# Patient Record
Sex: FEMALE | URBAN - METROPOLITAN AREA
[De-identification: names, ages, dates, MRNs, and addresses within clinical notes are randomized per-mention and may not be internally consistent; named-entity substitution may affect disease eponyms.]

---

## 2018-07-19 ENCOUNTER — INPATIENT (INPATIENT)
Facility: HOSPITAL | Age: 48
LOS: 6 days | Discharge: ANOTHER HOSPITAL | DRG: 690 | End: 2018-07-26
Attending: INTERNAL MEDICINE | Admitting: INTERNAL MEDICINE
Payer: MEDICARE

## 2018-07-19 VITALS
SYSTOLIC BLOOD PRESSURE: 141 MMHG | TEMPERATURE: 98 F | OXYGEN SATURATION: 97 % | RESPIRATION RATE: 14 BRPM | WEIGHT: 220.9 LBS | DIASTOLIC BLOOD PRESSURE: 59 MMHG | HEART RATE: 79 BPM

## 2018-07-19 DIAGNOSIS — Q76.1 KLIPPEL-FEIL SYNDROME: Chronic | ICD-10-CM

## 2018-07-19 LAB
ALBUMIN SERPL ELPH-MCNC: 3.2 G/DL — LOW (ref 3.3–5)
ALP SERPL-CCNC: 108 U/L — SIGNIFICANT CHANGE UP (ref 40–120)
ALT FLD-CCNC: 25 U/L — SIGNIFICANT CHANGE UP (ref 12–78)
ANION GAP SERPL CALC-SCNC: 11 MMOL/L — SIGNIFICANT CHANGE UP (ref 5–17)
ANISOCYTOSIS BLD QL: SLIGHT — SIGNIFICANT CHANGE UP
APPEARANCE UR: ABNORMAL
APTT BLD: 29.9 SEC — SIGNIFICANT CHANGE UP (ref 27.5–37.4)
AST SERPL-CCNC: 19 U/L — SIGNIFICANT CHANGE UP (ref 15–37)
BACTERIA # UR AUTO: ABNORMAL
BASOPHILS # BLD AUTO: 0.06 K/UL — SIGNIFICANT CHANGE UP (ref 0–0.2)
BASOPHILS NFR BLD AUTO: 0.5 % — SIGNIFICANT CHANGE UP (ref 0–2)
BILIRUB SERPL-MCNC: 0.4 MG/DL — SIGNIFICANT CHANGE UP (ref 0.2–1.2)
BILIRUB UR-MCNC: NEGATIVE — SIGNIFICANT CHANGE UP
BUN SERPL-MCNC: 16 MG/DL — SIGNIFICANT CHANGE UP (ref 7–23)
CALCIUM SERPL-MCNC: 8.3 MG/DL — LOW (ref 8.5–10.1)
CHLORIDE SERPL-SCNC: 97 MMOL/L — SIGNIFICANT CHANGE UP (ref 96–108)
CO2 SERPL-SCNC: 23 MMOL/L — SIGNIFICANT CHANGE UP (ref 22–31)
COLOR SPEC: YELLOW — SIGNIFICANT CHANGE UP
CREAT SERPL-MCNC: 1.3 MG/DL — SIGNIFICANT CHANGE UP (ref 0.5–1.3)
DACRYOCYTES BLD QL SMEAR: SLIGHT — SIGNIFICANT CHANGE UP
DIFF PNL FLD: NEGATIVE — SIGNIFICANT CHANGE UP
ELLIPTOCYTES BLD QL SMEAR: SLIGHT — SIGNIFICANT CHANGE UP
EOSINOPHIL # BLD AUTO: 0.55 K/UL — HIGH (ref 0–0.5)
EOSINOPHIL NFR BLD AUTO: 4.8 % — SIGNIFICANT CHANGE UP (ref 0–6)
EPI CELLS # UR: SIGNIFICANT CHANGE UP
GLUCOSE SERPL-MCNC: 404 MG/DL — HIGH (ref 70–99)
GLUCOSE UR QL: 1000 MG/DL
HCG SERPL-ACNC: 1 MIU/ML — SIGNIFICANT CHANGE UP
HCT VFR BLD CALC: 30.1 % — LOW (ref 34.5–45)
HGB BLD-MCNC: 9.7 G/DL — LOW (ref 11.5–15.5)
IMM GRANULOCYTES NFR BLD AUTO: 1 % — SIGNIFICANT CHANGE UP (ref 0–1.5)
INR BLD: 0.97 RATIO — SIGNIFICANT CHANGE UP (ref 0.88–1.16)
KETONES UR-MCNC: NEGATIVE — SIGNIFICANT CHANGE UP
LACTATE SERPL-SCNC: 1.7 MMOL/L — SIGNIFICANT CHANGE UP (ref 0.7–2)
LACTATE SERPL-SCNC: 2.5 MMOL/L — HIGH (ref 0.7–2)
LEUKOCYTE ESTERASE UR-ACNC: NEGATIVE — SIGNIFICANT CHANGE UP
LIDOCAIN IGE QN: 81 U/L — SIGNIFICANT CHANGE UP (ref 73–393)
LYMPHOCYTES # BLD AUTO: 1.88 K/UL — SIGNIFICANT CHANGE UP (ref 1–3.3)
LYMPHOCYTES # BLD AUTO: 16.4 % — SIGNIFICANT CHANGE UP (ref 13–44)
MANUAL SMEAR VERIFICATION: SIGNIFICANT CHANGE UP
MCHC RBC-ENTMCNC: 19.7 PG — LOW (ref 27–34)
MCHC RBC-ENTMCNC: 32.2 GM/DL — SIGNIFICANT CHANGE UP (ref 32–36)
MCV RBC AUTO: 61.2 FL — LOW (ref 80–100)
MICROCYTES BLD QL: SIGNIFICANT CHANGE UP
MONOCYTES # BLD AUTO: 0.64 K/UL — SIGNIFICANT CHANGE UP (ref 0–0.9)
MONOCYTES NFR BLD AUTO: 5.6 % — SIGNIFICANT CHANGE UP (ref 2–14)
NEUTROPHILS # BLD AUTO: 8.22 K/UL — HIGH (ref 1.8–7.4)
NEUTROPHILS NFR BLD AUTO: 71.7 % — SIGNIFICANT CHANGE UP (ref 43–77)
NITRITE UR-MCNC: POSITIVE
NRBC # BLD: 0 /100 WBCS — SIGNIFICANT CHANGE UP (ref 0–0)
OVALOCYTES BLD QL SMEAR: SLIGHT — SIGNIFICANT CHANGE UP
PH UR: 5 — SIGNIFICANT CHANGE UP (ref 5–8)
PLAT MORPH BLD: NORMAL — SIGNIFICANT CHANGE UP
PLATELET # BLD AUTO: 226 K/UL — SIGNIFICANT CHANGE UP (ref 150–400)
POIKILOCYTOSIS BLD QL AUTO: SLIGHT — SIGNIFICANT CHANGE UP
POTASSIUM SERPL-MCNC: 4.2 MMOL/L — SIGNIFICANT CHANGE UP (ref 3.5–5.3)
POTASSIUM SERPL-SCNC: 4.2 MMOL/L — SIGNIFICANT CHANGE UP (ref 3.5–5.3)
PROT SERPL-MCNC: 7.1 G/DL — SIGNIFICANT CHANGE UP (ref 6–8.3)
PROT UR-MCNC: 75 MG/DL
PROTHROM AB SERPL-ACNC: 10.6 SEC — SIGNIFICANT CHANGE UP (ref 9.8–12.7)
RBC # BLD: 4.92 M/UL — SIGNIFICANT CHANGE UP (ref 3.8–5.2)
RBC # FLD: 15.9 % — HIGH (ref 10.3–14.5)
RBC BLD AUTO: ABNORMAL
RBC CASTS # UR COMP ASSIST: SIGNIFICANT CHANGE UP /HPF (ref 0–4)
SODIUM SERPL-SCNC: 131 MMOL/L — LOW (ref 135–145)
SP GR SPEC: 1.01 — SIGNIFICANT CHANGE UP (ref 1.01–1.02)
UROBILINOGEN FLD QL: NEGATIVE — SIGNIFICANT CHANGE UP
WBC # BLD: 11.46 K/UL — HIGH (ref 3.8–10.5)
WBC # FLD AUTO: 11.46 K/UL — HIGH (ref 3.8–10.5)
WBC UR QL: SIGNIFICANT CHANGE UP

## 2018-07-19 PROCEDURE — 74176 CT ABD & PELVIS W/O CONTRAST: CPT | Mod: 26

## 2018-07-19 RX ORDER — SODIUM CHLORIDE 9 MG/ML
1000 INJECTION INTRAMUSCULAR; INTRAVENOUS; SUBCUTANEOUS ONCE
Refills: 0 | Status: COMPLETED | OUTPATIENT
Start: 2018-07-19 | End: 2018-07-19

## 2018-07-19 RX ORDER — ONDANSETRON 8 MG/1
4 TABLET, FILM COATED ORAL ONCE
Refills: 0 | Status: COMPLETED | OUTPATIENT
Start: 2018-07-19 | End: 2018-07-19

## 2018-07-19 RX ORDER — HYDROMORPHONE HYDROCHLORIDE 2 MG/ML
0.5 INJECTION INTRAMUSCULAR; INTRAVENOUS; SUBCUTANEOUS ONCE
Refills: 0 | Status: DISCONTINUED | OUTPATIENT
Start: 2018-07-19 | End: 2018-07-19

## 2018-07-19 RX ORDER — CEFTRIAXONE 500 MG/1
1 INJECTION, POWDER, FOR SOLUTION INTRAMUSCULAR; INTRAVENOUS ONCE
Refills: 0 | Status: COMPLETED | OUTPATIENT
Start: 2018-07-19 | End: 2018-07-19

## 2018-07-19 RX ADMIN — ONDANSETRON 4 MILLIGRAM(S): 8 TABLET, FILM COATED ORAL at 19:52

## 2018-07-19 RX ADMIN — SODIUM CHLORIDE 1000 MILLILITER(S): 9 INJECTION INTRAMUSCULAR; INTRAVENOUS; SUBCUTANEOUS at 19:56

## 2018-07-19 RX ADMIN — SODIUM CHLORIDE 1000 MILLILITER(S): 9 INJECTION INTRAMUSCULAR; INTRAVENOUS; SUBCUTANEOUS at 21:10

## 2018-07-19 RX ADMIN — HYDROMORPHONE HYDROCHLORIDE 0.5 MILLIGRAM(S): 2 INJECTION INTRAMUSCULAR; INTRAVENOUS; SUBCUTANEOUS at 23:44

## 2018-07-19 NOTE — ED ADULT NURSE NOTE - OBJECTIVE STATEMENT
Pt to ED states "I don't feel well". C/o abdominal pain diarrhea x 1 week. Abdomen diffusely tender with guarding.

## 2018-07-20 DIAGNOSIS — N12 TUBULO-INTERSTITIAL NEPHRITIS, NOT SPECIFIED AS ACUTE OR CHRONIC: ICD-10-CM

## 2018-07-20 LAB
CRP SERPL-MCNC: 2.17 MG/DL — HIGH (ref 0–0.4)
CULTURE RESULTS: SIGNIFICANT CHANGE UP
ERYTHROCYTE [SEDIMENTATION RATE] IN BLOOD: 22 MM/HR — HIGH (ref 0–15)
SPECIMEN SOURCE: SIGNIFICANT CHANGE UP

## 2018-07-20 PROCEDURE — 99285 EMERGENCY DEPT VISIT HI MDM: CPT

## 2018-07-20 RX ORDER — LISINOPRIL 2.5 MG/1
2.5 TABLET ORAL DAILY
Refills: 0 | Status: DISCONTINUED | OUTPATIENT
Start: 2018-07-20 | End: 2018-07-26

## 2018-07-20 RX ORDER — DEXTROSE 50 % IN WATER 50 %
25 SYRINGE (ML) INTRAVENOUS ONCE
Refills: 0 | Status: DISCONTINUED | OUTPATIENT
Start: 2018-07-20 | End: 2018-07-26

## 2018-07-20 RX ORDER — INSULIN GLARGINE 100 [IU]/ML
24 INJECTION, SOLUTION SUBCUTANEOUS AT BEDTIME
Refills: 0 | Status: DISCONTINUED | OUTPATIENT
Start: 2018-07-20 | End: 2018-07-22

## 2018-07-20 RX ORDER — SODIUM CHLORIDE 9 MG/ML
1000 INJECTION, SOLUTION INTRAVENOUS
Refills: 0 | Status: DISCONTINUED | OUTPATIENT
Start: 2018-07-20 | End: 2018-07-26

## 2018-07-20 RX ORDER — INSULIN LISPRO 100/ML
10 VIAL (ML) SUBCUTANEOUS
Refills: 0 | Status: DISCONTINUED | OUTPATIENT
Start: 2018-07-20 | End: 2018-07-26

## 2018-07-20 RX ORDER — MIRTAZAPINE 45 MG/1
30 TABLET, ORALLY DISINTEGRATING ORAL ONCE
Refills: 0 | Status: COMPLETED | OUTPATIENT
Start: 2018-07-20 | End: 2018-07-20

## 2018-07-20 RX ORDER — TRAZODONE HCL 50 MG
100 TABLET ORAL AT BEDTIME
Refills: 0 | Status: DISCONTINUED | OUTPATIENT
Start: 2018-07-20 | End: 2018-07-26

## 2018-07-20 RX ORDER — CLONAZEPAM 1 MG
1 TABLET ORAL ONCE
Refills: 0 | Status: DISCONTINUED | OUTPATIENT
Start: 2018-07-20 | End: 2018-07-20

## 2018-07-20 RX ORDER — DEXTROSE 50 % IN WATER 50 %
15 SYRINGE (ML) INTRAVENOUS ONCE
Refills: 0 | Status: DISCONTINUED | OUTPATIENT
Start: 2018-07-20 | End: 2018-07-26

## 2018-07-20 RX ORDER — NICOTINE POLACRILEX 2 MG
1 GUM BUCCAL DAILY
Refills: 0 | Status: DISCONTINUED | OUTPATIENT
Start: 2018-07-20 | End: 2018-07-26

## 2018-07-20 RX ORDER — MIRTAZAPINE 45 MG/1
30 TABLET, ORALLY DISINTEGRATING ORAL DAILY
Refills: 0 | Status: DISCONTINUED | OUTPATIENT
Start: 2018-07-20 | End: 2018-07-26

## 2018-07-20 RX ORDER — ENOXAPARIN SODIUM 100 MG/ML
40 INJECTION SUBCUTANEOUS DAILY
Refills: 0 | Status: DISCONTINUED | OUTPATIENT
Start: 2018-07-20 | End: 2018-07-26

## 2018-07-20 RX ORDER — KETOROLAC TROMETHAMINE 30 MG/ML
15 SYRINGE (ML) INJECTION ONCE
Refills: 0 | Status: DISCONTINUED | OUTPATIENT
Start: 2018-07-20 | End: 2018-07-20

## 2018-07-20 RX ORDER — GABAPENTIN 400 MG/1
800 CAPSULE ORAL ONCE
Refills: 0 | Status: COMPLETED | OUTPATIENT
Start: 2018-07-20 | End: 2018-07-20

## 2018-07-20 RX ORDER — FUROSEMIDE 40 MG
20 TABLET ORAL
Refills: 0 | Status: DISCONTINUED | OUTPATIENT
Start: 2018-07-20 | End: 2018-07-26

## 2018-07-20 RX ORDER — CITALOPRAM 10 MG/1
20 TABLET, FILM COATED ORAL DAILY
Refills: 0 | Status: DISCONTINUED | OUTPATIENT
Start: 2018-07-20 | End: 2018-07-26

## 2018-07-20 RX ORDER — ASPIRIN/CALCIUM CARB/MAGNESIUM 324 MG
81 TABLET ORAL DAILY
Refills: 0 | Status: DISCONTINUED | OUTPATIENT
Start: 2018-07-20 | End: 2018-07-26

## 2018-07-20 RX ORDER — TRAZODONE HCL 50 MG
200 TABLET ORAL ONCE
Refills: 0 | Status: COMPLETED | OUTPATIENT
Start: 2018-07-20 | End: 2018-07-20

## 2018-07-20 RX ORDER — ATORVASTATIN CALCIUM 80 MG/1
10 TABLET, FILM COATED ORAL ONCE
Refills: 0 | Status: COMPLETED | OUTPATIENT
Start: 2018-07-20 | End: 2018-07-20

## 2018-07-20 RX ORDER — OXYCODONE HYDROCHLORIDE 5 MG/1
10 TABLET ORAL
Refills: 0 | Status: DISCONTINUED | OUTPATIENT
Start: 2018-07-20 | End: 2018-07-26

## 2018-07-20 RX ORDER — DEXTROSE 50 % IN WATER 50 %
12.5 SYRINGE (ML) INTRAVENOUS ONCE
Refills: 0 | Status: DISCONTINUED | OUTPATIENT
Start: 2018-07-20 | End: 2018-07-26

## 2018-07-20 RX ORDER — CITALOPRAM 10 MG/1
40 TABLET, FILM COATED ORAL ONCE
Refills: 0 | Status: COMPLETED | OUTPATIENT
Start: 2018-07-20 | End: 2018-07-20

## 2018-07-20 RX ORDER — GLUCAGON INJECTION, SOLUTION 0.5 MG/.1ML
1 INJECTION, SOLUTION SUBCUTANEOUS ONCE
Refills: 0 | Status: DISCONTINUED | OUTPATIENT
Start: 2018-07-20 | End: 2018-07-26

## 2018-07-20 RX ORDER — LEVOTHYROXINE SODIUM 125 MCG
50 TABLET ORAL DAILY
Refills: 0 | Status: DISCONTINUED | OUTPATIENT
Start: 2018-07-20 | End: 2018-07-26

## 2018-07-20 RX ORDER — INSULIN LISPRO 100/ML
VIAL (ML) SUBCUTANEOUS
Refills: 0 | Status: DISCONTINUED | OUTPATIENT
Start: 2018-07-20 | End: 2018-07-26

## 2018-07-20 RX ORDER — FAMOTIDINE 10 MG/ML
20 INJECTION INTRAVENOUS
Refills: 0 | Status: DISCONTINUED | OUTPATIENT
Start: 2018-07-20 | End: 2018-07-26

## 2018-07-20 RX ORDER — HYDROMORPHONE HYDROCHLORIDE 2 MG/ML
0.5 INJECTION INTRAMUSCULAR; INTRAVENOUS; SUBCUTANEOUS ONCE
Refills: 0 | Status: DISCONTINUED | OUTPATIENT
Start: 2018-07-20 | End: 2018-07-20

## 2018-07-20 RX ORDER — LACTOBACILLUS ACIDOPHILUS 100MM CELL
1 CAPSULE ORAL
Refills: 0 | Status: DISCONTINUED | OUTPATIENT
Start: 2018-07-20 | End: 2018-07-26

## 2018-07-20 RX ORDER — CEFTRIAXONE 500 MG/1
1 INJECTION, POWDER, FOR SOLUTION INTRAMUSCULAR; INTRAVENOUS EVERY 24 HOURS
Refills: 0 | Status: DISCONTINUED | OUTPATIENT
Start: 2018-07-20 | End: 2018-07-25

## 2018-07-20 RX ORDER — ATORVASTATIN CALCIUM 80 MG/1
20 TABLET, FILM COATED ORAL AT BEDTIME
Refills: 0 | Status: DISCONTINUED | OUTPATIENT
Start: 2018-07-20 | End: 2018-07-26

## 2018-07-20 RX ORDER — CLONAZEPAM 1 MG
0.5 TABLET ORAL THREE TIMES A DAY
Refills: 0 | Status: DISCONTINUED | OUTPATIENT
Start: 2018-07-20 | End: 2018-07-26

## 2018-07-20 RX ORDER — INSULIN LISPRO 100/ML
VIAL (ML) SUBCUTANEOUS
Refills: 0 | Status: DISCONTINUED | OUTPATIENT
Start: 2018-07-20 | End: 2018-07-20

## 2018-07-20 RX ORDER — MONTELUKAST 4 MG/1
10 TABLET, CHEWABLE ORAL ONCE
Refills: 0 | Status: COMPLETED | OUTPATIENT
Start: 2018-07-20 | End: 2018-07-20

## 2018-07-20 RX ORDER — GABAPENTIN 400 MG/1
600 CAPSULE ORAL
Refills: 0 | Status: DISCONTINUED | OUTPATIENT
Start: 2018-07-20 | End: 2018-07-26

## 2018-07-20 RX ADMIN — ATORVASTATIN CALCIUM 10 MILLIGRAM(S): 80 TABLET, FILM COATED ORAL at 01:49

## 2018-07-20 RX ADMIN — CEFTRIAXONE 100 GRAM(S): 500 INJECTION, POWDER, FOR SOLUTION INTRAMUSCULAR; INTRAVENOUS at 01:13

## 2018-07-20 RX ADMIN — Medication 81 MILLIGRAM(S): at 12:15

## 2018-07-20 RX ADMIN — Medication 4: at 17:21

## 2018-07-20 RX ADMIN — HYDROMORPHONE HYDROCHLORIDE 0.5 MILLIGRAM(S): 2 INJECTION INTRAMUSCULAR; INTRAVENOUS; SUBCUTANEOUS at 01:49

## 2018-07-20 RX ADMIN — MIRTAZAPINE 30 MILLIGRAM(S): 45 TABLET, ORALLY DISINTEGRATING ORAL at 01:49

## 2018-07-20 RX ADMIN — CITALOPRAM 40 MILLIGRAM(S): 10 TABLET, FILM COATED ORAL at 01:49

## 2018-07-20 RX ADMIN — Medication 15 MILLIGRAM(S): at 01:50

## 2018-07-20 RX ADMIN — Medication 0.5 MILLIGRAM(S): at 13:41

## 2018-07-20 RX ADMIN — Medication 12: at 12:11

## 2018-07-20 RX ADMIN — Medication 10 UNIT(S): at 12:11

## 2018-07-20 RX ADMIN — Medication 20 MILLIGRAM(S): at 17:22

## 2018-07-20 RX ADMIN — OXYCODONE HYDROCHLORIDE 10 MILLIGRAM(S): 5 TABLET ORAL at 12:13

## 2018-07-20 RX ADMIN — LISINOPRIL 2.5 MILLIGRAM(S): 2.5 TABLET ORAL at 12:14

## 2018-07-20 RX ADMIN — ENOXAPARIN SODIUM 40 MILLIGRAM(S): 100 INJECTION SUBCUTANEOUS at 12:15

## 2018-07-20 RX ADMIN — GABAPENTIN 800 MILLIGRAM(S): 400 CAPSULE ORAL at 01:49

## 2018-07-20 RX ADMIN — OXYCODONE HYDROCHLORIDE 10 MILLIGRAM(S): 5 TABLET ORAL at 11:13

## 2018-07-20 RX ADMIN — Medication 50 MICROGRAM(S): at 12:14

## 2018-07-20 RX ADMIN — Medication 10 UNIT(S): at 17:21

## 2018-07-20 RX ADMIN — Medication 10 UNIT(S): at 08:41

## 2018-07-20 RX ADMIN — GABAPENTIN 600 MILLIGRAM(S): 400 CAPSULE ORAL at 17:22

## 2018-07-20 RX ADMIN — Medication 200 MILLIGRAM(S): at 01:49

## 2018-07-20 RX ADMIN — Medication 1 MILLIGRAM(S): at 01:49

## 2018-07-20 RX ADMIN — Medication 1 TABLET(S): at 17:22

## 2018-07-20 RX ADMIN — CEFTRIAXONE 100 GRAM(S): 500 INJECTION, POWDER, FOR SOLUTION INTRAMUSCULAR; INTRAVENOUS at 13:37

## 2018-07-20 RX ADMIN — HYDROMORPHONE HYDROCHLORIDE 0.5 MILLIGRAM(S): 2 INJECTION INTRAMUSCULAR; INTRAVENOUS; SUBCUTANEOUS at 01:13

## 2018-07-20 RX ADMIN — Medication 15 MILLIGRAM(S): at 01:14

## 2018-07-20 RX ADMIN — MONTELUKAST 10 MILLIGRAM(S): 4 TABLET, CHEWABLE ORAL at 01:49

## 2018-07-20 RX ADMIN — CITALOPRAM 20 MILLIGRAM(S): 10 TABLET, FILM COATED ORAL at 12:15

## 2018-07-20 NOTE — PATIENT PROFILE ADULT. - NS TRANSFER PATIENT BELONGINGS
Ipad, brace for back not currently used by pt, gold necklace and 1 pair of earings/Cell Phone/PDA (specify)/Electronic Device (specify)/Jewelry/Money (specify)/Clothing

## 2018-07-20 NOTE — ED PROVIDER NOTE - OBJECTIVE STATEMENT
47 female sent to ER by PMD DR. Deleon for sepsis evaluation. Patient states she has been having lower abdominal pains radiating to her back for the past week, associated with low grade fever, diarrhea, generalized weakness.

## 2018-07-20 NOTE — PROVIDER CONTACT NOTE (OTHER) - ACTION/TREATMENT ORDERED:
Dr. Deleon notified, insulin administered per sliding scale and standing order.  Diabetic nurse practitioner made aware. Diabetic education rendered to patient, will continue to monitor.

## 2018-07-20 NOTE — DIETITIAN INITIAL EVALUATION ADULT. - NS AS NUTRI INTERV ED CONTENT
patient appear not interested in education at this time only interested in receiving food choices, will follow and educate as appropriate

## 2018-07-20 NOTE — PATIENT PROFILE ADULT. - VISION (WITH CORRECTIVE LENSES IF THE PATIENT USUALLY WEARS THEM):
uses eye glasses but not with her/Partially impaired: cannot see medication labels or newsprint, but can see obstacles in path, and the surrounding layout; can count fingers at arm's length

## 2018-07-20 NOTE — ED PROVIDER NOTE - PROGRESS NOTE DETAILS
unable to get a hold of PMD Dr. Deleon, spoke with covering physcian Dr. Da Silva, case discussed, states to admit to Dr. Deleon service and will let him know to see the patient in the morning for admission unable to get a hold of PMD Dr. Deleon, spoke with covering physcian Dr. Da Silva, case discussed, states to admit to Dr. Deleon service and will let him know to see the patient in the morning for admission orders, patient to stay in the ER

## 2018-07-20 NOTE — DIETITIAN INITIAL EVALUATION ADULT. - NS AS NUTRI INTERV MEALS SNACK
suggest add consistent cho to current diet/Carbohydrate - modified diet/Fat - modified diet/Mineral - modified diet

## 2018-07-20 NOTE — H&P ADULT - HISTORY OF PRESENT ILLNESS
this is a 45 yo female , single no kids , with hx of chronic pain syndrome on multiple pain meds by pain management , , and neuropathy and diabetes and poor control and poor compliance diabetes and severe s recurrent major depression and anxiety disorder who follows with psych  and hx of multiple c spine , ls spine and abdominal surgeries and carpal tunnel syndrome and gyn surgeries , and recent mrsa post abdominal gyn surgery requiring home iv abx for 6-8 weeks ,   patient presented to office yesterday and complain of abdominal pain , nausea , weakness , lethargy and poor po intake , this is a 45 yo female , single no kids , with hx of chronic pain syndrome on multiple pain meds by pain management , , and neuropathy and diabetes and poor control and poor compliance diabetes and severe s recurrent major depression and anxiety disorder who follows with psych  and hx of multiple c spine , ls spine and abdominal surgeries and carpal tunnel syndrome and gyn surgeries for hysterectomy  , and recent mrsa post abdominal gyn surgery requiring home iv abx for 6-8 weeks , hx of laminectomy and hardware in ls spine and c spine , obesity , poor compliance with diabetes , hx of psych admission , p  patient presented to office yesterday and complain of abdominal pain , nausea , weakness , lethargy and poor po intake ,   in er anemia , lactae high , ua positive , sugar high and ct abdomen with ? pyelonephritis ,   admit control blood sugar and dm , educate , IV abx for ? kidney infection , probiotics , pain control ,

## 2018-07-20 NOTE — DIETITIAN INITIAL EVALUATION ADULT. - OTHER INFO
patient reports many food preferences allergies to fresh fruits noted. requesting cookies and cakes. states has been eating more protein foods but states eats many bowls of oatmeal at breakfast at home. wants HS snack. patient visited again to obtain ht per RN and RD patient hardly able to stay up  will remain available for education as appropriate prior to discharge.

## 2018-07-20 NOTE — H&P ADULT - ASSESSMENT
this is a 45 yo female , single no kids , with hx of chronic pain syndrome on multiple pain meds by pain management , , and neuropathy and diabetes and poor control and poor compliance diabetes and severe s recurrent major depression and anxiety disorder who follows with psych  and hx of multiple c spine , ls spine and abdominal surgeries and carpal tunnel syndrome and gyn surgeries , and recent mrsa post abdominal gyn surgery requiring home iv abx for 6-8 weeks ,   patient presented to office yesterday and complain of abdominal pain , nausea , weakness , lethargy and poor po intake , this is a 47 yo female , single no kids , with hx of chronic pain syndrome on multiple pain meds by pain management , , and neuropathy and diabetes and poor control and poor compliance diabetes and severe s recurrent major depression and anxiety disorder who follows with psych  and hx of multiple c spine , ls spine and abdominal surgeries and carpal tunnel syndrome and gyn surgeries for hysterectomy  , and recent mrsa post abdominal gyn surgery requiring home iv abx for 6-8 weeks , hx of laminectomy and hardware in ls spine and c spine , obesity , poor compliance with diabetes , hx of psych admission , p  patient presented to office yesterday and complain of abdominal pain , nausea , weakness , lethargy and poor po intake ,   in er anemia , lactae high , ua positive , sugar high and ct abdomen with ? pyelonephritis ,   admit control blood sugar and dm , educate , IV abx for ? kidney infection , probiotics , pain control ,

## 2018-07-20 NOTE — H&P ADULT - NEUROLOGICAL DETAILS
alert and oriented x 3/responds to pain/responds to verbal commands/sensation intact/cranial nerves intact/strength decreased

## 2018-07-20 NOTE — H&P ADULT - MS GEN HX ROS MEA POS PC
arthralgia/arthritis/joint swelling/joint pain/myalgia/muscle cramps/muscle weakness/stiffness/neck pain/back pain

## 2018-07-20 NOTE — DIETITIAN INITIAL EVALUATION ADULT. - DIET TYPE
awaiting MD sign off on consistent cho dash tlc diet/DASH/TLC (sodium and cholesterol restricted diet)

## 2018-07-20 NOTE — H&P ADULT - ENMT
The patient is a 66y year old Female complaining of foot pain/injury. No oral lesions; no gross abnormalities negative

## 2018-07-21 ENCOUNTER — TRANSCRIPTION ENCOUNTER (OUTPATIENT)
Age: 48
End: 2018-07-21

## 2018-07-21 RX ORDER — FAMOTIDINE 10 MG/ML
1 INJECTION INTRAVENOUS
Qty: 0 | Refills: 0 | DISCHARGE
Start: 2018-07-21

## 2018-07-21 RX ORDER — INSULIN GLARGINE 100 [IU]/ML
0 INJECTION, SOLUTION SUBCUTANEOUS
Qty: 0 | Refills: 0 | DISCHARGE
Start: 2018-07-21

## 2018-07-21 RX ORDER — INSULIN LISPRO 100/ML
0 VIAL (ML) SUBCUTANEOUS
Qty: 0 | Refills: 0 | DISCHARGE
Start: 2018-07-21

## 2018-07-21 RX ORDER — ASPIRIN/CALCIUM CARB/MAGNESIUM 324 MG
1 TABLET ORAL
Qty: 0 | Refills: 0 | DISCHARGE
Start: 2018-07-21

## 2018-07-21 RX ORDER — NICOTINE POLACRILEX 2 MG
0 GUM BUCCAL
Qty: 0 | Refills: 0 | DISCHARGE
Start: 2018-07-21

## 2018-07-21 RX ORDER — INSULIN GLARGINE 100 [IU]/ML
30 INJECTION, SOLUTION SUBCUTANEOUS
Qty: 0 | Refills: 0 | DISCHARGE
Start: 2018-07-21

## 2018-07-21 RX ORDER — LACTOBACILLUS ACIDOPHILUS 100MM CELL
0 CAPSULE ORAL
Qty: 0 | Refills: 0 | DISCHARGE
Start: 2018-07-21

## 2018-07-21 RX ORDER — LOPERAMIDE HCL 2 MG
2 TABLET ORAL ONCE
Refills: 0 | Status: COMPLETED | OUTPATIENT
Start: 2018-07-21 | End: 2018-07-22

## 2018-07-21 RX ADMIN — Medication 0.5 MILLIGRAM(S): at 22:28

## 2018-07-21 RX ADMIN — OXYCODONE HYDROCHLORIDE 10 MILLIGRAM(S): 5 TABLET ORAL at 20:10

## 2018-07-21 RX ADMIN — Medication 4: at 17:36

## 2018-07-21 RX ADMIN — Medication 50 MICROGRAM(S): at 06:42

## 2018-07-21 RX ADMIN — CEFTRIAXONE 100 GRAM(S): 500 INJECTION, POWDER, FOR SOLUTION INTRAMUSCULAR; INTRAVENOUS at 14:06

## 2018-07-21 RX ADMIN — Medication 100 MILLIGRAM(S): at 22:28

## 2018-07-21 RX ADMIN — Medication 1 TABLET(S): at 17:40

## 2018-07-21 RX ADMIN — OXYCODONE HYDROCHLORIDE 10 MILLIGRAM(S): 5 TABLET ORAL at 20:45

## 2018-07-21 RX ADMIN — ATORVASTATIN CALCIUM 20 MILLIGRAM(S): 80 TABLET, FILM COATED ORAL at 00:20

## 2018-07-21 RX ADMIN — Medication 81 MILLIGRAM(S): at 12:06

## 2018-07-21 RX ADMIN — MIRTAZAPINE 30 MILLIGRAM(S): 45 TABLET, ORALLY DISINTEGRATING ORAL at 00:20

## 2018-07-21 RX ADMIN — Medication 10: at 12:04

## 2018-07-21 RX ADMIN — LISINOPRIL 2.5 MILLIGRAM(S): 2.5 TABLET ORAL at 06:41

## 2018-07-21 RX ADMIN — INSULIN GLARGINE 24 UNIT(S): 100 INJECTION, SOLUTION SUBCUTANEOUS at 00:21

## 2018-07-21 RX ADMIN — Medication 10: at 08:14

## 2018-07-21 RX ADMIN — OXYCODONE HYDROCHLORIDE 10 MILLIGRAM(S): 5 TABLET ORAL at 10:09

## 2018-07-21 RX ADMIN — Medication 1 TABLET(S): at 08:15

## 2018-07-21 RX ADMIN — Medication 10 UNIT(S): at 12:04

## 2018-07-21 RX ADMIN — Medication 1 PATCH: at 12:06

## 2018-07-21 RX ADMIN — Medication 0.5 MILLIGRAM(S): at 14:06

## 2018-07-21 RX ADMIN — GABAPENTIN 600 MILLIGRAM(S): 400 CAPSULE ORAL at 06:41

## 2018-07-21 RX ADMIN — ATORVASTATIN CALCIUM 20 MILLIGRAM(S): 80 TABLET, FILM COATED ORAL at 22:28

## 2018-07-21 RX ADMIN — Medication 20 MILLIGRAM(S): at 17:39

## 2018-07-21 RX ADMIN — Medication 20 MILLIGRAM(S): at 06:41

## 2018-07-21 RX ADMIN — FAMOTIDINE 20 MILLIGRAM(S): 10 INJECTION INTRAVENOUS at 17:39

## 2018-07-21 RX ADMIN — MIRTAZAPINE 30 MILLIGRAM(S): 45 TABLET, ORALLY DISINTEGRATING ORAL at 22:28

## 2018-07-21 RX ADMIN — INSULIN GLARGINE 24 UNIT(S): 100 INJECTION, SOLUTION SUBCUTANEOUS at 22:29

## 2018-07-21 RX ADMIN — Medication 100 MILLIGRAM(S): at 00:20

## 2018-07-21 RX ADMIN — FAMOTIDINE 20 MILLIGRAM(S): 10 INJECTION INTRAVENOUS at 06:41

## 2018-07-21 RX ADMIN — GABAPENTIN 600 MILLIGRAM(S): 400 CAPSULE ORAL at 17:39

## 2018-07-21 RX ADMIN — CITALOPRAM 20 MILLIGRAM(S): 10 TABLET, FILM COATED ORAL at 22:28

## 2018-07-21 RX ADMIN — Medication 10 UNIT(S): at 08:14

## 2018-07-21 RX ADMIN — Medication 10 UNIT(S): at 17:36

## 2018-07-21 RX ADMIN — ENOXAPARIN SODIUM 40 MILLIGRAM(S): 100 INJECTION SUBCUTANEOUS at 12:06

## 2018-07-21 RX ADMIN — OXYCODONE HYDROCHLORIDE 10 MILLIGRAM(S): 5 TABLET ORAL at 11:09

## 2018-07-21 NOTE — PHYSICAL THERAPY INITIAL EVALUATION ADULT - ADDITIONAL COMMENTS
Pt has 4 steps to enter building. Pt then has a flight of stairs to get to 2nd floor apartment. Pt owns a wheeled walker and a cane

## 2018-07-21 NOTE — DISCHARGE NOTE ADULT - MEDICATION SUMMARY - MEDICATIONS TO TAKE
I will START or STAY ON the medications listed below when I get home from the hospital:    ibuprofen 800 mg oral tablet  -- 1 tab(s) by mouth every 8 hours, As Needed  -- Do not take this drug if you are pregnant.  It is very important that you take or use this exactly as directed.  Do not skip doses or discontinue unless directed by your doctor.  May cause drowsiness or dizziness.  Obtain medical advice before taking any non-prescription drugs as some may affect the action of this medication.  Take with food or milk.      -- Indication: For Pain    oxyCODONE 15 mg oral tablet, extended release  -- 1 tab(s) by mouth 3 times a day (with meals)  -- Indication: For Pain    aspirin 81 mg oral delayed release tablet  -- 1 tab(s) by mouth once a day  -- Indication: For cad    lisinopril 2.5 mg oral tablet  -- 1 tab(s) by mouth once a day  -- Indication: For Htn    KlonoPIN  -- Indication: For anxiety    gabapentin 600 mg oral tablet  -- 1 tab(s) by mouth 2 times a day  -- Indication: For neuropathy    mirtazapine 45 mg oral tablet, disintegrating  -- Indication: For depression    citalopram  -- Indication: For depression    traZODone 100 mg oral tablet  -- 200 milligram(s) by mouth once a day (at bedtime)  -- Indication: For depression    insulin  -- Indication: For dm    insulin glargine  -- Indication: For dm    insulin lispro (concentrated) 200 units/mL subcutaneous solution  --  subcutaneous   -- Indication: For dm    Lipitor  -- Indication: For Hld    Ventolin  -- Indication: For asthma    furosemide 20 mg oral tablet  -- 1 tab(s) by mouth 2 times a day  -- Indication: For edema    famotidine 20 mg oral tablet  -- 1 tab(s) by mouth 2 times a day  -- Indication: For gerd    Singulair 10 mg oral tablet  -- Indication: For allergies    lactobacillus acidophilus oral capsule  --  by mouth   -- Indication: For Probiotics    nicotine 14 mg/24 hr transdermal film, extended release  --  by transdermal patch   -- Indication: For SMOKING    Synthroid 50 mcg (0.05 mg) oral tablet  -- 1 tab(s) by mouth once a day  -- Indication: For HYPOTHYROID I will START or STAY ON the medications listed below when I get home from the hospital:    ibuprofen 800 mg oral tablet  -- 1 tab(s) by mouth every 8 hours, As Needed  -- Do not take this drug if you are pregnant.  It is very important that you take or use this exactly as directed.  Do not skip doses or discontinue unless directed by your doctor.  May cause drowsiness or dizziness.  Obtain medical advice before taking any non-prescription drugs as some may affect the action of this medication.  Take with food or milk.      -- Indication: For Pain    oxyCODONE 15 mg oral tablet, extended release  -- 1 tab(s) by mouth 3 times a day (with meals)  -- Indication: For Pain    aspirin 81 mg oral delayed release tablet  -- 1 tab(s) by mouth once a day  -- Indication: For cad    lisinopril 2.5 mg oral tablet  -- 1 tab(s) by mouth once a day  -- Indication: For Htn    KlonoPIN  -- Indication: For anxiety    gabapentin 600 mg oral tablet  -- 1 tab(s) by mouth 2 times a day  -- Indication: For neuropathy    mirtazapine 45 mg oral tablet, disintegrating  -- Indication: For depression    citalopram  -- Indication: For depression    traZODone 100 mg oral tablet  -- 200 milligram(s) by mouth once a day (at bedtime)  -- Indication: For depression    insulin  -- Indication: For dm    insulin lispro (concentrated) 200 units/mL subcutaneous solution  --  subcutaneous   -- Indication: For dm    insulin glargine  -- 30 unit(s) subcutaneous 2 times a day  -- Indication: For Type 2 diabetes mellitus with hyperglycemia, without long-term current use of insulin    Lipitor  -- Indication: For High cholesterol    Ventolin  -- Indication: For asthma    furosemide 20 mg oral tablet  -- 1 tab(s) by mouth 2 times a day  -- Indication: For edema    famotidine 20 mg oral tablet  -- 1 tab(s) by mouth 2 times a day  -- Indication: For gerd    Singulair 10 mg oral tablet  -- Indication: For allergies    lactobacillus acidophilus oral capsule  --  by mouth   -- Indication: For Probiotics    nicotine 14 mg/24 hr transdermal film, extended release  --  by transdermal patch   -- Indication: For SMOKING    Synthroid 50 mcg (0.05 mg) oral tablet  -- 1 tab(s) by mouth once a day  -- Indication: For HYPOTHYROID

## 2018-07-21 NOTE — DISCHARGE NOTE ADULT - CARE PROVIDER_API CALL
George Deleon), Internal Medicine  [address] [place] [address]  Phone: [phone]  Fax: [phone]

## 2018-07-21 NOTE — DISCHARGE NOTE ADULT - OTHER SIGNIFICANT FINDINGS
please note , patient wears a Bone Growth Stimulator on her back as a belt about 1 -2 hrs a day , ,  this is for chronic back pain ,   she walks with a walker ,   wears a belt support for back pain

## 2018-07-21 NOTE — DISCHARGE NOTE ADULT - SECONDARY DIAGNOSIS.
Neuropathic arthritis due to secondary diabetes Cervical fusion syndrome High cholesterol Moderate episode of recurrent major depressive disorder Post traumatic stress disorder Type 2 diabetes mellitus with hyperglycemia, without long-term current use of insulin

## 2018-07-21 NOTE — DISCHARGE NOTE ADULT - HOSPITAL COURSE
Assessment and Plan:   · Assessment		  this is a 47 yo female , single no kids , with hx of chronic pain syndrome on multiple pain meds by pain management , , and neuropathy and diabetes and poor control and poor compliance diabetes and severe s recurrent major depression and anxiety disorder who follows with psych  and hx of multiple c spine , ls spine and abdominal surgeries and carpal tunnel syndrome and gyn surgeries for hysterectomy  , and recent mrsa post abdominal gyn surgery requiring home iv abx for 6-8 weeks , hx of laminectomy and hardware in ls spine and c spine , obesity , poor compliance with diabetes , hx of psych admission , p  patient presented to office yesterday and complain of abdominal pain , nausea , weakness , lethargy and poor po intake ,   in er anemia , lactae high , ua positive , sugar high and ct abdomen with ? pyelonephritis ,   admit control blood sugar and dm , educate , IV abx for ? kidney infection , probiotics , pain control , Assessment and Plan:   · Assessment		  this is a 45 yo female , single no kids , with hx of chronic pain syndrome on multiple pain meds by pain management , , and neuropathy and diabetes and poor control and poor compliance diabetes and severe s recurrent major depression and anxiety disorder who follows with psych  and hx of multiple c spine , ls spine and abdominal surgeries and carpal tunnel syndrome and gyn surgeries for hysterectomy  , and recent mrsa post abdominal gyn surgery requiring home iv abx for 6-8 weeks , hx of laminectomy and hardware in ls spine and c spine , obesity , poor compliance with diabetes , hx of psych admission , p  patient presented to office yesterday and complain of abdominal pain , nausea , weakness , lethargy and poor po intake ,   in er anemia , lactae high , ua positive , sugar high and ct abdomen with ? pyelonephritis ,   admit control blood sugar and dm , educate , IV abx for ? kidney infection , probiotics , pain control ,   patient had a repeat ct abdoman , and nono signs of pyelonephritis , and will dc rocephine , as per psych will dc to psych Assessment and Plan:   · Assessment		  this is a 45 yo female , single no kids , with hx of chronic pain syndrome on multiple pain meds by pain management , , and neuropathy and diabetes and poor control and poor compliance diabetes and severe s recurrent major depression and anxiety disorder who follows with psych  and hx of multiple c spine , ls spine and abdominal surgeries and carpal tunnel syndrome and gyn surgeries for hysterectomy  , and recent mrsa post abdominal gyn surgery requiring home iv abx for 6-8 weeks , hx of laminectomy and hardware in ls spine and c spine , obesity , poor compliance with diabetes , hx of psych admission , p  patient presented to office yesterday and complain of abdominal pain , nausea , weakness , lethargy and poor po intake ,   in er anemia , lactae high , ua positive , sugar high and ct abdomen with ? pyelonephritis ,   admit control blood sugar and dm , educate , IV abx for ? kidney infection , probiotics , pain control ,   patient had a repeat ct abdomen , and no signs of pyelonephritis , and will dc Rocephin , as per psych will dc to psych ,  case reviewed with staff and psychiatry , plan psych placement

## 2018-07-21 NOTE — DISCHARGE NOTE ADULT - ADDITIONAL INSTRUCTIONS
1 WEEK WITH PCP AND PSYCH 1 WEEK WITH PCP AND PSYCH  a1c 11% patient states will make follow up appt with Dr. Deleon as he manages her diabetes.

## 2018-07-21 NOTE — PHYSICAL THERAPY INITIAL EVALUATION ADULT - PERTINENT HX OF CURRENT PROBLEM, REHAB EVAL
Pt admitted with nausea, weakness and abdominal pain on 7/20. PMH includes poorly controlled DM,neuropathy, depression, chronic pain syndrome , back pain,, h/o of multiple back, neck and abdominal surgeries

## 2018-07-21 NOTE — DISCHARGE NOTE ADULT - PATIENT PORTAL LINK FT
You can access the [facility] Patient Portal, offered by [facility], by registering with the following website: [email or web address]

## 2018-07-21 NOTE — DISCHARGE NOTE ADULT - CARE PLAN
Principal Discharge DX:	Pyelonephritis  Goal:	? IF REAL , WILL DO 3 DAYS OF IV ABX  Assessment and plan of treatment:	HYDRATE  Secondary Diagnosis:	Moderate episode of recurrent major depressive disorder  Goal:	ON 3 MEDS , FOLLOW UP PSYCH  Secondary Diagnosis:	High cholesterol  Goal:	LIPITOR  Secondary Diagnosis:	Post traumatic stress disorder  Goal:	TRAZADONE  Secondary Diagnosis:	Type 2 diabetes mellitus with hyperglycemia, without long-term current use of insulin  Goal:	INSULIN  Secondary Diagnosis:	Cervical fusion syndrome  Goal:	PAIN MEDS, FOLLOW UP ORTHO  Secondary Diagnosis:	Neuropathic arthritis due to secondary diabetes  Goal:	BETTER COMPLIANCE WITH MEDS AND GABAPENTIN

## 2018-07-21 NOTE — DISCHARGE NOTE ADULT - PLAN OF CARE
INSULIN LIPITOR ON 3 MEDS , FOLLOW UP PSYCH ? IF REAL , WILL DO 3 DAYS OF IV ABX PAIN MEDS, FOLLOW UP ORTHO TRAZADONE BETTER COMPLIANCE WITH MEDS AND GABAPENTIN HYDRATE

## 2018-07-22 LAB
HBA1C BLD-MCNC: 11 % — HIGH (ref 4–5.6)
RHEUMATOID FACT SERPL-ACNC: <10 IU/ML — SIGNIFICANT CHANGE UP (ref 0–13)

## 2018-07-22 RX ORDER — INSULIN GLARGINE 100 [IU]/ML
30 INJECTION, SOLUTION SUBCUTANEOUS AT BEDTIME
Refills: 0 | Status: DISCONTINUED | OUTPATIENT
Start: 2018-07-22 | End: 2018-07-25

## 2018-07-22 RX ORDER — ONDANSETRON 8 MG/1
4 TABLET, FILM COATED ORAL EVERY 6 HOURS
Refills: 0 | Status: DISCONTINUED | OUTPATIENT
Start: 2018-07-22 | End: 2018-07-26

## 2018-07-22 RX ORDER — BUDESONIDE, MICRONIZED 100 %
0.5 POWDER (GRAM) MISCELLANEOUS
Refills: 0 | Status: DISCONTINUED | OUTPATIENT
Start: 2018-07-22 | End: 2018-07-26

## 2018-07-22 RX ORDER — ACETAMINOPHEN 500 MG
650 TABLET ORAL EVERY 6 HOURS
Refills: 0 | Status: DISCONTINUED | OUTPATIENT
Start: 2018-07-22 | End: 2018-07-26

## 2018-07-22 RX ORDER — INSULIN GLARGINE 100 [IU]/ML
15 INJECTION, SOLUTION SUBCUTANEOUS EVERY MORNING
Refills: 0 | Status: DISCONTINUED | OUTPATIENT
Start: 2018-07-22 | End: 2018-07-25

## 2018-07-22 RX ORDER — IPRATROPIUM/ALBUTEROL SULFATE 18-103MCG
3 AEROSOL WITH ADAPTER (GRAM) INHALATION THREE TIMES A DAY
Refills: 0 | Status: DISCONTINUED | OUTPATIENT
Start: 2018-07-22 | End: 2018-07-26

## 2018-07-22 RX ADMIN — CEFTRIAXONE 100 GRAM(S): 500 INJECTION, POWDER, FOR SOLUTION INTRAMUSCULAR; INTRAVENOUS at 14:07

## 2018-07-22 RX ADMIN — INSULIN GLARGINE 30 UNIT(S): 100 INJECTION, SOLUTION SUBCUTANEOUS at 22:26

## 2018-07-22 RX ADMIN — Medication 3 MILLILITER(S): at 13:35

## 2018-07-22 RX ADMIN — Medication 3 MILLILITER(S): at 20:10

## 2018-07-22 RX ADMIN — FAMOTIDINE 20 MILLIGRAM(S): 10 INJECTION INTRAVENOUS at 17:59

## 2018-07-22 RX ADMIN — Medication 1 TABLET(S): at 08:16

## 2018-07-22 RX ADMIN — CITALOPRAM 20 MILLIGRAM(S): 10 TABLET, FILM COATED ORAL at 22:22

## 2018-07-22 RX ADMIN — Medication 10 UNIT(S): at 12:38

## 2018-07-22 RX ADMIN — Medication 10: at 08:30

## 2018-07-22 RX ADMIN — Medication 8: at 12:37

## 2018-07-22 RX ADMIN — OXYCODONE HYDROCHLORIDE 10 MILLIGRAM(S): 5 TABLET ORAL at 08:40

## 2018-07-22 RX ADMIN — Medication 0.5 MILLIGRAM(S): at 14:19

## 2018-07-22 RX ADMIN — Medication 0.5 MILLIGRAM(S): at 20:09

## 2018-07-22 RX ADMIN — Medication 100 MILLIGRAM(S): at 22:22

## 2018-07-22 RX ADMIN — MIRTAZAPINE 30 MILLIGRAM(S): 45 TABLET, ORALLY DISINTEGRATING ORAL at 22:22

## 2018-07-22 RX ADMIN — OXYCODONE HYDROCHLORIDE 10 MILLIGRAM(S): 5 TABLET ORAL at 23:24

## 2018-07-22 RX ADMIN — Medication 1 PATCH: at 11:53

## 2018-07-22 RX ADMIN — Medication 10 UNIT(S): at 17:23

## 2018-07-22 RX ADMIN — ATORVASTATIN CALCIUM 20 MILLIGRAM(S): 80 TABLET, FILM COATED ORAL at 22:22

## 2018-07-22 RX ADMIN — Medication 10 UNIT(S): at 08:30

## 2018-07-22 RX ADMIN — Medication 0.5 MILLIGRAM(S): at 22:22

## 2018-07-22 RX ADMIN — GABAPENTIN 600 MILLIGRAM(S): 400 CAPSULE ORAL at 17:20

## 2018-07-22 RX ADMIN — Medication 2 MILLIGRAM(S): at 06:22

## 2018-07-22 RX ADMIN — GABAPENTIN 600 MILLIGRAM(S): 400 CAPSULE ORAL at 06:23

## 2018-07-22 RX ADMIN — FAMOTIDINE 20 MILLIGRAM(S): 10 INJECTION INTRAVENOUS at 06:23

## 2018-07-22 RX ADMIN — Medication 50 MICROGRAM(S): at 06:23

## 2018-07-22 RX ADMIN — Medication 0.5 MILLIGRAM(S): at 06:22

## 2018-07-22 RX ADMIN — LISINOPRIL 2.5 MILLIGRAM(S): 2.5 TABLET ORAL at 06:23

## 2018-07-22 RX ADMIN — Medication 81 MILLIGRAM(S): at 11:55

## 2018-07-22 RX ADMIN — Medication 20 MILLIGRAM(S): at 17:20

## 2018-07-22 RX ADMIN — Medication 20 MILLIGRAM(S): at 06:22

## 2018-07-22 RX ADMIN — Medication 6: at 17:59

## 2018-07-22 RX ADMIN — OXYCODONE HYDROCHLORIDE 10 MILLIGRAM(S): 5 TABLET ORAL at 08:44

## 2018-07-22 RX ADMIN — OXYCODONE HYDROCHLORIDE 10 MILLIGRAM(S): 5 TABLET ORAL at 22:22

## 2018-07-22 RX ADMIN — Medication 1 TABLET(S): at 17:20

## 2018-07-23 DIAGNOSIS — E03.9 HYPOTHYROIDISM, UNSPECIFIED: ICD-10-CM

## 2018-07-23 DIAGNOSIS — E11.65 TYPE 2 DIABETES MELLITUS WITH HYPERGLYCEMIA: ICD-10-CM

## 2018-07-23 DIAGNOSIS — Q76.1 KLIPPEL-FEIL SYNDROME: ICD-10-CM

## 2018-07-23 DIAGNOSIS — M48.061 SPINAL STENOSIS, LUMBAR REGION WITHOUT NEUROGENIC CLAUDICATION: ICD-10-CM

## 2018-07-23 DIAGNOSIS — F43.23 ADJUSTMENT DISORDER WITH MIXED ANXIETY AND DEPRESSED MOOD: ICD-10-CM

## 2018-07-23 DIAGNOSIS — F33.1 MAJOR DEPRESSIVE DISORDER, RECURRENT, MODERATE: ICD-10-CM

## 2018-07-23 DIAGNOSIS — F32.1 MAJOR DEPRESSIVE DISORDER, SINGLE EPISODE, MODERATE: ICD-10-CM

## 2018-07-23 DIAGNOSIS — E78.00 PURE HYPERCHOLESTEROLEMIA, UNSPECIFIED: ICD-10-CM

## 2018-07-23 DIAGNOSIS — N12 TUBULO-INTERSTITIAL NEPHRITIS, NOT SPECIFIED AS ACUTE OR CHRONIC: ICD-10-CM

## 2018-07-23 DIAGNOSIS — F43.10 POST-TRAUMATIC STRESS DISORDER, UNSPECIFIED: ICD-10-CM

## 2018-07-23 DIAGNOSIS — Z91.14 PATIENT'S OTHER NONCOMPLIANCE WITH MEDICATION REGIMEN: ICD-10-CM

## 2018-07-23 LAB
ALBUMIN SERPL ELPH-MCNC: 3 G/DL — LOW (ref 3.3–5)
ALP SERPL-CCNC: 100 U/L — SIGNIFICANT CHANGE UP (ref 40–120)
ALT FLD-CCNC: 21 U/L — SIGNIFICANT CHANGE UP (ref 12–78)
ANION GAP SERPL CALC-SCNC: 8 MMOL/L — SIGNIFICANT CHANGE UP (ref 5–17)
AST SERPL-CCNC: 10 U/L — LOW (ref 15–37)
BASOPHILS # BLD AUTO: 0.07 K/UL — SIGNIFICANT CHANGE UP (ref 0–0.2)
BASOPHILS NFR BLD AUTO: 0.5 % — SIGNIFICANT CHANGE UP (ref 0–2)
BILIRUB SERPL-MCNC: 0.3 MG/DL — SIGNIFICANT CHANGE UP (ref 0.2–1.2)
BUN SERPL-MCNC: 28 MG/DL — HIGH (ref 7–23)
CALCIUM SERPL-MCNC: 8.6 MG/DL — SIGNIFICANT CHANGE UP (ref 8.5–10.1)
CHLORIDE SERPL-SCNC: 105 MMOL/L — SIGNIFICANT CHANGE UP (ref 96–108)
CO2 SERPL-SCNC: 25 MMOL/L — SIGNIFICANT CHANGE UP (ref 22–31)
CREAT SERPL-MCNC: 0.99 MG/DL — SIGNIFICANT CHANGE UP (ref 0.5–1.3)
EOSINOPHIL # BLD AUTO: 0.07 K/UL — SIGNIFICANT CHANGE UP (ref 0–0.5)
EOSINOPHIL NFR BLD AUTO: 0.5 % — SIGNIFICANT CHANGE UP (ref 0–6)
GLUCOSE SERPL-MCNC: 373 MG/DL — HIGH (ref 70–99)
HCT VFR BLD CALC: 29.6 % — LOW (ref 34.5–45)
HGB BLD-MCNC: 9.6 G/DL — LOW (ref 11.5–15.5)
IMM GRANULOCYTES NFR BLD AUTO: 1.1 % — SIGNIFICANT CHANGE UP (ref 0–1.5)
LYMPHOCYTES # BLD AUTO: 2.68 K/UL — SIGNIFICANT CHANGE UP (ref 1–3.3)
LYMPHOCYTES # BLD AUTO: 20.6 % — SIGNIFICANT CHANGE UP (ref 13–44)
MCHC RBC-ENTMCNC: 19.9 PG — LOW (ref 27–34)
MCHC RBC-ENTMCNC: 32.4 GM/DL — SIGNIFICANT CHANGE UP (ref 32–36)
MCV RBC AUTO: 61.4 FL — LOW (ref 80–100)
MONOCYTES # BLD AUTO: 0.84 K/UL — SIGNIFICANT CHANGE UP (ref 0–0.9)
MONOCYTES NFR BLD AUTO: 6.5 % — SIGNIFICANT CHANGE UP (ref 2–14)
NEUTROPHILS # BLD AUTO: 9.22 K/UL — HIGH (ref 1.8–7.4)
NEUTROPHILS NFR BLD AUTO: 70.8 % — SIGNIFICANT CHANGE UP (ref 43–77)
NRBC # BLD: 0 /100 WBCS — SIGNIFICANT CHANGE UP (ref 0–0)
PLATELET # BLD AUTO: 240 K/UL — SIGNIFICANT CHANGE UP (ref 150–400)
POTASSIUM SERPL-MCNC: 4.2 MMOL/L — SIGNIFICANT CHANGE UP (ref 3.5–5.3)
POTASSIUM SERPL-SCNC: 4.2 MMOL/L — SIGNIFICANT CHANGE UP (ref 3.5–5.3)
PROT SERPL-MCNC: 6.9 G/DL — SIGNIFICANT CHANGE UP (ref 6–8.3)
RBC # BLD: 4.82 M/UL — SIGNIFICANT CHANGE UP (ref 3.8–5.2)
RBC # FLD: 16.2 % — HIGH (ref 10.3–14.5)
SODIUM SERPL-SCNC: 138 MMOL/L — SIGNIFICANT CHANGE UP (ref 135–145)
WBC # BLD: 13.02 K/UL — HIGH (ref 3.8–10.5)
WBC # FLD AUTO: 13.02 K/UL — HIGH (ref 3.8–10.5)

## 2018-07-23 PROCEDURE — 74177 CT ABD & PELVIS W/CONTRAST: CPT | Mod: 26

## 2018-07-23 PROCEDURE — 99232 SBSQ HOSP IP/OBS MODERATE 35: CPT

## 2018-07-23 RX ORDER — IOHEXOL 300 MG/ML
500 INJECTION, SOLUTION INTRAVENOUS
Refills: 0 | Status: COMPLETED | OUTPATIENT
Start: 2018-07-23 | End: 2018-07-23

## 2018-07-23 RX ADMIN — MIRTAZAPINE 30 MILLIGRAM(S): 45 TABLET, ORALLY DISINTEGRATING ORAL at 22:40

## 2018-07-23 RX ADMIN — OXYCODONE HYDROCHLORIDE 10 MILLIGRAM(S): 5 TABLET ORAL at 16:41

## 2018-07-23 RX ADMIN — Medication 1 TABLET(S): at 18:01

## 2018-07-23 RX ADMIN — Medication 0.5 MILLIGRAM(S): at 05:40

## 2018-07-23 RX ADMIN — Medication 10 UNIT(S): at 18:01

## 2018-07-23 RX ADMIN — Medication 650 MILLIGRAM(S): at 01:03

## 2018-07-23 RX ADMIN — Medication 50 MICROGRAM(S): at 05:40

## 2018-07-23 RX ADMIN — INSULIN GLARGINE 15 UNIT(S): 100 INJECTION, SOLUTION SUBCUTANEOUS at 08:32

## 2018-07-23 RX ADMIN — FAMOTIDINE 20 MILLIGRAM(S): 10 INJECTION INTRAVENOUS at 18:01

## 2018-07-23 RX ADMIN — IOHEXOL 500 MILLILITER(S): 300 INJECTION, SOLUTION INTRAVENOUS at 14:25

## 2018-07-23 RX ADMIN — Medication 20 MILLIGRAM(S): at 05:41

## 2018-07-23 RX ADMIN — Medication 0.5 MILLIGRAM(S): at 20:27

## 2018-07-23 RX ADMIN — ATORVASTATIN CALCIUM 20 MILLIGRAM(S): 80 TABLET, FILM COATED ORAL at 22:40

## 2018-07-23 RX ADMIN — ONDANSETRON 4 MILLIGRAM(S): 8 TABLET, FILM COATED ORAL at 13:34

## 2018-07-23 RX ADMIN — Medication 3 MILLILITER(S): at 20:26

## 2018-07-23 RX ADMIN — Medication 1 TABLET(S): at 08:34

## 2018-07-23 RX ADMIN — Medication 6: at 12:46

## 2018-07-23 RX ADMIN — Medication 0.5 MILLIGRAM(S): at 22:40

## 2018-07-23 RX ADMIN — Medication 10 UNIT(S): at 08:31

## 2018-07-23 RX ADMIN — Medication 100 MILLIGRAM(S): at 22:40

## 2018-07-23 RX ADMIN — OXYCODONE HYDROCHLORIDE 10 MILLIGRAM(S): 5 TABLET ORAL at 08:47

## 2018-07-23 RX ADMIN — Medication 3 MILLILITER(S): at 14:17

## 2018-07-23 RX ADMIN — Medication 3 MILLILITER(S): at 07:49

## 2018-07-23 RX ADMIN — IOHEXOL 500 MILLILITER(S): 300 INJECTION, SOLUTION INTRAVENOUS at 13:31

## 2018-07-23 RX ADMIN — LISINOPRIL 2.5 MILLIGRAM(S): 2.5 TABLET ORAL at 05:40

## 2018-07-23 RX ADMIN — Medication 20 MILLIGRAM(S): at 18:01

## 2018-07-23 RX ADMIN — OXYCODONE HYDROCHLORIDE 10 MILLIGRAM(S): 5 TABLET ORAL at 09:47

## 2018-07-23 RX ADMIN — OXYCODONE HYDROCHLORIDE 10 MILLIGRAM(S): 5 TABLET ORAL at 17:40

## 2018-07-23 RX ADMIN — Medication 0.5 MILLIGRAM(S): at 07:49

## 2018-07-23 RX ADMIN — Medication 4: at 18:01

## 2018-07-23 RX ADMIN — Medication 10: at 08:31

## 2018-07-23 RX ADMIN — Medication 81 MILLIGRAM(S): at 11:37

## 2018-07-23 RX ADMIN — INSULIN GLARGINE 30 UNIT(S): 100 INJECTION, SOLUTION SUBCUTANEOUS at 22:41

## 2018-07-23 RX ADMIN — Medication 10 UNIT(S): at 12:45

## 2018-07-23 RX ADMIN — Medication 650 MILLIGRAM(S): at 00:03

## 2018-07-23 RX ADMIN — GABAPENTIN 600 MILLIGRAM(S): 400 CAPSULE ORAL at 18:01

## 2018-07-23 RX ADMIN — Medication 0.5 MILLIGRAM(S): at 13:31

## 2018-07-23 RX ADMIN — FAMOTIDINE 20 MILLIGRAM(S): 10 INJECTION INTRAVENOUS at 05:41

## 2018-07-23 RX ADMIN — GABAPENTIN 600 MILLIGRAM(S): 400 CAPSULE ORAL at 05:41

## 2018-07-23 RX ADMIN — CEFTRIAXONE 100 GRAM(S): 500 INJECTION, POWDER, FOR SOLUTION INTRAMUSCULAR; INTRAVENOUS at 13:31

## 2018-07-23 RX ADMIN — CITALOPRAM 20 MILLIGRAM(S): 10 TABLET, FILM COATED ORAL at 22:40

## 2018-07-23 RX ADMIN — Medication 1 PATCH: at 08:33

## 2018-07-23 NOTE — CONSULT NOTE ADULT - SUBJECTIVE AND OBJECTIVE BOX
Patient is a 47y old  Female who presents with a chief complaint of nausea , abdominal pain , not feeling well , weak x few days weeks (21 Jul 2018 13:29)    HPI:  this is a 47 yo female , single no kids , with hx of chronic pain syndrome on multiple pain meds by pain management , , and neuropathy and diabetes and poor control and poor compliance diabetes and severe s recurrent major depression and anxiety disorder who follows with psych  and hx of multiple c spine , ls spine and abdominal surgeries and carpal tunnel syndrome and gyn surgeries for hysterectomy  , and recent mrsa post abdominal gyn surgery requiring home iv abx for 6-8 weeks , hx of laminectomy and hardware in ls spine and c spine , obesity , poor compliance with diabetes , hx of psych admission , p  patient presented to office yesterday and complain of abdominal pain , nausea , weakness , lethargy and poor po intake ,   in er anemia , lactae high , ua positive , sugar high and ct abdomen with ? pyelonephritis ,   admit control blood sugar and dm , educate , IV abx for ? kidney infection , probiotics , pain control , (20 Jul 2018 07:46)    Renal consult called for ? Pyelonephritis, mild hyponatremia. History obtained from chart and pt.   On IV abx. Pt concerned about ? interstitial nephritis information that was give to her from the ER.       PAST MEDICAL HISTORY:  High cholesterol  Post traumatic stress disorder  Depression  Diabetes      PAST SURGICAL HISTORY:  Cervical fusion syndrome      FAMILY HISTORY:      SOCIAL HISTORY: No smoking or alcohol use     Allergies    apple (Unknown)  Drug Allergies Not Recorded  Peaches (Unknown)  raw fruits and vetables (Unknown)    Intolerances      Home Medications:  aspirin 81 mg oral delayed release tablet: 1 tab(s) orally once a day (21 Jul 2018 13:32)  citalopram:  (20 Jul 2018 01:11)  famotidine 20 mg oral tablet: 1 tab(s) orally 2 times a day (21 Jul 2018 13:32)  furosemide 20 mg oral tablet: 1 tab(s) orally 2 times a day (20 Jul 2018 01:11)  gabapentin 600 mg oral tablet: 1 tab(s) orally 2 times a day (20 Jul 2018 01:11)  insulin:  (20 Jul 2018 01:11)  insulin glargine:  (21 Jul 2018 13:32)  insulin lispro (concentrated) 200 units/mL subcutaneous solution:  subcutaneous  (21 Jul 2018 13:32)  KlonoPIN:  (20 Jul 2018 01:11)  lactobacillus acidophilus oral capsule:  orally  (21 Jul 2018 13:32)  Lipitor:  (20 Jul 2018 01:11)  lisinopril 2.5 mg oral tablet: 1 tab(s) orally once a day (20 Jul 2018 01:11)  mirtazapine 45 mg oral tablet, disintegrating:  (20 Jul 2018 01:11)  nicotine 14 mg/24 hr transdermal film, extended release:  transdermal  (21 Jul 2018 13:32)  oxyCODONE 15 mg oral tablet, extended release: 1 tab(s) orally 3 times a day (with meals) (20 Jul 2018 01:11)  Singulair 10 mg oral tablet:  (20 Jul 2018 01:11)  Synthroid 50 mcg (0.05 mg) oral tablet: 1 tab(s) orally once a day (20 Jul 2018 01:11)  traZODone 100 mg oral tablet: 200 milligram(s) orally once a day (at bedtime) (20 Jul 2018 01:11)  Ventolin:  (20 Jul 2018 01:11)    MEDICATIONS  (STANDING):  ALBUTerol/ipratropium for Nebulization 3 milliLiter(s) Nebulizer three times a day  aspirin enteric coated 81 milliGRAM(s) Oral daily  atorvastatin 20 milliGRAM(s) Oral at bedtime  buDESOnide   0.5 milliGRAM(s) Respule 0.5 milliGRAM(s) Inhalation two times a day  cefTRIAXone   IVPB 1 Gram(s) IV Intermittent every 24 hours  citalopram 20 milliGRAM(s) Oral daily  clonazePAM Tablet 0.5 milliGRAM(s) Oral three times a day  dextrose 5%. 1000 milliLiter(s) (50 mL/Hr) IV Continuous <Continuous>  dextrose 5%. 1000 milliLiter(s) (50 mL/Hr) IV Continuous <Continuous>  dextrose 50% Injectable 12.5 Gram(s) IV Push once  dextrose 50% Injectable 25 Gram(s) IV Push once  dextrose 50% Injectable 25 Gram(s) IV Push once  dextrose 50% Injectable 12.5 Gram(s) IV Push once  dextrose 50% Injectable 25 Gram(s) IV Push once  dextrose 50% Injectable 25 Gram(s) IV Push once  enoxaparin Injectable 40 milliGRAM(s) SubCutaneous daily  famotidine    Tablet 20 milliGRAM(s) Oral two times a day  furosemide    Tablet 20 milliGRAM(s) Oral two times a day  gabapentin 600 milliGRAM(s) Oral two times a day  insulin glargine Injectable (LANTUS) 30 Unit(s) SubCutaneous at bedtime  insulin glargine Injectable (LANTUS) 15 Unit(s) SubCutaneous every morning  insulin lispro (HumaLOG) corrective regimen sliding scale   SubCutaneous three times a day before meals  insulin lispro Injectable (HumaLOG) 10 Unit(s) SubCutaneous before breakfast  insulin lispro Injectable (HumaLOG) 10 Unit(s) SubCutaneous before lunch  insulin lispro Injectable (HumaLOG) 10 Unit(s) SubCutaneous before dinner  iohexol 300 mG (iodine)/mL Oral Solution 500 milliLiter(s) Oral every 1 hour  lactobacillus acidophilus 1 Tablet(s) Oral two times a day with meals  levothyroxine 50 MICROGram(s) Oral daily  lisinopril 2.5 milliGRAM(s) Oral daily  mirtazapine Soltab 30 milliGRAM(s) Oral daily  nicotine -  14 mG/24Hr(s) Patch 1 patch Transdermal daily  traZODone 100 milliGRAM(s) Oral at bedtime    MEDICATIONS  (PRN):  acetaminophen   Tablet. 650 milliGRAM(s) Oral every 6 hours PRN Mild Pain (1 - 3)  dextrose 40% Gel 15 Gram(s) Oral once PRN Blood Glucose LESS THAN 70 milliGRAM(s)/deciliter  dextrose 40% Gel 15 Gram(s) Oral once PRN Blood Glucose LESS THAN 70 milliGRAM(s)/deciliter  glucagon  Injectable 1 milliGRAM(s) IntraMuscular once PRN Glucose LESS THAN 70 milligrams/deciliter  glucagon  Injectable 1 milliGRAM(s) IntraMuscular once PRN Glucose LESS THAN 70 milligrams/deciliter  ondansetron Injectable 4 milliGRAM(s) IV Push every 6 hours PRN Nausea and/or Vomiting  oxyCODONE    IR 10 milliGRAM(s) Oral four times a day PRN Moderate Pain (4 - 6)      REVIEW OF SYSTEMS:  General: NAD  Respiratory: No cough, SOB  Cardiovascular: No CP or Palpitations	  Gastrointestinal: No nausea, Vomiting. No diarrhea  Genitourinary: No urinary complaints	  Musculoskeletal: ? back pain      T(F): 98 (07-23-18 @ 05:24), Max: 98.4 (07-22-18 @ 20:09)  HR: 60 (07-23-18 @ 07:30) (53 - 61)  BP: 145/76 (07-23-18 @ 05:24) (145/76 - 146/-)  RR: 17 (07-23-18 @ 05:24) (17 - 17)  SpO2: 95% (07-23-18 @ 07:30) (95% - 98%)  Wt(kg): --    PHYSICAL EXAM:  General: NAD  Respiratory: b/l air entry  Cardiovascular: S1 S2  Gastrointestinal: soft  Extremities: no edema        07-23    138  |  105  |  28<H>  ----------------------------<  373<H>  4.2   |  25  |  0.99    Ca    8.6      23 Jul 2018 07:06    TPro  6.9  /  Alb  3.0<L>  /  TBili  0.3  /  DBili  x   /  AST  10<L>  /  ALT  21  /  AlkPhos  100  07-23                          9.6    13.02 )-----------( 240      ( 23 Jul 2018 07:06 )             29.6       Hemoglobin: 9.6 g/dL (07-23 @ 07:06)  Hematocrit: 29.6 % (07-23 @ 07:06)  Potassium, Serum: 4.2 mmol/L (07-23 @ 07:06)  Blood Urea Nitrogen, Serum: 28 mg/dL (07-23 @ 07:06)      Creatinine, Serum: 0.99 (07-23 @ 07:06)        LIVER FUNCTIONS - ( 23 Jul 2018 07:06 )  Alb: 3.0 g/dL / Pro: 6.9 g/dL / ALK PHOS: 100 U/L / ALT: 21 U/L / AST: 10 U/L / GGT: x             Culture - Urine (collected 19 Jul 2018 23:20)  Source: .Urine Clean Catch (Midstream)  Final Report (20 Jul 2018 23:25):    <10,000 CFU/ml    Normal Urogenital elizabeth present    Culture - Blood (collected 19 Jul 2018 21:16)  Source: .Blood Blood-Venous  Preliminary Report (20 Jul 2018 22:02):    No growth to date.    Culture - Blood (collected 19 Jul 2018 21:16)  Source: .Blood Blood-Peripheral  Preliminary Report (20 Jul 2018 22:02):    No growth to date.      < from: CT Abdomen and Pelvis w/ Oral Cont (07.19.18 @ 22:20) >  EXAM:  CT ABDOMEN AND PELVIS OC                            PROCEDURE DATE:  07/19/2018          INTERPRETATION:  CLINICAL INFORMATION: Left lower quadrant abdominal pain   and fever. Back surgery in February.    TECHNIQUE: Noncontrast CT of the abdomen and pelvis was performed with   coronal and sagittal reformats. Oral contrast was administered.    COMPARISON: CT abdomen and pelvis 1/16/2012.    FINDINGS:  Assessment of solid organs and viscera is limited without intravenous   contrast enhancement.    LOWER CHEST: Within normal limits.    HEPATOBILIARY: Hypodense hepatic lesion too small to characterize.   Cholecystectomy. Hepatomegaly. No biliary ductal dilation.  PANCREAS: Atrophic.  SPLEEN: Within normal limits.  ADRENALS: Within normallimits.    KIDNEYS/URETERS/BLADDER: Distended urinary bladder. Trace bilateral   perinephric fat stranding. No hydronephrosis or urinary calculus.    REPRODUCTIVE ORGANS: 2.7 cm left adnexal cyst. Correlate with surgical   history for supracervical hysterectomy.    BOWEL/PERITONEUM:  No bowel obstruction, inflammation or   pneumoperitoneum.  Appendectomy. Portions of the gastrointestinal tract   are collapsed, limiting evaluation.     VESSELS:  Within normal limits.  LYMPHATICS/RETROPERITONEUM: No lymphadenopathy. No retroperitoneal   hematoma.      SOFT TISSUES: Laparotomy scar.  BONES: L3-L5 laminectomy and L4-S1 posterior spinal fusion. Bilateral L4,   right L5 and bilateral S1 transpedicular screws with interconnecting rods   and bone graft. Hardware artifact limits evaluation of the surrounding   structures.    IMPRESSION: No acute findings. Urinary bladder distention and nonspecific   trace bilateral perinephric fat stranding. 2.7 cm left adnexal cyst.    < end of copied text >

## 2018-07-23 NOTE — PROGRESS NOTE ADULT - PROBLEM SELECTOR PLAN 1
on iv Rocephin , will repeat ct renal with iv contrast , and premed with steroids due to potential hx of ? asthma exacerbation form iv contrast , she did ok with oral contrast , history is vague on iv Rocephin , will repeat ct renal with iv contrast ,   patient questioned , and claims that she had ct abdomen and pelvis with iv and po contrast at [facility] radiology 1 yr ago , with out any untoward sequale , and no allergic reaction , [facility] called and ct obtained ,

## 2018-07-23 NOTE — ADVANCED PRACTICE NURSE CONSULT - ASSESSMENT
Vital Signs Last 24 Hrs  T(C): 36.8 (23 Jul 2018 13:38), Max: 36.9 (22 Jul 2018 20:09)  T(F): 98.3 (23 Jul 2018 13:38), Max: 98.4 (22 Jul 2018 20:09)  HR: 73 (23 Jul 2018 17:56) (53 - 73)  BP: 164/83 (23 Jul 2018 17:56) (139/78 - 164/83)  BP(mean): --  RR: 18 (23 Jul 2018 13:38) (17 - 18)  SpO2: 95% (23 Jul 2018 14:18) (95% - 98%)    Hemoglobin A1C, Whole Blood: 11.0 % (07-22-18 @ 13:45)   eGFR if Non African American: 68 mL/min/1.73M2 (07-23-18 @ 07:06)  eGFR if African American: 79 mL/min/1.73M2 (07-23-18 @ 07:06)      CAPILLARY BLOOD GLUCOSE      POCT Blood Glucose.: 201 mg/dL (23 Jul 2018 17:43)  POCT Blood Glucose.: 266 mg/dL (23 Jul 2018 12:06)  POCT Blood Glucose.: 380 mg/dL (23 Jul 2018 08:17)  POCT Blood Glucose.: 226 mg/dL (22 Jul 2018 22:17)      DIET:

## 2018-07-23 NOTE — ADVANCED PRACTICE NURSE CONSULT - RECOMMEDATIONS
T2D uncontrolled a1c 11%  No recommendations at this time- insulin therapy   continue with psych support-as requested by patient  see flowsheets for diabetes education provided  will return to see patient on Wednesday  cell #/email given to patient as a resource  goal 100-180 mg/dL

## 2018-07-23 NOTE — BEHAVIORAL HEALTH ASSESSMENT NOTE - HPI (INCLUDE ILLNESS QUALITY, SEVERITY, DURATION, TIMING, CONTEXT, MODIFYING FACTORS, ASSOCIATED SIGNS AND SYMPTOMS)
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  Patient presented to office complaining of abdominal pain , nausea , weakness , lethargy and poor po intake , in er anemia , lactae high , ua positive , sugar high and ct abdomen with ? Pyelonephritis.  Patient reports worsening mood over the last several weeks, feeling alone, as the family support is limited. She reports worsened sleep, worsened interest, poor energy and concentration, worsened appetite. Patient presents with somewhat limited insight into a possible drug dependency issue, and at this time is considering inpatient admission due to persistent depression.

## 2018-07-23 NOTE — CONSULT NOTE ADULT - ASSESSMENT
·	Mild hyponatremia  ·	? Pyelonephritis  ·	? Urinary retention  ·	Depression  ·	Proteinuria    Sodium levels improved. Gentle IV hydration if PO intake not adequate.   On IV abx. ? Diagnosis of Interstitial nephritis given to pt by another doctor. Will check spot urine for protein/crea ratio.   Pt with poorly controlled diabetes. Possibility of diabetic nephropathy.   Psych evaluation. Will follow electrolytes and renal function trend.   Further recommendations pending clinical course. Thank you for the courtesy of this referral.

## 2018-07-23 NOTE — BEHAVIORAL HEALTH ASSESSMENT NOTE - DESCRIPTION (FIRST USE, LAST USE, QUANTITY, FREQUENCY, DURATION)
Smokes 1/2 PPD Patient is on high doses of benzodiazepines for several years Patient is on chronic opioid regiment for several years

## 2018-07-23 NOTE — PROGRESS NOTE ADULT - ATTENDING COMMENTS
patient denies allergic reaction to iv contrast in past , confirmed with [facility] where she had her ct abdoman 1 yr ago ,   she never had a reaction with ct iv contrast , she has read that one can ,

## 2018-07-23 NOTE — ADVANCED PRACTICE NURSE CONSULT - REASON FOR CONSULT
47y    Female    Patient is a 47y old  Female who presents with a chief complaint of nausea , abdominal pain , not feeling well , weak x few days weeks (21 Jul 2018 13:29)    SW patient at length 45 mins. Patient stating difficulty managing diabetes due to "not feeling well". Discuss diabetes consistent carb diet and ways to adhere to good nutrition. Administers Tresiba 22 units at night states misses doses at times...feels could benefit from taking in am.   HPI:  this is a 47 yo female , single no kids , with hx of chronic pain syndrome on multiple pain meds by pain management , , and neuropathy and diabetes and poor control and poor compliance diabetes and severe s recurrent major depression and anxiety disorder who follows with psych  and hx of multiple c spine , ls spine and abdominal surgeries and carpal tunnel syndrome and gyn surgeries for hysterectomy  , and recent mrsa post abdominal gyn surgery requiring home iv abx for 6-8 weeks , hx of laminectomy and hardware in ls spine and c spine , obesity , poor compliance with diabetes , hx of psych admission , p  patient presented to office yesterday and complain of abdominal pain , nausea , weakness , lethargy and poor po intake ,   in er anemia , lactae high , ua positive , sugar high and ct abdomen with ? pyelonephritis ,   admit control blood sugar and dm , educate , IV abx for ? kidney infection , probiotics , pain control , (20 Jul 2018 07:46)      PAST MEDICAL & SURGICAL HISTORY:  High cholesterol  Post traumatic stress disorder  Depression  Diabetes  Cervical fusion syndrome      MEDICATIONS  (STANDING):  ALBUTerol/ipratropium for Nebulization 3 milliLiter(s) Nebulizer three times a day  aspirin enteric coated 81 milliGRAM(s) Oral daily  atorvastatin 20 milliGRAM(s) Oral at bedtime  buDESOnide   0.5 milliGRAM(s) Respule 0.5 milliGRAM(s) Inhalation two times a day  cefTRIAXone   IVPB 1 Gram(s) IV Intermittent every 24 hours  citalopram 20 milliGRAM(s) Oral daily  clonazePAM Tablet 0.5 milliGRAM(s) Oral three times a day  dextrose 5%. 1000 milliLiter(s) (50 mL/Hr) IV Continuous <Continuous>  dextrose 5%. 1000 milliLiter(s) (50 mL/Hr) IV Continuous <Continuous>  dextrose 50% Injectable 12.5 Gram(s) IV Push once  dextrose 50% Injectable 25 Gram(s) IV Push once  dextrose 50% Injectable 25 Gram(s) IV Push once  dextrose 50% Injectable 12.5 Gram(s) IV Push once  dextrose 50% Injectable 25 Gram(s) IV Push once  dextrose 50% Injectable 25 Gram(s) IV Push once  enoxaparin Injectable 40 milliGRAM(s) SubCutaneous daily  famotidine    Tablet 20 milliGRAM(s) Oral two times a day  furosemide    Tablet 20 milliGRAM(s) Oral two times a day  gabapentin 600 milliGRAM(s) Oral two times a day  insulin glargine Injectable (LANTUS) 30 Unit(s) SubCutaneous at bedtime  insulin glargine Injectable (LANTUS) 15 Unit(s) SubCutaneous every morning  insulin lispro (HumaLOG) corrective regimen sliding scale   SubCutaneous three times a day before meals  insulin lispro Injectable (HumaLOG) 10 Unit(s) SubCutaneous before breakfast  insulin lispro Injectable (HumaLOG) 10 Unit(s) SubCutaneous before lunch  insulin lispro Injectable (HumaLOG) 10 Unit(s) SubCutaneous before dinner  lactobacillus acidophilus 1 Tablet(s) Oral two times a day with meals  levothyroxine 50 MICROGram(s) Oral daily  lisinopril 2.5 milliGRAM(s) Oral daily  mirtazapine Soltab 30 milliGRAM(s) Oral daily  nicotine -  14 mG/24Hr(s) Patch 1 patch Transdermal daily  traZODone 100 milliGRAM(s) Oral at bedtime

## 2018-07-24 LAB
ALBUMIN SERPL ELPH-MCNC: 3.1 G/DL — LOW (ref 3.3–5)
ALP SERPL-CCNC: 103 U/L — SIGNIFICANT CHANGE UP (ref 40–120)
ALT FLD-CCNC: 24 U/L — SIGNIFICANT CHANGE UP (ref 12–78)
ANION GAP SERPL CALC-SCNC: 9 MMOL/L — SIGNIFICANT CHANGE UP (ref 5–17)
AST SERPL-CCNC: 14 U/L — LOW (ref 15–37)
BILIRUB SERPL-MCNC: 0.4 MG/DL — SIGNIFICANT CHANGE UP (ref 0.2–1.2)
BUN SERPL-MCNC: 25 MG/DL — HIGH (ref 7–23)
CALCIUM SERPL-MCNC: 8.9 MG/DL — SIGNIFICANT CHANGE UP (ref 8.5–10.1)
CHLORIDE SERPL-SCNC: 106 MMOL/L — SIGNIFICANT CHANGE UP (ref 96–108)
CO2 SERPL-SCNC: 24 MMOL/L — SIGNIFICANT CHANGE UP (ref 22–31)
CREAT SERPL-MCNC: 0.86 MG/DL — SIGNIFICANT CHANGE UP (ref 0.5–1.3)
CULTURE RESULTS: SIGNIFICANT CHANGE UP
CULTURE RESULTS: SIGNIFICANT CHANGE UP
DSDNA AB SER-ACNC: <12 IU/ML — SIGNIFICANT CHANGE UP
GLUCOSE SERPL-MCNC: 240 MG/DL — HIGH (ref 70–99)
HCT VFR BLD CALC: 29.9 % — LOW (ref 34.5–45)
HGB BLD-MCNC: 9.6 G/DL — LOW (ref 11.5–15.5)
MCHC RBC-ENTMCNC: 19.9 PG — LOW (ref 27–34)
MCHC RBC-ENTMCNC: 32.1 GM/DL — SIGNIFICANT CHANGE UP (ref 32–36)
MCV RBC AUTO: 62 FL — LOW (ref 80–100)
NRBC # BLD: 0 /100 WBCS — SIGNIFICANT CHANGE UP (ref 0–0)
PLATELET # BLD AUTO: 232 K/UL — SIGNIFICANT CHANGE UP (ref 150–400)
POTASSIUM SERPL-MCNC: 4.3 MMOL/L — SIGNIFICANT CHANGE UP (ref 3.5–5.3)
POTASSIUM SERPL-SCNC: 4.3 MMOL/L — SIGNIFICANT CHANGE UP (ref 3.5–5.3)
PROT SERPL-MCNC: 7.4 G/DL — SIGNIFICANT CHANGE UP (ref 6–8.3)
RBC # BLD: 4.82 M/UL — SIGNIFICANT CHANGE UP (ref 3.8–5.2)
RBC # FLD: 16.4 % — HIGH (ref 10.3–14.5)
SODIUM SERPL-SCNC: 139 MMOL/L — SIGNIFICANT CHANGE UP (ref 135–145)
SPECIMEN SOURCE: SIGNIFICANT CHANGE UP
SPECIMEN SOURCE: SIGNIFICANT CHANGE UP
WBC # BLD: 12.27 K/UL — HIGH (ref 3.8–10.5)
WBC # FLD AUTO: 12.27 K/UL — HIGH (ref 3.8–10.5)

## 2018-07-24 RX ADMIN — OXYCODONE HYDROCHLORIDE 10 MILLIGRAM(S): 5 TABLET ORAL at 09:28

## 2018-07-24 RX ADMIN — Medication 8: at 12:52

## 2018-07-24 RX ADMIN — OXYCODONE HYDROCHLORIDE 10 MILLIGRAM(S): 5 TABLET ORAL at 18:50

## 2018-07-24 RX ADMIN — Medication 3 MILLILITER(S): at 08:05

## 2018-07-24 RX ADMIN — Medication 20 MILLIGRAM(S): at 17:28

## 2018-07-24 RX ADMIN — FAMOTIDINE 20 MILLIGRAM(S): 10 INJECTION INTRAVENOUS at 06:17

## 2018-07-24 RX ADMIN — Medication 3 MILLILITER(S): at 19:55

## 2018-07-24 RX ADMIN — FAMOTIDINE 20 MILLIGRAM(S): 10 INJECTION INTRAVENOUS at 17:28

## 2018-07-24 RX ADMIN — CITALOPRAM 20 MILLIGRAM(S): 10 TABLET, FILM COATED ORAL at 21:38

## 2018-07-24 RX ADMIN — Medication 2: at 17:27

## 2018-07-24 RX ADMIN — Medication 50 MICROGRAM(S): at 06:17

## 2018-07-24 RX ADMIN — Medication 1 TABLET(S): at 08:15

## 2018-07-24 RX ADMIN — Medication 10 UNIT(S): at 08:16

## 2018-07-24 RX ADMIN — MIRTAZAPINE 30 MILLIGRAM(S): 45 TABLET, ORALLY DISINTEGRATING ORAL at 21:38

## 2018-07-24 RX ADMIN — Medication 10 UNIT(S): at 17:27

## 2018-07-24 RX ADMIN — CEFTRIAXONE 100 GRAM(S): 500 INJECTION, POWDER, FOR SOLUTION INTRAMUSCULAR; INTRAVENOUS at 13:57

## 2018-07-24 RX ADMIN — Medication 20 MILLIGRAM(S): at 06:17

## 2018-07-24 RX ADMIN — ONDANSETRON 4 MILLIGRAM(S): 8 TABLET, FILM COATED ORAL at 13:57

## 2018-07-24 RX ADMIN — Medication 0.5 MILLIGRAM(S): at 06:16

## 2018-07-24 RX ADMIN — OXYCODONE HYDROCHLORIDE 10 MILLIGRAM(S): 5 TABLET ORAL at 08:29

## 2018-07-24 RX ADMIN — Medication 1 TABLET(S): at 17:27

## 2018-07-24 RX ADMIN — GABAPENTIN 600 MILLIGRAM(S): 400 CAPSULE ORAL at 06:17

## 2018-07-24 RX ADMIN — Medication 81 MILLIGRAM(S): at 11:42

## 2018-07-24 RX ADMIN — Medication 0.5 MILLIGRAM(S): at 21:38

## 2018-07-24 RX ADMIN — Medication 100 MILLIGRAM(S): at 21:38

## 2018-07-24 RX ADMIN — Medication 0.5 MILLIGRAM(S): at 08:05

## 2018-07-24 RX ADMIN — Medication 10 UNIT(S): at 12:52

## 2018-07-24 RX ADMIN — INSULIN GLARGINE 15 UNIT(S): 100 INJECTION, SOLUTION SUBCUTANEOUS at 09:12

## 2018-07-24 RX ADMIN — Medication 1 PATCH: at 08:18

## 2018-07-24 RX ADMIN — LISINOPRIL 2.5 MILLIGRAM(S): 2.5 TABLET ORAL at 06:17

## 2018-07-24 RX ADMIN — OXYCODONE HYDROCHLORIDE 10 MILLIGRAM(S): 5 TABLET ORAL at 19:50

## 2018-07-24 RX ADMIN — Medication 0.5 MILLIGRAM(S): at 13:57

## 2018-07-24 RX ADMIN — Medication 1 PATCH: at 08:15

## 2018-07-24 RX ADMIN — Medication 0.5 MILLIGRAM(S): at 19:55

## 2018-07-24 RX ADMIN — ATORVASTATIN CALCIUM 20 MILLIGRAM(S): 80 TABLET, FILM COATED ORAL at 21:38

## 2018-07-24 RX ADMIN — INSULIN GLARGINE 30 UNIT(S): 100 INJECTION, SOLUTION SUBCUTANEOUS at 21:29

## 2018-07-24 RX ADMIN — GABAPENTIN 600 MILLIGRAM(S): 400 CAPSULE ORAL at 17:27

## 2018-07-24 RX ADMIN — Medication 6: at 08:15

## 2018-07-24 NOTE — PROGRESS NOTE ADULT - PROBLEM SELECTOR PLAN 1
on iv Rocephin , will repeat ct renal with iv contrast ,   patient questioned , and claims that she had ct abdomen and pelvis with iv and po contrast at [facility] 1 yr ago , with out any untoward sequale , and no allergic reaction , [facility] called and ct obtained ,

## 2018-07-25 LAB
ANA TITR SER: NEGATIVE — SIGNIFICANT CHANGE UP
CREAT ?TM UR-MCNC: 61 MG/DL — SIGNIFICANT CHANGE UP
PROT ?TM UR-MCNC: 114 MG/DL — HIGH (ref 0–12)
PROT/CREAT UR-RTO: 1.9 RATIO — HIGH (ref 0–0.2)

## 2018-07-25 PROCEDURE — 99232 SBSQ HOSP IP/OBS MODERATE 35: CPT

## 2018-07-25 RX ORDER — INSULIN GLARGINE 100 [IU]/ML
30 INJECTION, SOLUTION SUBCUTANEOUS
Refills: 0 | Status: DISCONTINUED | OUTPATIENT
Start: 2018-07-25 | End: 2018-07-26

## 2018-07-25 RX ADMIN — Medication 3 MILLILITER(S): at 07:55

## 2018-07-25 RX ADMIN — Medication 650 MILLIGRAM(S): at 21:21

## 2018-07-25 RX ADMIN — INSULIN GLARGINE 30 UNIT(S): 100 INJECTION, SOLUTION SUBCUTANEOUS at 22:54

## 2018-07-25 RX ADMIN — Medication 0.5 MILLIGRAM(S): at 07:56

## 2018-07-25 RX ADMIN — OXYCODONE HYDROCHLORIDE 10 MILLIGRAM(S): 5 TABLET ORAL at 21:18

## 2018-07-25 RX ADMIN — Medication 10 UNIT(S): at 12:19

## 2018-07-25 RX ADMIN — OXYCODONE HYDROCHLORIDE 10 MILLIGRAM(S): 5 TABLET ORAL at 13:05

## 2018-07-25 RX ADMIN — Medication 100 MILLIGRAM(S): at 22:53

## 2018-07-25 RX ADMIN — Medication 20 MILLIGRAM(S): at 06:39

## 2018-07-25 RX ADMIN — Medication 0.5 MILLIGRAM(S): at 13:20

## 2018-07-25 RX ADMIN — Medication 1 TABLET(S): at 17:23

## 2018-07-25 RX ADMIN — Medication 50 MICROGRAM(S): at 06:39

## 2018-07-25 RX ADMIN — OXYCODONE HYDROCHLORIDE 10 MILLIGRAM(S): 5 TABLET ORAL at 13:35

## 2018-07-25 RX ADMIN — OXYCODONE HYDROCHLORIDE 10 MILLIGRAM(S): 5 TABLET ORAL at 23:13

## 2018-07-25 RX ADMIN — Medication 1 PATCH: at 11:24

## 2018-07-25 RX ADMIN — ENOXAPARIN SODIUM 40 MILLIGRAM(S): 100 INJECTION SUBCUTANEOUS at 11:38

## 2018-07-25 RX ADMIN — Medication 0.5 MILLIGRAM(S): at 06:38

## 2018-07-25 RX ADMIN — Medication 81 MILLIGRAM(S): at 11:41

## 2018-07-25 RX ADMIN — Medication 10 UNIT(S): at 08:07

## 2018-07-25 RX ADMIN — LISINOPRIL 2.5 MILLIGRAM(S): 2.5 TABLET ORAL at 06:39

## 2018-07-25 RX ADMIN — Medication 6: at 12:19

## 2018-07-25 RX ADMIN — INSULIN GLARGINE 15 UNIT(S): 100 INJECTION, SOLUTION SUBCUTANEOUS at 08:06

## 2018-07-25 RX ADMIN — GABAPENTIN 600 MILLIGRAM(S): 400 CAPSULE ORAL at 17:23

## 2018-07-25 RX ADMIN — GABAPENTIN 600 MILLIGRAM(S): 400 CAPSULE ORAL at 06:39

## 2018-07-25 RX ADMIN — FAMOTIDINE 20 MILLIGRAM(S): 10 INJECTION INTRAVENOUS at 17:28

## 2018-07-25 RX ADMIN — MIRTAZAPINE 30 MILLIGRAM(S): 45 TABLET, ORALLY DISINTEGRATING ORAL at 22:53

## 2018-07-25 RX ADMIN — CITALOPRAM 20 MILLIGRAM(S): 10 TABLET, FILM COATED ORAL at 22:53

## 2018-07-25 RX ADMIN — ATORVASTATIN CALCIUM 20 MILLIGRAM(S): 80 TABLET, FILM COATED ORAL at 22:53

## 2018-07-25 RX ADMIN — Medication 1 TABLET(S): at 08:09

## 2018-07-25 RX ADMIN — Medication 10 UNIT(S): at 17:24

## 2018-07-25 RX ADMIN — Medication 20 MILLIGRAM(S): at 17:23

## 2018-07-25 RX ADMIN — FAMOTIDINE 20 MILLIGRAM(S): 10 INJECTION INTRAVENOUS at 06:39

## 2018-07-25 RX ADMIN — Medication 650 MILLIGRAM(S): at 23:13

## 2018-07-25 RX ADMIN — Medication 0.5 MILLIGRAM(S): at 22:54

## 2018-07-25 RX ADMIN — Medication 0.5 MILLIGRAM(S): at 19:59

## 2018-07-25 RX ADMIN — Medication 10: at 08:07

## 2018-07-25 RX ADMIN — Medication 1 PATCH: at 11:37

## 2018-07-25 RX ADMIN — Medication 3 MILLILITER(S): at 20:00

## 2018-07-25 NOTE — PROGRESS NOTE ADULT - PROBLEM SELECTOR PLAN 1
on iv Rocephin , will repeat ct renal with iv contrast ,   patient questioned , and claims that she had ct abdomen and pelvis with iv and po contrast at [facility] radiology 1 yr ago , with out any untoward sequale , and no allergic reaction , [facility] called and ct obtained ,

## 2018-07-26 ENCOUNTER — INPATIENT (INPATIENT)
Facility: HOSPITAL | Age: 48
LOS: 18 days | Discharge: HOME | DRG: 883 | End: 2018-08-14
Attending: PSYCHIATRY & NEUROLOGY | Admitting: PSYCHIATRY & NEUROLOGY
Payer: MEDICARE

## 2018-07-26 VITALS
SYSTOLIC BLOOD PRESSURE: 130 MMHG | OXYGEN SATURATION: 98 % | DIASTOLIC BLOOD PRESSURE: 79 MMHG | TEMPERATURE: 99 F | HEART RATE: 62 BPM | RESPIRATION RATE: 16 BRPM

## 2018-07-26 DIAGNOSIS — F60.9 PERSONALITY DISORDER, UNSPECIFIED: ICD-10-CM

## 2018-07-26 DIAGNOSIS — F06.30 MOOD DISORDER DUE TO KNOWN PHYSIOLOGICAL CONDITION, UNSPECIFIED: ICD-10-CM

## 2018-07-26 DIAGNOSIS — Q76.1 KLIPPEL-FEIL SYNDROME: Chronic | ICD-10-CM

## 2018-07-26 DIAGNOSIS — F43.20 ADJUSTMENT DISORDER, UNSPECIFIED: ICD-10-CM

## 2018-07-26 DIAGNOSIS — F33.2 MAJOR DEPRESSIVE DISORDER, RECURRENT SEVERE WITHOUT PSYCHOTIC FEATURES: ICD-10-CM

## 2018-07-26 LAB
GLUCOSE BLDC GLUCOMTR-MCNC: 163 MG/DL — HIGH (ref 70–99)
GLUCOSE BLDC GLUCOMTR-MCNC: 210 MG/DL — HIGH (ref 70–99)
GLUCOSE BLDC GLUCOMTR-MCNC: 276 MG/DL — HIGH (ref 70–99)

## 2018-07-26 PROCEDURE — 90792 PSYCH DIAG EVAL W/MED SRVCS: CPT

## 2018-07-26 RX ORDER — DEXTROSE 50 % IN WATER 50 %
12.5 SYRINGE (ML) INTRAVENOUS ONCE
Refills: 0 | Status: DISCONTINUED | OUTPATIENT
Start: 2018-07-26 | End: 2018-08-14

## 2018-07-26 RX ORDER — ASPIRIN/CALCIUM CARB/MAGNESIUM 324 MG
81 TABLET ORAL DAILY
Refills: 0 | Status: DISCONTINUED | OUTPATIENT
Start: 2018-07-26 | End: 2018-08-14

## 2018-07-26 RX ORDER — DEXTROSE 50 % IN WATER 50 %
25 SYRINGE (ML) INTRAVENOUS ONCE
Refills: 0 | Status: DISCONTINUED | OUTPATIENT
Start: 2018-07-26 | End: 2018-08-14

## 2018-07-26 RX ORDER — OXYCODONE HYDROCHLORIDE 5 MG/1
15 TABLET ORAL EVERY 12 HOURS
Refills: 0 | Status: DISCONTINUED | OUTPATIENT
Start: 2018-07-26 | End: 2018-07-26

## 2018-07-26 RX ORDER — FAMOTIDINE 10 MG/ML
20 INJECTION INTRAVENOUS
Refills: 0 | Status: DISCONTINUED | OUTPATIENT
Start: 2018-07-26 | End: 2018-08-14

## 2018-07-26 RX ORDER — LISINOPRIL 2.5 MG/1
2.5 TABLET ORAL DAILY
Refills: 0 | Status: DISCONTINUED | OUTPATIENT
Start: 2018-07-26 | End: 2018-08-14

## 2018-07-26 RX ORDER — IPRATROPIUM/ALBUTEROL SULFATE 18-103MCG
3 AEROSOL WITH ADAPTER (GRAM) INHALATION ONCE
Refills: 0 | Status: DISCONTINUED | OUTPATIENT
Start: 2018-07-26 | End: 2018-08-14

## 2018-07-26 RX ORDER — BUDESONIDE, MICRONIZED 100 %
0.5 POWDER (GRAM) MISCELLANEOUS
Refills: 0 | Status: DISCONTINUED | OUTPATIENT
Start: 2018-07-26 | End: 2018-07-26

## 2018-07-26 RX ORDER — TRAZODONE HCL 50 MG
100 TABLET ORAL AT BEDTIME
Refills: 0 | Status: DISCONTINUED | OUTPATIENT
Start: 2018-07-26 | End: 2018-08-14

## 2018-07-26 RX ORDER — INSULIN LISPRO 100/ML
VIAL (ML) SUBCUTANEOUS
Refills: 0 | Status: DISCONTINUED | OUTPATIENT
Start: 2018-07-26 | End: 2018-08-14

## 2018-07-26 RX ORDER — GLUCAGON INJECTION, SOLUTION 0.5 MG/.1ML
1 INJECTION, SOLUTION SUBCUTANEOUS ONCE
Refills: 0 | Status: DISCONTINUED | OUTPATIENT
Start: 2018-07-26 | End: 2018-08-14

## 2018-07-26 RX ORDER — ATORVASTATIN CALCIUM 80 MG/1
20 TABLET, FILM COATED ORAL AT BEDTIME
Refills: 0 | Status: DISCONTINUED | OUTPATIENT
Start: 2018-07-26 | End: 2018-08-14

## 2018-07-26 RX ORDER — BUDESONIDE AND FORMOTEROL FUMARATE DIHYDRATE 160; 4.5 UG/1; UG/1
2 AEROSOL RESPIRATORY (INHALATION)
Refills: 0 | Status: DISCONTINUED | OUTPATIENT
Start: 2018-07-26 | End: 2018-08-14

## 2018-07-26 RX ORDER — CITALOPRAM 10 MG/1
20 TABLET, FILM COATED ORAL DAILY
Refills: 0 | Status: DISCONTINUED | OUTPATIENT
Start: 2018-07-26 | End: 2018-07-27

## 2018-07-26 RX ORDER — OXYCODONE HYDROCHLORIDE 5 MG/1
15 TABLET ORAL THREE TIMES A DAY
Refills: 0 | Status: DISCONTINUED | OUTPATIENT
Start: 2018-07-26 | End: 2018-08-02

## 2018-07-26 RX ORDER — MONTELUKAST 4 MG/1
10 TABLET, CHEWABLE ORAL DAILY
Refills: 0 | Status: DISCONTINUED | OUTPATIENT
Start: 2018-07-26 | End: 2018-08-14

## 2018-07-26 RX ORDER — DEXTROSE 50 % IN WATER 50 %
15 SYRINGE (ML) INTRAVENOUS ONCE
Refills: 0 | Status: DISCONTINUED | OUTPATIENT
Start: 2018-07-26 | End: 2018-08-14

## 2018-07-26 RX ORDER — INSULIN LISPRO 100/ML
10 VIAL (ML) SUBCUTANEOUS
Refills: 0 | Status: DISCONTINUED | OUTPATIENT
Start: 2018-07-26 | End: 2018-08-09

## 2018-07-26 RX ORDER — LEVOTHYROXINE SODIUM 125 MCG
50 TABLET ORAL DAILY
Refills: 0 | Status: DISCONTINUED | OUTPATIENT
Start: 2018-07-26 | End: 2018-08-14

## 2018-07-26 RX ORDER — CLONAZEPAM 1 MG
0.5 TABLET ORAL THREE TIMES A DAY
Refills: 0 | Status: DISCONTINUED | OUTPATIENT
Start: 2018-07-26 | End: 2018-08-01

## 2018-07-26 RX ORDER — GABAPENTIN 400 MG/1
600 CAPSULE ORAL
Refills: 0 | Status: DISCONTINUED | OUTPATIENT
Start: 2018-07-26 | End: 2018-08-02

## 2018-07-26 RX ORDER — SODIUM CHLORIDE 9 MG/ML
1000 INJECTION, SOLUTION INTRAVENOUS
Refills: 0 | Status: DISCONTINUED | OUTPATIENT
Start: 2018-07-26 | End: 2018-08-14

## 2018-07-26 RX ORDER — FUROSEMIDE 40 MG
20 TABLET ORAL
Refills: 0 | Status: DISCONTINUED | OUTPATIENT
Start: 2018-07-26 | End: 2018-08-14

## 2018-07-26 RX ORDER — NICOTINE POLACRILEX 2 MG
1 GUM BUCCAL DAILY
Refills: 0 | Status: DISCONTINUED | OUTPATIENT
Start: 2018-07-26 | End: 2018-08-14

## 2018-07-26 RX ORDER — INSULIN GLARGINE 100 [IU]/ML
30 INJECTION, SOLUTION SUBCUTANEOUS
Refills: 0 | Status: DISCONTINUED | OUTPATIENT
Start: 2018-07-26 | End: 2018-08-10

## 2018-07-26 RX ORDER — MIRTAZAPINE 45 MG/1
30 TABLET, ORALLY DISINTEGRATING ORAL AT BEDTIME
Refills: 0 | Status: DISCONTINUED | OUTPATIENT
Start: 2018-07-26 | End: 2018-08-14

## 2018-07-26 RX ADMIN — Medication 4: at 17:08

## 2018-07-26 RX ADMIN — Medication 50 MICROGRAM(S): at 06:03

## 2018-07-26 RX ADMIN — Medication 0.5 MILLIGRAM(S): at 12:48

## 2018-07-26 RX ADMIN — BUDESONIDE AND FORMOTEROL FUMARATE DIHYDRATE 2 PUFF(S): 160; 4.5 AEROSOL RESPIRATORY (INHALATION) at 18:13

## 2018-07-26 RX ADMIN — GABAPENTIN 600 MILLIGRAM(S): 400 CAPSULE ORAL at 06:04

## 2018-07-26 RX ADMIN — Medication 8: at 08:20

## 2018-07-26 RX ADMIN — OXYCODONE HYDROCHLORIDE 15 MILLIGRAM(S): 5 TABLET ORAL at 13:39

## 2018-07-26 RX ADMIN — Medication 100 MILLIGRAM(S): at 20:48

## 2018-07-26 RX ADMIN — Medication 6: at 12:50

## 2018-07-26 RX ADMIN — FAMOTIDINE 20 MILLIGRAM(S): 10 INJECTION INTRAVENOUS at 08:24

## 2018-07-26 RX ADMIN — Medication 10 UNIT(S): at 12:50

## 2018-07-26 RX ADMIN — ATORVASTATIN CALCIUM 20 MILLIGRAM(S): 80 TABLET, FILM COATED ORAL at 21:09

## 2018-07-26 RX ADMIN — Medication 20 MILLIGRAM(S): at 21:09

## 2018-07-26 RX ADMIN — OXYCODONE HYDROCHLORIDE 15 MILLIGRAM(S): 5 TABLET ORAL at 20:47

## 2018-07-26 RX ADMIN — Medication 20 MILLIGRAM(S): at 06:03

## 2018-07-26 RX ADMIN — FAMOTIDINE 20 MILLIGRAM(S): 10 INJECTION INTRAVENOUS at 21:09

## 2018-07-26 RX ADMIN — Medication 0.5 MILLIGRAM(S): at 07:52

## 2018-07-26 RX ADMIN — OXYCODONE HYDROCHLORIDE 15 MILLIGRAM(S): 5 TABLET ORAL at 21:41

## 2018-07-26 RX ADMIN — Medication 10 UNIT(S): at 17:09

## 2018-07-26 RX ADMIN — MIRTAZAPINE 30 MILLIGRAM(S): 45 TABLET, ORALLY DISINTEGRATING ORAL at 20:47

## 2018-07-26 RX ADMIN — LISINOPRIL 2.5 MILLIGRAM(S): 2.5 TABLET ORAL at 06:03

## 2018-07-26 RX ADMIN — Medication 10 UNIT(S): at 08:19

## 2018-07-26 RX ADMIN — Medication 0.5 MILLIGRAM(S): at 21:09

## 2018-07-26 RX ADMIN — GABAPENTIN 600 MILLIGRAM(S): 400 CAPSULE ORAL at 20:45

## 2018-07-26 RX ADMIN — Medication 3 MILLILITER(S): at 07:54

## 2018-07-26 RX ADMIN — INSULIN GLARGINE 30 UNIT(S): 100 INJECTION, SOLUTION SUBCUTANEOUS at 08:19

## 2018-07-26 RX ADMIN — INSULIN GLARGINE 30 UNIT(S): 100 INJECTION, SOLUTION SUBCUTANEOUS at 21:02

## 2018-07-26 RX ADMIN — OXYCODONE HYDROCHLORIDE 15 MILLIGRAM(S): 5 TABLET ORAL at 15:30

## 2018-07-26 RX ADMIN — Medication 0.5 MILLIGRAM(S): at 06:03

## 2018-07-26 RX ADMIN — OXYCODONE HYDROCHLORIDE 10 MILLIGRAM(S): 5 TABLET ORAL at 06:09

## 2018-07-26 RX ADMIN — OXYCODONE HYDROCHLORIDE 10 MILLIGRAM(S): 5 TABLET ORAL at 08:14

## 2018-07-26 NOTE — PROGRESS NOTE ADULT - PROBLEM SELECTOR PROBLEM 4
Spinal stenosis at L4-L5 level

## 2018-07-26 NOTE — PROGRESS NOTE ADULT - PROBLEM SELECTOR PROBLEM 6
Poor compliance with medication

## 2018-07-26 NOTE — PROGRESS NOTE ADULT - PROVIDER SPECIALTY LIST ADULT
Internal Medicine
Nephrology
Psychiatry
Psychiatry
Internal Medicine
Nephrology
Internal Medicine

## 2018-07-26 NOTE — PROGRESS NOTE BEHAVIORAL HEALTH - NSBHFUPADDHPIFT_PSY_A_CORE
patient see by Dr Gomez on 7/23/18 at [facility] when she was admitted to medicine for abdominal pain , nausea , weakness , lethargy and poor po intake , anemia , elevated lactate high , + UA and, elevated glucose, rule out pyelonephritis.  Patient reported worsening mood over the last several weeks, feeling alone, as the family support is limited, including worsened sleep, worsened interest, poor energy and concentration, worsened appetite. Patient presented with somewhat limited insight into a possible drug dependency issue, and at this time is considering inpatient admission due to persistent depression. Seen again on 7/25/18 during which time she agreed to a voluntary psychiatric admission
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
## 2018-07-26 NOTE — PROGRESS NOTE ADULT - PROBLEM SELECTOR PLAN 1
on iv Rocephin , will repeat ct renal with iv contrast ,   patient questioned , and claims that she had ct abdomen and pelvis with iv and po contrast at [facility] radiology 1 yr ago , with out any untoward sequale , and no allergic reaction , [facility] called and ct obtained ,

## 2018-07-26 NOTE — PROGRESS NOTE ADULT - PROBLEM SELECTOR PLAN 5
multiple psych meds , psych consult

## 2018-07-26 NOTE — PROGRESS NOTE ADULT - PROBLEM SELECTOR PLAN 8
multiple meds and psych eval
multiple meds and psych eval, plan psych placement
multiple meds and psych eval
multiple meds and psych eval

## 2018-07-26 NOTE — PROGRESS NOTE ADULT - PROBLEM SELECTOR PROBLEM 10
Current moderate episode of major depressive disorder, unspecified whether recurrent

## 2018-07-26 NOTE — PROGRESS NOTE ADULT - PROBLEM SELECTOR PROBLEM 5
Post traumatic stress disorder

## 2018-07-26 NOTE — PROGRESS NOTE ADULT - PROBLEM SELECTOR PROBLEM 8
Moderate episode of recurrent major depressive disorder

## 2018-07-26 NOTE — PROGRESS NOTE ADULT - PROBLEM SELECTOR PLAN 10
citalopram, psych eval , and admit to psych
citalopram, psych eval , and admit to psych
citalopram
citalopram, psych eval , and admit to psych

## 2018-07-26 NOTE — PROGRESS NOTE BEHAVIORAL HEALTH - RISK ASSESSMENT
currently at elevated risk given inablity to contract for safety, reports feeling unsafe at home, chronic medical issues, single with limited social supports currently at elevated risk given inablity to contract for safety, reports feeling unsafe at home, chronic medical issues, single with limited social supports, hx of suicide attempts, and substance abuse

## 2018-07-26 NOTE — PROGRESS NOTE ADULT - SUBJECTIVE AND OBJECTIVE BOX
47yFemale admitted to med/surg with following history:  HPI:  this is a 45 yo female , single no kids , with hx of chronic pain syndrome on multiple pain meds by pain management , , and neuropathy and diabetes and poor control and poor compliance diabetes and severe s recurrent major depression and anxiety disorder who follows with psych  and hx of multiple c spine , ls spine and abdominal surgeries and carpal tunnel syndrome and gyn surgeries for hysterectomy  , and recent mrsa post abdominal gyn surgery requiring home iv abx for 6-8 weeks , hx of laminectomy and hardware in ls spine and c spine , obesity , poor compliance with diabetes , hx of psych admission , p  patient presented to office yesterday and complain of abdominal pain , nausea , weakness , lethargy and poor po intake ,   in er anemia , lactae high , ua positive , sugar high and ct abdomen with ? pyelonephritis ,   admit control blood sugar and dm , educate , IV abx for ? kidney infection , probiotics , pain control , (20 Jul 2018 07:46)      Psych HPI: Patient seen, evaluated and chart reviewed. Patient continues to complain of poor mood and is ambivalent about her future. She continues to insist that she is unsafe at home and continues to request inpatient psychiatric admission.     PAST MEDICAL & SURGICAL HISTORY:  High cholesterol  Post traumatic stress disorder  Depression  Diabetes  Cervical fusion syndrome      Allergies    apple (Unknown)  No Known Drug Allergies  Peaches (Unknown)  raw fruits and vetables (Unknown)    Intolerances      MEDICATIONS  (STANDING):  ALBUTerol/ipratropium for Nebulization 3 milliLiter(s) Nebulizer three times a day  aspirin enteric coated 81 milliGRAM(s) Oral daily  atorvastatin 20 milliGRAM(s) Oral at bedtime  buDESOnide   0.5 milliGRAM(s) Respule 0.5 milliGRAM(s) Inhalation two times a day  citalopram 20 milliGRAM(s) Oral daily  clonazePAM Tablet 0.5 milliGRAM(s) Oral three times a day  dextrose 5%. 1000 milliLiter(s) (50 mL/Hr) IV Continuous <Continuous>  dextrose 5%. 1000 milliLiter(s) (50 mL/Hr) IV Continuous <Continuous>  dextrose 50% Injectable 12.5 Gram(s) IV Push once  dextrose 50% Injectable 25 Gram(s) IV Push once  dextrose 50% Injectable 25 Gram(s) IV Push once  dextrose 50% Injectable 12.5 Gram(s) IV Push once  dextrose 50% Injectable 25 Gram(s) IV Push once  dextrose 50% Injectable 25 Gram(s) IV Push once  enoxaparin Injectable 40 milliGRAM(s) SubCutaneous daily  famotidine    Tablet 20 milliGRAM(s) Oral two times a day  furosemide    Tablet 20 milliGRAM(s) Oral two times a day  gabapentin 600 milliGRAM(s) Oral two times a day  insulin glargine Injectable (LANTUS) 30 Unit(s) SubCutaneous two times a day  insulin lispro (HumaLOG) corrective regimen sliding scale   SubCutaneous three times a day before meals  insulin lispro Injectable (HumaLOG) 10 Unit(s) SubCutaneous before breakfast  insulin lispro Injectable (HumaLOG) 10 Unit(s) SubCutaneous before lunch  insulin lispro Injectable (HumaLOG) 10 Unit(s) SubCutaneous before dinner  lactobacillus acidophilus 1 Tablet(s) Oral two times a day with meals  levothyroxine 50 MICROGram(s) Oral daily  lisinopril 2.5 milliGRAM(s) Oral daily  mirtazapine Soltab 30 milliGRAM(s) Oral daily  nicotine -  14 mG/24Hr(s) Patch 1 patch Transdermal daily  traZODone 100 milliGRAM(s) Oral at bedtime    MEDICATIONS  (PRN):  acetaminophen   Tablet. 650 milliGRAM(s) Oral every 6 hours PRN Mild Pain (1 - 3)  dextrose 40% Gel 15 Gram(s) Oral once PRN Blood Glucose LESS THAN 70 milliGRAM(s)/deciliter  dextrose 40% Gel 15 Gram(s) Oral once PRN Blood Glucose LESS THAN 70 milliGRAM(s)/deciliter  glucagon  Injectable 1 milliGRAM(s) IntraMuscular once PRN Glucose LESS THAN 70 milligrams/deciliter  glucagon  Injectable 1 milliGRAM(s) IntraMuscular once PRN Glucose LESS THAN 70 milligrams/deciliter  ondansetron Injectable 4 milliGRAM(s) IV Push every 6 hours PRN Nausea and/or Vomiting  oxyCODONE    IR 10 milliGRAM(s) Oral four times a day PRN Moderate Pain (4 - 6)        ROS: Psych: See HPI.  All other systems negative.                          9.6    12.27 )-----------( 232      ( 24 Jul 2018 10:07 )             29.9   24 Jul 2018 07:36    139    |  106    |  25     ----------------------------<  240    4.3     |  24     |  0.86     Ca    8.9        24 Jul 2018 07:36    TPro  7.4    /  Alb  3.1    /  TBili  0.4    /  DBili  x      /  AST  14     /  ALT  24     /  AlkPhos  103    24 Jul 2018 07:36    TSH:     Utox:  Imaging:  Other Tests:    Old Records reviewed:    EXAM:  Vital Signs Last 24 Hrs  T(C): 36.9 (07-25-18 @ 04:24), Max: 36.9 (07-24-18 @ 22:05)  T(F): 98.5 (07-25-18 @ 04:24), Max: 98.5 (07-24-18 @ 22:05)  HR: 66 (07-25-18 @ 07:58) (66 - 75)  BP: 148/84 (07-25-18 @ 04:24) (146/84 - 148/84)  BP(mean): --  RR: 18 (07-25-18 @ 04:24) (18 - 18)  SpO2: 99% (07-25-18 @ 07:58) (98% - 99%)  Gen Appearance: Fair grooming and hygiene   Gait/Station/Muscle Tone: WNL  Abnl Movements: Absent  Speech: Normoproductive, relevant  TP; WNL  Associations: WNL  TC: Patient is expressing passive suicidal ideation  Mood: Depressed, tearful  Affect: labile  Consciousness/orientation: WNL  Memory:   Recent: WNL   Remote: WNL  Attention/Concentration: WNL  Language: WNL  Fund of Knowledge: Average  Insight: Fair  Judgment: Fair    Suicide Risk Assessment: At this time patient is unable to contract for safety, and requests inpatient psychiatric admission      DX: Adjustment disorder with anxiety and depression vs MDD, opioid dependence    REC: Continue current treatment, will admit to an inpatient psychiatric unit, once medically stable  Observation Status: Regular  Follow-up: As needed
47yFemale admitted to med/surg with following history:  HPI:  this is a 47 yo female , single no kids , with hx of chronic pain syndrome on multiple pain meds by pain management , , and neuropathy and diabetes and poor control and poor compliance diabetes and severe s recurrent major depression and anxiety disorder who follows with psych  and hx of multiple c spine , ls spine and abdominal surgeries and carpal tunnel syndrome and gyn surgeries for hysterectomy  , and recent mrsa post abdominal gyn surgery requiring home iv abx for 6-8 weeks , hx of laminectomy and hardware in ls spine and c spine , obesity , poor compliance with diabetes , hx of psych admission , p  patient presented to office yesterday and complain of abdominal pain , nausea , weakness , lethargy and poor po intake ,   in er anemia , lactae high , ua positive , sugar high and ct abdomen with ? pyelonephritis ,   admit control blood sugar and dm , educate , IV abx for ? kidney infection , probiotics , pain control , (20 Jul 2018 07:46)      Psych HPI: Patient seen, evaluated and chart reviewed. Patient continues to present with tearful mood and inability to contract for safety. "I don't feel good, I don't know what is going to happen if I go home."    PAST MEDICAL & SURGICAL HISTORY:  High cholesterol  Post traumatic stress disorder  Depression  Diabetes  Cervical fusion syndrome      Allergies    apple (Unknown)  Drug Allergies Not Recorded  Peaches (Unknown)  raw fruits and vetables (Unknown)    Intolerances      MEDICATIONS  (STANDING):  ALBUTerol/ipratropium for Nebulization 3 milliLiter(s) Nebulizer three times a day  aspirin enteric coated 81 milliGRAM(s) Oral daily  atorvastatin 20 milliGRAM(s) Oral at bedtime  buDESOnide   0.5 milliGRAM(s) Respule 0.5 milliGRAM(s) Inhalation two times a day  cefTRIAXone   IVPB 1 Gram(s) IV Intermittent every 24 hours  citalopram 20 milliGRAM(s) Oral daily  clonazePAM Tablet 0.5 milliGRAM(s) Oral three times a day  dextrose 5%. 1000 milliLiter(s) (50 mL/Hr) IV Continuous <Continuous>  dextrose 5%. 1000 milliLiter(s) (50 mL/Hr) IV Continuous <Continuous>  dextrose 50% Injectable 12.5 Gram(s) IV Push once  dextrose 50% Injectable 25 Gram(s) IV Push once  dextrose 50% Injectable 25 Gram(s) IV Push once  dextrose 50% Injectable 12.5 Gram(s) IV Push once  dextrose 50% Injectable 25 Gram(s) IV Push once  dextrose 50% Injectable 25 Gram(s) IV Push once  enoxaparin Injectable 40 milliGRAM(s) SubCutaneous daily  famotidine    Tablet 20 milliGRAM(s) Oral two times a day  furosemide    Tablet 20 milliGRAM(s) Oral two times a day  gabapentin 600 milliGRAM(s) Oral two times a day  insulin glargine Injectable (LANTUS) 30 Unit(s) SubCutaneous at bedtime  insulin glargine Injectable (LANTUS) 15 Unit(s) SubCutaneous every morning  insulin lispro (HumaLOG) corrective regimen sliding scale   SubCutaneous three times a day before meals  insulin lispro Injectable (HumaLOG) 10 Unit(s) SubCutaneous before breakfast  insulin lispro Injectable (HumaLOG) 10 Unit(s) SubCutaneous before lunch  insulin lispro Injectable (HumaLOG) 10 Unit(s) SubCutaneous before dinner  lactobacillus acidophilus 1 Tablet(s) Oral two times a day with meals  levothyroxine 50 MICROGram(s) Oral daily  lisinopril 2.5 milliGRAM(s) Oral daily  mirtazapine Soltab 30 milliGRAM(s) Oral daily  nicotine -  14 mG/24Hr(s) Patch 1 patch Transdermal daily  traZODone 100 milliGRAM(s) Oral at bedtime    MEDICATIONS  (PRN):  acetaminophen   Tablet. 650 milliGRAM(s) Oral every 6 hours PRN Mild Pain (1 - 3)  dextrose 40% Gel 15 Gram(s) Oral once PRN Blood Glucose LESS THAN 70 milliGRAM(s)/deciliter  dextrose 40% Gel 15 Gram(s) Oral once PRN Blood Glucose LESS THAN 70 milliGRAM(s)/deciliter  glucagon  Injectable 1 milliGRAM(s) IntraMuscular once PRN Glucose LESS THAN 70 milligrams/deciliter  glucagon  Injectable 1 milliGRAM(s) IntraMuscular once PRN Glucose LESS THAN 70 milligrams/deciliter  ondansetron Injectable 4 milliGRAM(s) IV Push every 6 hours PRN Nausea and/or Vomiting  oxyCODONE    IR 10 milliGRAM(s) Oral four times a day PRN Moderate Pain (4 - 6)        ROS: Psych: See HPI.  All other systems negative.                          9.6    12.27 )-----------( 232      ( 24 Jul 2018 10:07 )             29.9   24 Jul 2018 07:36    139    |  106    |  25     ----------------------------<  240    4.3     |  24     |  0.86     Ca    8.9        24 Jul 2018 07:36    TPro  7.4    /  Alb  3.1    /  TBili  0.4    /  DBili  x      /  AST  14     /  ALT  24     /  AlkPhos  103    24 Jul 2018 07:36    TSH:     Utox:  Imaging:  Other Tests:    Old Records reviewed:    EXAM:  Vital Signs Last 24 Hrs  T(C): 36.9 (07-24-18 @ 05:14), Max: 37.1 (07-23-18 @ 20:35)  T(F): 98.5 (07-24-18 @ 05:14), Max: 98.7 (07-23-18 @ 20:35)  HR: 63 (07-24-18 @ 08:07) (63 - 74)  BP: 145/81 (07-24-18 @ 05:14) (129/64 - 164/83)  BP(mean): --  RR: 17 (07-24-18 @ 05:14) (17 - 17)  SpO2: 93% (07-24-18 @ 08:07) (93% - 100%)  Gen Appearance: Fair grooming and hygiene   Gait/Station/Muscle Tone: WNL  Abnl Movements: Absent  Speech: Normoproductive, relevant  TP; WNL  Associations: WNL  TC: Patient is expressing passive suicidal ideation  Mood: Depressed, tearful  Affect: labile  Consciousness/orientation: WNL  Memory:   Recent: WNL   Remote: WNL  Attention/Concentration: WNL  Language: WNL  Fund of Knowledge: Average  Insight: Fair  Judgment: Fair    Suicide Risk Assessment: At this time patient is unable to contract for safety, and requests inpatient psychiatric admission      DX: Adjustment disorder with anxiety and depression vs MDD, opioid dependence    REC: Continue current treatment, will admit to an inpatient psychiatric unit, once medically stable  Observation Status: Regular  Follow-up: As needed
PCP  Subjective:   complain of slight wheezing , worried about renal issues and had rt sided flank pain     Objective:   Vital Signs Last 24 Hrs  T(C): 36.7 (07-22-18 @ 04:52), Max: 36.7 (07-21-18 @ 14:05)  T(F): 98.1 (07-22-18 @ 04:52), Max: 98.1 (07-21-18 @ 14:05)  HR: 61 (07-22-18 @ 04:52) (61 - 77)  BP: 164/89 (07-22-18 @ 04:52) (161/84 - 169/78)  BP(mean): --  RR: 17 (07-22-18 @ 04:52) (17 - 17)  SpO2: 96% (07-22-18 @ 04:52) (96% - 97%)  Daily     Daily      Physical Exam:  · Constitutional	detailed exam	  · Constitutional Details	well-developed; well-groomed; well-nourished; obese	  · Constitutional Comments	weak looking	  · Eyes	EOMI; PERRL; no drainage or redness	  · ENMT	No oral lesions; no gross abnormalities	  · Neck	No bruits; no thyromegaly or nodules	  · Back	detailed exam	  · Back Comments	wears a back brace	  · Respiratory	Breath Sounds equal & clear to percussion & auscultation, no accessory muscle use	  · Cardiovascular	Regular rate & rhythm, normal S1, S2; no murmurs, gallops or rubs; no S3, S4	  · Gastrointestinal	detailed exam	  · GI Normal	no rebound tenderness	  · GI Abnormal	tender  distended	  · Tenderness	LUQ; RLQ	  · Extremities	detailed exam	  · Extremities Details	pedal edema	  · Pedal Edema Severity	1+	  · Pedal Edema Type	pitting	  · Vascular	Equal and normal pulses (carotid, femoral, dorsalis pedis)	  · Neurological	detailed exam awake , alert responsive , depressed , non focal , 	    Allergies: Allergies    apple (Unknown)  iodine (Unknown)  Peaches (Unknown)  raw fruits and vetables (Unknown)    Intolerances        Home Medications:  aspirin 81 mg oral delayed release tablet: 1 tab(s) orally once a day (21 Jul 2018 13:32)  citalopram:  (20 Jul 2018 01:11)  famotidine 20 mg oral tablet: 1 tab(s) orally 2 times a day (21 Jul 2018 13:32)  furosemide 20 mg oral tablet: 1 tab(s) orally 2 times a day (20 Jul 2018 01:11)  gabapentin 600 mg oral tablet: 1 tab(s) orally 2 times a day (20 Jul 2018 01:11)  insulin:  (20 Jul 2018 01:11)  insulin glargine:  (21 Jul 2018 13:32)  insulin lispro (concentrated) 200 units/mL subcutaneous solution:  subcutaneous  (21 Jul 2018 13:32)  KlonoPIN:  (20 Jul 2018 01:11)  lactobacillus acidophilus oral capsule:  orally  (21 Jul 2018 13:32)  Lipitor:  (20 Jul 2018 01:11)  lisinopril 2.5 mg oral tablet: 1 tab(s) orally once a day (20 Jul 2018 01:11)  mirtazapine 45 mg oral tablet, disintegrating:  (20 Jul 2018 01:11)  nicotine 14 mg/24 hr transdermal film, extended release:  transdermal  (21 Jul 2018 13:32)  oxyCODONE 15 mg oral tablet, extended release: 1 tab(s) orally 3 times a day (with meals) (20 Jul 2018 01:11)  Singulair 10 mg oral tablet:  (20 Jul 2018 01:11)  Synthroid 50 mcg (0.05 mg) oral tablet: 1 tab(s) orally once a day (20 Jul 2018 01:11)  traZODone 100 mg oral tablet: 200 milligram(s) orally once a day (at bedtime) (20 Jul 2018 01:11)  Ventolin:  (20 Jul 2018 01:11)    Medications:   ALBUTerol/ipratropium for Nebulization 3 milliLiter(s) Nebulizer three times a day  aspirin enteric coated 81 milliGRAM(s) Oral daily  atorvastatin 20 milliGRAM(s) Oral at bedtime  buDESOnide   0.5 milliGRAM(s) Respule 0.5 milliGRAM(s) Inhalation two times a day  cefTRIAXone   IVPB 1 Gram(s) IV Intermittent every 24 hours  citalopram 20 milliGRAM(s) Oral daily  clonazePAM Tablet 0.5 milliGRAM(s) Oral three times a day  dextrose 40% Gel 15 Gram(s) Oral once PRN  dextrose 40% Gel 15 Gram(s) Oral once PRN  dextrose 5%. 1000 milliLiter(s) IV Continuous <Continuous>  dextrose 5%. 1000 milliLiter(s) IV Continuous <Continuous>  dextrose 50% Injectable 12.5 Gram(s) IV Push once  dextrose 50% Injectable 25 Gram(s) IV Push once  dextrose 50% Injectable 25 Gram(s) IV Push once  dextrose 50% Injectable 12.5 Gram(s) IV Push once  dextrose 50% Injectable 25 Gram(s) IV Push once  dextrose 50% Injectable 25 Gram(s) IV Push once  enoxaparin Injectable 40 milliGRAM(s) SubCutaneous daily  famotidine    Tablet 20 milliGRAM(s) Oral two times a day  furosemide    Tablet 20 milliGRAM(s) Oral two times a day  gabapentin 600 milliGRAM(s) Oral two times a day  glucagon  Injectable 1 milliGRAM(s) IntraMuscular once PRN  glucagon  Injectable 1 milliGRAM(s) IntraMuscular once PRN  insulin glargine Injectable (LANTUS) 24 Unit(s) SubCutaneous at bedtime  insulin lispro (HumaLOG) corrective regimen sliding scale   SubCutaneous three times a day before meals  insulin lispro Injectable (HumaLOG) 10 Unit(s) SubCutaneous before breakfast  insulin lispro Injectable (HumaLOG) 10 Unit(s) SubCutaneous before lunch  insulin lispro Injectable (HumaLOG) 10 Unit(s) SubCutaneous before dinner  lactobacillus acidophilus 1 Tablet(s) Oral two times a day with meals  levothyroxine 50 MICROGram(s) Oral daily  lisinopril 2.5 milliGRAM(s) Oral daily  mirtazapine Soltab 30 milliGRAM(s) Oral daily  nicotine -  14 mG/24Hr(s) Patch 1 patch Transdermal daily  oxyCODONE    IR 10 milliGRAM(s) Oral four times a day PRN  traZODone 100 milliGRAM(s) Oral at bedtime      LABS:            07-19 @ 19:46  INR 0.97        CAPILLARY BLOOD GLUCOSE      POCT Blood Glucose.: 364 mg/dL (22 Jul 2018 08:09)  POCT Blood Glucose.: 187 mg/dL (21 Jul 2018 21:16)  POCT Blood Glucose.: 208 mg/dL (21 Jul 2018 17:17)  POCT Blood Glucose.: 389 mg/dL (21 Jul 2018 11:30)          RECENT CULTURES:  Culture Results:   <10,000 CFU/ml  Normal Urogenital elizabeth present (07-19 @ 23:20)  Culture Results:   No growth to date. (07-19 @ 21:16)  Culture Results:   No growth to date. (07-19 @ 21:16)        Culture - Urine (collected 07-19-18 @ 23:20)  Source: .Urine Clean Catch (Midstream)  Final Report (07-20-18 @ 23:25):    <10,000 CFU/ml    Normal Urogenital elizabeth present    Culture - Blood (collected 07-19-18 @ 21:16)  Source: .Blood Blood-Venous  Preliminary Report (07-20-18 @ 22:02):    No growth to date.    Culture - Blood (collected 07-19-18 @ 21:16)  Source: .Blood Blood-Peripheral  Preliminary Report (07-20-18 @ 22:02):    No growth to date.
Patient is a 47y old  Female who presents with a chief complaint of nausea , abdominal pain , not feeling well , weak x few days weeks (21 Jul 2018 13:29)      Patient seen in follow up for proteinuria.     PAST MEDICAL HISTORY:  High cholesterol  Post traumatic stress disorder  Depression  Diabetes    MEDICATIONS  (STANDING):  ALBUTerol/ipratropium for Nebulization 3 milliLiter(s) Nebulizer three times a day  aspirin enteric coated 81 milliGRAM(s) Oral daily  atorvastatin 20 milliGRAM(s) Oral at bedtime  buDESOnide   0.5 milliGRAM(s) Respule 0.5 milliGRAM(s) Inhalation two times a day  citalopram 20 milliGRAM(s) Oral daily  clonazePAM Tablet 0.5 milliGRAM(s) Oral three times a day  dextrose 5%. 1000 milliLiter(s) (50 mL/Hr) IV Continuous <Continuous>  dextrose 5%. 1000 milliLiter(s) (50 mL/Hr) IV Continuous <Continuous>  dextrose 50% Injectable 12.5 Gram(s) IV Push once  dextrose 50% Injectable 25 Gram(s) IV Push once  dextrose 50% Injectable 25 Gram(s) IV Push once  dextrose 50% Injectable 12.5 Gram(s) IV Push once  dextrose 50% Injectable 25 Gram(s) IV Push once  dextrose 50% Injectable 25 Gram(s) IV Push once  enoxaparin Injectable 40 milliGRAM(s) SubCutaneous daily  famotidine    Tablet 20 milliGRAM(s) Oral two times a day  furosemide    Tablet 20 milliGRAM(s) Oral two times a day  gabapentin 600 milliGRAM(s) Oral two times a day  insulin glargine Injectable (LANTUS) 30 Unit(s) SubCutaneous two times a day  insulin lispro (HumaLOG) corrective regimen sliding scale   SubCutaneous three times a day before meals  insulin lispro Injectable (HumaLOG) 10 Unit(s) SubCutaneous before breakfast  insulin lispro Injectable (HumaLOG) 10 Unit(s) SubCutaneous before lunch  insulin lispro Injectable (HumaLOG) 10 Unit(s) SubCutaneous before dinner  lactobacillus acidophilus 1 Tablet(s) Oral two times a day with meals  levothyroxine 50 MICROGram(s) Oral daily  lisinopril 2.5 milliGRAM(s) Oral daily  mirtazapine Soltab 30 milliGRAM(s) Oral daily  nicotine -  14 mG/24Hr(s) Patch 1 patch Transdermal daily  traZODone 100 milliGRAM(s) Oral at bedtime    MEDICATIONS  (PRN):  acetaminophen   Tablet. 650 milliGRAM(s) Oral every 6 hours PRN Mild Pain (1 - 3)  dextrose 40% Gel 15 Gram(s) Oral once PRN Blood Glucose LESS THAN 70 milliGRAM(s)/deciliter  dextrose 40% Gel 15 Gram(s) Oral once PRN Blood Glucose LESS THAN 70 milliGRAM(s)/deciliter  glucagon  Injectable 1 milliGRAM(s) IntraMuscular once PRN Glucose LESS THAN 70 milligrams/deciliter  glucagon  Injectable 1 milliGRAM(s) IntraMuscular once PRN Glucose LESS THAN 70 milligrams/deciliter  ondansetron Injectable 4 milliGRAM(s) IV Push every 6 hours PRN Nausea and/or Vomiting  oxyCODONE    IR 10 milliGRAM(s) Oral four times a day PRN Moderate Pain (4 - 6)    T(C): 36.9 (07-25-18 @ 04:24), Max: 37.1 (07-24-18 @ 13:59)  HR: 66 (07-25-18 @ 07:58) (63 - 75)  BP: 148/84 (07-25-18 @ 04:24) (138/65 - 148/84)  RR: 18 (07-25-18 @ 04:24) (17 - 18)  SpO2: 99% (07-25-18 @ 07:58) (93% - 99%)  Wt(kg): --  I&O's Detail      PHYSICAL EXAM:  General: NAD  Respiratory: b/l air entry  Cardiovascular: S1 S2  Gastrointestinal: soft  Extremities:  no edema                          9.6    12.27 )-----------( 232      ( 24 Jul 2018 10:07 )             29.9     07-24    139  |  106  |  25<H>  ----------------------------<  240<H>  4.3   |  24  |  0.86    Ca    8.9      24 Jul 2018 07:36    TPro  7.4  /  Alb  3.1<L>  /  TBili  0.4  /  DBili  x   /  AST  14<L>  /  ALT  24  /  AlkPhos  103  07-24        LIVER FUNCTIONS - ( 24 Jul 2018 07:36 )  Alb: 3.1 g/dL / Pro: 7.4 g/dL / ALK PHOS: 103 U/L / ALT: 24 U/L / AST: 14 U/L / GGT: x           Protein/Creatinine Ratio Calculation: 1.9 Ratio (07.24.18 @ 22:02)      Sodium, Serum: 139 (07-24 @ 07:36)  Sodium, Serum: 138 (07-23 @ 07:06)    Creatinine, Serum: 0.86 (07-24 @ 07:36)  Creatinine, Serum: 0.99 (07-23 @ 07:06)    Potassium, Serum: 4.3 (07-24 @ 07:36)  Potassium, Serum: 4.2 (07-23 @ 07:06)    Hemoglobin: 9.6 (07-24 @ 10:07)  Hemoglobin: 9.6 (07-23 @ 07:06)
OBED NEWELL  47y  Female    Patient is a 47y old  Female who presents with a chief complaint of nausea , abdominal pain , not feeling well , weak x few days weeks (21 Jul 2018 13:29)    feels better today.   denies any chest pain or shortness of breath.     PAST MEDICAL & SURGICAL HISTORY:  High cholesterol  Post traumatic stress disorder  Depression  Diabetes  Cervical fusion syndrome    PHYSICAL EXAM:    T(C): 36.9 (07-24-18 @ 05:14), Max: 37.1 (07-23-18 @ 20:35)  HR: 63 (07-24-18 @ 08:07) (60 - 74)  BP: 145/81 (07-24-18 @ 05:14) (129/64 - 164/83)  RR: 17 (07-24-18 @ 05:14) (17 - 18)  SpO2: 93% (07-24-18 @ 08:07) (93% - 100%)  Wt(kg): --    I&O's Detail    23 Jul 2018 07:01  -  24 Jul 2018 07:00  --------------------------------------------------------  IN:    Solution: 50 mL  Total IN: 50 mL    OUT:  Total OUT: 0 mL    Total NET: 50 mL    Respiratory: clear anteriorly, decreased BS at bases  Cardiovascular: S1 S2  Gastrointestinal: soft NT ND +BS  Extremities: edema   Neuro: Awake and alert    MEDICATIONS  (STANDING):  ALBUTerol/ipratropium for Nebulization 3 milliLiter(s) Nebulizer three times a day  aspirin enteric coated 81 milliGRAM(s) Oral daily  atorvastatin 20 milliGRAM(s) Oral at bedtime  buDESOnide   0.5 milliGRAM(s) Respule 0.5 milliGRAM(s) Inhalation two times a day  cefTRIAXone   IVPB 1 Gram(s) IV Intermittent every 24 hours  citalopram 20 milliGRAM(s) Oral daily  clonazePAM Tablet 0.5 milliGRAM(s) Oral three times a day  dextrose 5%. 1000 milliLiter(s) (50 mL/Hr) IV Continuous <Continuous>  dextrose 5%. 1000 milliLiter(s) (50 mL/Hr) IV Continuous <Continuous>  dextrose 50% Injectable 12.5 Gram(s) IV Push once  dextrose 50% Injectable 25 Gram(s) IV Push once  dextrose 50% Injectable 25 Gram(s) IV Push once  dextrose 50% Injectable 12.5 Gram(s) IV Push once  dextrose 50% Injectable 25 Gram(s) IV Push once  dextrose 50% Injectable 25 Gram(s) IV Push once  enoxaparin Injectable 40 milliGRAM(s) SubCutaneous daily  famotidine    Tablet 20 milliGRAM(s) Oral two times a day  furosemide    Tablet 20 milliGRAM(s) Oral two times a day  gabapentin 600 milliGRAM(s) Oral two times a day  insulin glargine Injectable (LANTUS) 30 Unit(s) SubCutaneous at bedtime  insulin glargine Injectable (LANTUS) 15 Unit(s) SubCutaneous every morning  insulin lispro (HumaLOG) corrective regimen sliding scale   SubCutaneous three times a day before meals  insulin lispro Injectable (HumaLOG) 10 Unit(s) SubCutaneous before breakfast  insulin lispro Injectable (HumaLOG) 10 Unit(s) SubCutaneous before lunch  insulin lispro Injectable (HumaLOG) 10 Unit(s) SubCutaneous before dinner  lactobacillus acidophilus 1 Tablet(s) Oral two times a day with meals  levothyroxine 50 MICROGram(s) Oral daily  lisinopril 2.5 milliGRAM(s) Oral daily  mirtazapine Soltab 30 milliGRAM(s) Oral daily  nicotine -  14 mG/24Hr(s) Patch 1 patch Transdermal daily  traZODone 100 milliGRAM(s) Oral at bedtime    MEDICATIONS  (PRN):  acetaminophen   Tablet. 650 milliGRAM(s) Oral every 6 hours PRN Mild Pain (1 - 3)  dextrose 40% Gel 15 Gram(s) Oral once PRN Blood Glucose LESS THAN 70 milliGRAM(s)/deciliter  dextrose 40% Gel 15 Gram(s) Oral once PRN Blood Glucose LESS THAN 70 milliGRAM(s)/deciliter  glucagon  Injectable 1 milliGRAM(s) IntraMuscular once PRN Glucose LESS THAN 70 milligrams/deciliter  glucagon  Injectable 1 milliGRAM(s) IntraMuscular once PRN Glucose LESS THAN 70 milligrams/deciliter  ondansetron Injectable 4 milliGRAM(s) IV Push every 6 hours PRN Nausea and/or Vomiting  oxyCODONE    IR 10 milliGRAM(s) Oral four times a day PRN Moderate Pain (4 - 6)                            9.6    12.27 )-----------( 232      ( 24 Jul 2018 10:07 )             29.9       07-24    139  |  106  |  25<H>  ----------------------------<  240<H>  4.3   |  24  |  0.86    Ca    8.9      24 Jul 2018 07:36    TPro  7.4  /  Alb  3.1<L>  /  TBili  0.4  /  DBili  x   /  AST  14<L>  /  ALT  24  /  AlkPhos  103  07-24
PCP  Subjective:   in bed , awake , alert     Objective:   Vital Signs Last 24 Hrs  T(C): 37.2 (18 @ 05:12), Max: 37.2 (18 @ 05:12)  T(F): 98.9 (18 @ 05:12), Max: 98.9 (18 @ 05:12)  HR: 58 (18 @ 05:12) (58 - 79)  BP: 155/77 (18 @ 05:12) (147/78 - 155/77)  BP(mean): --  RR: 18 (18 @ 05:12) (17 - 18)  SpO2: 93% (18 @ 05:12) (93% - 95%)  Daily     Daily Weight in k.1 (2018 13:34)     Physical Exam:  · Constitutional	detailed exam	  · Constitutional Details	well-developed; well-groomed; well-nourished; obese	  · Constitutional Comments	weak looking	  · Eyes	EOMI; PERRL; no drainage or redness	  · ENMT	No oral lesions; no gross abnormalities	  · Neck	No bruits; no thyromegaly or nodules	  · Back	detailed exam	  · Back Comments	wears a back brace	  · Respiratory	Breath Sounds equal & clear to percussion & auscultation, no accessory muscle use	  · Cardiovascular	Regular rate & rhythm, normal S1, S2; no murmurs, gallops or rubs; no S3, S4	  · Gastrointestinal	detailed exam	  · GI Normal	no rebound tenderness	  · GI Abnormal	tender  distended	  · Tenderness	LUQ; RLQ	  · Extremities	detailed exam	  · Extremities Details	pedal edema	  · Pedal Edema Severity	1+	  · Pedal Edema Type	pitting	  · Vascular	Equal and normal pulses (carotid, femoral, dorsalis pedis)	  · Neurological	detailed exam	      Allergies: Allergies    apple (Unknown)  iodine (Unknown)  Peaches (Unknown)  raw fruits and vetables (Unknown)    Intolerances        Home Medications:  citalopram:  (2018 01:11)  furosemide 20 mg oral tablet: 1 tab(s) orally 2 times a day (2018 01:11)  gabapentin 600 mg oral tablet: 1 tab(s) orally 2 times a day (2018 01:11)  insulin:  (2018 01:11)  KlonoPIN:  (2018 01:11)  Lipitor:  (:11)  lisinopril 2.5 mg oral tablet: 1 tab(s) orally once a day (:11)  mirtazapine 45 mg oral tablet, disintegrating:  (:11)  oxyCODONE 15 mg oral tablet, extended release: 1 tab(s) orally 3 times a day (with meals) (:11)  Singulair 10 mg oral tablet:  (:11)  Synthroid 50 mcg (0.05 mg) oral tablet: 1 tab(s) orally once a day (:11)  traZODone 100 mg oral tablet: 200 milligram(s) orally once a day (at bedtime) (:11)  Ventolin:  (:)    Medications:   aspirin enteric coated 81 milliGRAM(s) Oral daily  atorvastatin 20 milliGRAM(s) Oral at bedtime  cefTRIAXone   IVPB 1 Gram(s) IV Intermittent every 24 hours  citalopram 20 milliGRAM(s) Oral daily  clonazePAM Tablet 0.5 milliGRAM(s) Oral three times a day  dextrose 40% Gel 15 Gram(s) Oral once PRN  dextrose 40% Gel 15 Gram(s) Oral once PRN  dextrose 5%. 1000 milliLiter(s) IV Continuous <Continuous>  dextrose 5%. 1000 milliLiter(s) IV Continuous <Continuous>  dextrose 50% Injectable 12.5 Gram(s) IV Push once  dextrose 50% Injectable 25 Gram(s) IV Push once  dextrose 50% Injectable 25 Gram(s) IV Push once  dextrose 50% Injectable 12.5 Gram(s) IV Push once  dextrose 50% Injectable 25 Gram(s) IV Push once  dextrose 50% Injectable 25 Gram(s) IV Push once  enoxaparin Injectable 40 milliGRAM(s) SubCutaneous daily  famotidine    Tablet 20 milliGRAM(s) Oral two times a day  furosemide    Tablet 20 milliGRAM(s) Oral two times a day  gabapentin 600 milliGRAM(s) Oral two times a day  glucagon  Injectable 1 milliGRAM(s) IntraMuscular once PRN  glucagon  Injectable 1 milliGRAM(s) IntraMuscular once PRN  insulin glargine Injectable (LANTUS) 24 Unit(s) SubCutaneous at bedtime  insulin lispro (HumaLOG) corrective regimen sliding scale   SubCutaneous three times a day before meals  insulin lispro Injectable (HumaLOG) 10 Unit(s) SubCutaneous before breakfast  insulin lispro Injectable (HumaLOG) 10 Unit(s) SubCutaneous before lunch  insulin lispro Injectable (HumaLOG) 10 Unit(s) SubCutaneous before dinner  lactobacillus acidophilus 1 Tablet(s) Oral two times a day with meals  levothyroxine 50 MICROGram(s) Oral daily  lisinopril 2.5 milliGRAM(s) Oral daily  mirtazapine Soltab 30 milliGRAM(s) Oral daily  nicotine -  14 mG/24Hr(s) Patch 1 patch Transdermal daily  oxyCODONE    IR 10 milliGRAM(s) Oral four times a day PRN  traZODone 100 milliGRAM(s) Oral at bedtime      LABS:                        9.7    11.46 )-----------( 226      ( 2018 19:46 )             30.1         131<L>  |  97  |  16  ----------------------------<  404<H>  4.2   |  23  |  1.30    Ca    8.3<L>      2018 19:46    TPro  7.1  /  Alb  3.2<L>  /  TBili  0.4  /  DBili  x   /  AST  19  /  ALT  25  /  AlkPhos  108      PT/INR - ( 2018 19:46 )   PT: 10.6 sec;   INR: 0.97 ratio         PTT - ( 2018 19:46 )  PTT:29.9 sec   @ 19:46  INR 0.97    Urinalysis Basic - ( 2018 20:23 )    Color: Yellow / Appearance: Slightly Turbid / S.010 / pH: x  Gluc: x / Ketone: Negative  / Bili: Negative / Urobili: Negative   Blood: x / Protein: 75 mg/dL / Nitrite: Positive   Leuk Esterase: Negative / RBC: 0-2 /HPF / WBC 0-2   Sq Epi: x / Non Sq Epi: Occasional / Bacteria: Few        CAPILLARY BLOOD GLUCOSE      POCT Blood Glucose.: 389 mg/dL (2018 11:30)  POCT Blood Glucose.: 397 mg/dL (2018 07:50)  POCT Blood Glucose.: 259 mg/dL (2018 22:17)  POCT Blood Glucose.: 248 mg/dL (2018 17:08)  POCT Blood Glucose.: 329 mg/dL (2018 14:07)          RECENT CULTURES:  Culture Results:   <10,000 CFU/ml  Normal Urogenital elizabeth present ( @ 23:20)  Culture Results:   No growth to date. ( 21:16)  Culture Results:   No growth to date. ( 21:16)        Culture - Urine (collected 18 @ 23:20)  Source: .Urine Clean Catch (Midstream)  Final Report (18 @ 23:25):    <10,000 CFU/ml    Normal Urogenital elizabeth present    Culture - Blood (collected 18 @ 21:16)  Source: .Blood Blood-Venous  Preliminary Report (18 @ 22:02):    No growth to date.    Culture - Blood (collected 18 @ 21:16)  Source: .Blood Blood-Peripheral  Preliminary Report (18 @ 22:02):    No growth to date.
PCP  Subjective:   in bed , awake , alert , responsive , calm , but nervouse , awaiting transfer to psych unit ,     Objective:   Vital Signs Last 24 Hrs  T(C): 36.9 (18 @ 04:24), Max: 37.1 (18 @ 13:59)  T(F): 98.5 (18 @ 04:24), Max: 98.7 (18 @ 13:59)  HR: 66 (18 @ 07:58) (66 - 75)  BP: 148/84 (18 @ 04:24) (138/65 - 148/84)  BP(mean): --  RR: 18 (18 @ 04:24) (18 - 18)  SpO2: 99% (18 @ 07:58) (98% - 99%)  Daily     Daily Weight in k.9 (2018 04:24)     Physical Exam:  · Constitutional	detailed exam	  · Constitutional Details	well-developed; well-groomed; well-nourished; obese	  · Constitutional Comments	weak looking	  · Eyes	EOMI; PERRL; no drainage or redness	  · ENMT	No oral lesions; no gross abnormalities	  · Neck	No bruits; no thyromegaly or nodules	  · Back	detailed exam	  · Back Comments	wears a back brace	  · Respiratory	Breath Sounds equal & clear to percussion & auscultation, no accessory muscle use	  · Cardiovascular	Regular rate & rhythm, normal S1, S2; no murmurs, gallops or rubs; no S3, S4	  · Gastrointestinal	detailed exam	  · GI Normal	no rebound tenderness	  · GI Abnormal	tender  distended	  · Tenderness	LUQ; RLQ	  · Extremities	detailed exam	  · Extremities Details	pedal edema	  · Pedal Edema Severity	1+	  · Pedal Edema Type	pitting	  · Vascular	Equal and normal pulses (carotid, femoral, dorsalis pedis)	    Allergies: Allergies    apple (Unknown)  No Known Drug Allergies  Peaches (Unknown)  raw fruits and vetables (Unknown)    Intolerances        Home Medications:  aspirin 81 mg oral delayed release tablet: 1 tab(s) orally once a day (2018 13:32)  citalopram:  (2018 01:11)  famotidine 20 mg oral tablet: 1 tab(s) orally 2 times a day (2018 13:32)  furosemide 20 mg oral tablet: 1 tab(s) orally 2 times a day (:11)  gabapentin 600 mg oral tablet: 1 tab(s) orally 2 times a day (:11)  insulin:  (:)  insulin glargine:  (2018 13:32)  insulin lispro (concentrated) 200 units/mL subcutaneous solution:  subcutaneous  (2018 13:32)  KlonoPIN:  (:)  lactobacillus acidophilus oral capsule:  orally  (:32)  Lipitor:  (:)  lisinopril 2.5 mg oral tablet: 1 tab(s) orally once a day (:)  mirtazapine 45 mg oral tablet, disintegrating:  (:)  nicotine 14 mg/24 hr transdermal film, extended release:  transdermal  (:32)  oxyCODONE 15 mg oral tablet, extended release: 1 tab(s) orally 3 times a day (with meals) (:)  Singulair 10 mg oral tablet:  (:11)  Synthroid 50 mcg (0.05 mg) oral tablet: 1 tab(s) orally once a day (:11)  traZODone 100 mg oral tablet: 200 milligram(s) orally once a day (at bedtime) (:11)  Ventolin:  (:11)    Medications:   acetaminophen   Tablet. 650 milliGRAM(s) Oral every 6 hours PRN  ALBUTerol/ipratropium for Nebulization 3 milliLiter(s) Nebulizer three times a day  aspirin enteric coated 81 milliGRAM(s) Oral daily  atorvastatin 20 milliGRAM(s) Oral at bedtime  buDESOnide   0.5 milliGRAM(s) Respule 0.5 milliGRAM(s) Inhalation two times a day  citalopram 20 milliGRAM(s) Oral daily  clonazePAM Tablet 0.5 milliGRAM(s) Oral three times a day  dextrose 40% Gel 15 Gram(s) Oral once PRN  dextrose 40% Gel 15 Gram(s) Oral once PRN  dextrose 5%. 1000 milliLiter(s) IV Continuous <Continuous>  dextrose 5%. 1000 milliLiter(s) IV Continuous <Continuous>  dextrose 50% Injectable 12.5 Gram(s) IV Push once  dextrose 50% Injectable 25 Gram(s) IV Push once  dextrose 50% Injectable 25 Gram(s) IV Push once  dextrose 50% Injectable 12.5 Gram(s) IV Push once  dextrose 50% Injectable 25 Gram(s) IV Push once  dextrose 50% Injectable 25 Gram(s) IV Push once  enoxaparin Injectable 40 milliGRAM(s) SubCutaneous daily  famotidine    Tablet 20 milliGRAM(s) Oral two times a day  furosemide    Tablet 20 milliGRAM(s) Oral two times a day  gabapentin 600 milliGRAM(s) Oral two times a day  glucagon  Injectable 1 milliGRAM(s) IntraMuscular once PRN  glucagon  Injectable 1 milliGRAM(s) IntraMuscular once PRN  insulin glargine Injectable (LANTUS) 30 Unit(s) SubCutaneous two times a day  insulin lispro (HumaLOG) corrective regimen sliding scale   SubCutaneous three times a day before meals  insulin lispro Injectable (HumaLOG) 10 Unit(s) SubCutaneous before breakfast  insulin lispro Injectable (HumaLOG) 10 Unit(s) SubCutaneous before lunch  insulin lispro Injectable (HumaLOG) 10 Unit(s) SubCutaneous before dinner  lactobacillus acidophilus 1 Tablet(s) Oral two times a day with meals  levothyroxine 50 MICROGram(s) Oral daily  lisinopril 2.5 milliGRAM(s) Oral daily  mirtazapine Soltab 30 milliGRAM(s) Oral daily  nicotine -  14 mG/24Hr(s) Patch 1 patch Transdermal daily  ondansetron Injectable 4 milliGRAM(s) IV Push every 6 hours PRN  oxyCODONE    IR 10 milliGRAM(s) Oral four times a day PRN  traZODone 100 milliGRAM(s) Oral at bedtime      LABS:                        9.6    12.27 )-----------( 232      ( 2018 10:07 )             29.9     07-24    139  |  106  |  25<H>  ----------------------------<  240<H>  4.3   |  24  |  0.86    Ca    8.9      2018 07:36    TPro  7.4  /  Alb  3.1<L>  /  TBili  0.4  /  DBili  x   /  AST  14<L>  /  ALT  24  /  AlkPhos  103              CAPILLARY BLOOD GLUCOSE      POCT Blood Glucose.: 282 mg/dL (2018 12:04)  POCT Blood Glucose.: 353 mg/dL (2018 07:33)  POCT Blood Glucose.: 234 mg/dL (2018 21:24)  POCT Blood Glucose.: 197 mg/dL (2018 17:09)          RECENT CULTURES:  Culture Results:   <10,000 CFU/ml  Normal Urogenital elizabeth present ( @ 23:20)  Culture Results:   No growth at 5 days. ( @ 21:16)  Culture Results:   No growth at 5 days. ( @ 21:16)        Culture - Urine (collected 18 @ 23:20)  Source: .Urine Clean Catch (Midstream)  Final Report (18 @ 23:25):    <10,000 CFU/ml    Normal Urogenital elizabeth present    Culture - Blood (collected 18 @ 21:16)  Source: .Blood Blood-Venous  Final Report (18 @ 22:00):    No growth at 5 days.    Culture - Blood (collected 18 @ 21:16)  Source: .Blood Blood-Peripheral  Final Report (18 @ 22:00):    No growth at 5 days.
PCP  Subjective:   in bed , awake , alert responsive , worried , about renal issues and potential testing , concerned over pyelone[hritis , and also poor dm control due to poor compliance     Objective:   Vital Signs Last 24 Hrs  T(C): 36.7 (07-23-18 @ 05:24), Max: 37.1 (07-22-18 @ 13:41)  T(F): 98 (07-23-18 @ 05:24), Max: 98.7 (07-22-18 @ 13:41)  HR: 53 (07-23-18 @ 05:24) (53 - 80)  BP: 145/76 (07-23-18 @ 05:24) (145/76 - 160/75)  BP(mean): --  RR: 17 (07-23-18 @ 05:24) (17 - 18)  SpO2: 95% (07-23-18 @ 05:24) (95% - 99%)  Daily     Daily      Physical Exam:  · Constitutional	detailed exam	  · Constitutional Details	well-developed; well-groomed; well-nourished; obese	  · Constitutional Comments	weak looking	  · Eyes	EOMI; PERRL; no drainage or redness	  · ENMT	No oral lesions; no gross abnormalities	  · Neck	No bruits; no thyromegaly or nodules	  · Back	detailed exam	  · Back Comments	wears a back brace	  · Respiratory	Breath Sounds equal & clear to percussion & auscultation, no accessory muscle use	  · Cardiovascular	Regular rate & rhythm, normal S1, S2; no murmurs, gallops or rubs; no S3, S4	  · Gastrointestinal	detailed exam	  · GI Normal	no rebound tenderness	  · GI Abnormal	tender  distended	  · Tenderness	LUQ; RLQ	  · Extremities	detailed exam	  · Extremities Details	pedal edema	  · Pedal Edema Severity	1+	  · Pedal Edema Type	pitting	  · Vascular	Equal and normal pulses (carotid, femoral, dorsalis pedis)	  · Neurological	detailed exam awake , alert responsive , depressed , non focal , 	      Allergies: Allergies    apple (Unknown)  iodine (Unknown)  Peaches (Unknown)  raw fruits and vetables (Unknown)    Intolerances        Home Medications:  aspirin 81 mg oral delayed release tablet: 1 tab(s) orally once a day (21 Jul 2018 13:32)  citalopram:  (20 Jul 2018 01:11)  famotidine 20 mg oral tablet: 1 tab(s) orally 2 times a day (21 Jul 2018 13:32)  furosemide 20 mg oral tablet: 1 tab(s) orally 2 times a day (20 Jul 2018 01:11)  gabapentin 600 mg oral tablet: 1 tab(s) orally 2 times a day (20 Jul 2018 01:11)  insulin:  (20 Jul 2018 01:11)  insulin glargine:  (21 Jul 2018 13:32)  insulin lispro (concentrated) 200 units/mL subcutaneous solution:  subcutaneous  (21 Jul 2018 13:32)  KlonoPIN:  (20 Jul 2018 01:11)  lactobacillus acidophilus oral capsule:  orally  (21 Jul 2018 13:32)  Lipitor:  (20 Jul 2018 01:11)  lisinopril 2.5 mg oral tablet: 1 tab(s) orally once a day (20 Jul 2018 01:11)  mirtazapine 45 mg oral tablet, disintegrating:  (20 Jul 2018 01:11)  nicotine 14 mg/24 hr transdermal film, extended release:  transdermal  (21 Jul 2018 13:32)  oxyCODONE 15 mg oral tablet, extended release: 1 tab(s) orally 3 times a day (with meals) (20 Jul 2018 01:11)  Singulair 10 mg oral tablet:  (20 Jul 2018 01:11)  Synthroid 50 mcg (0.05 mg) oral tablet: 1 tab(s) orally once a day (20 Jul 2018 01:11)  traZODone 100 mg oral tablet: 200 milligram(s) orally once a day (at bedtime) (20 Jul 2018 01:11)  Ventolin:  (20 Jul 2018 01:11)    Medications:   acetaminophen   Tablet. 650 milliGRAM(s) Oral every 6 hours PRN  ALBUTerol/ipratropium for Nebulization 3 milliLiter(s) Nebulizer three times a day  aspirin enteric coated 81 milliGRAM(s) Oral daily  atorvastatin 20 milliGRAM(s) Oral at bedtime  buDESOnide   0.5 milliGRAM(s) Respule 0.5 milliGRAM(s) Inhalation two times a day  cefTRIAXone   IVPB 1 Gram(s) IV Intermittent every 24 hours  citalopram 20 milliGRAM(s) Oral daily  clonazePAM Tablet 0.5 milliGRAM(s) Oral three times a day  dextrose 40% Gel 15 Gram(s) Oral once PRN  dextrose 40% Gel 15 Gram(s) Oral once PRN  dextrose 5%. 1000 milliLiter(s) IV Continuous <Continuous>  dextrose 5%. 1000 milliLiter(s) IV Continuous <Continuous>  dextrose 50% Injectable 12.5 Gram(s) IV Push once  dextrose 50% Injectable 25 Gram(s) IV Push once  dextrose 50% Injectable 25 Gram(s) IV Push once  dextrose 50% Injectable 12.5 Gram(s) IV Push once  dextrose 50% Injectable 25 Gram(s) IV Push once  dextrose 50% Injectable 25 Gram(s) IV Push once  enoxaparin Injectable 40 milliGRAM(s) SubCutaneous daily  famotidine    Tablet 20 milliGRAM(s) Oral two times a day  furosemide    Tablet 20 milliGRAM(s) Oral two times a day  gabapentin 600 milliGRAM(s) Oral two times a day  glucagon  Injectable 1 milliGRAM(s) IntraMuscular once PRN  glucagon  Injectable 1 milliGRAM(s) IntraMuscular once PRN  insulin glargine Injectable (LANTUS) 30 Unit(s) SubCutaneous at bedtime  insulin glargine Injectable (LANTUS) 15 Unit(s) SubCutaneous every morning  insulin lispro (HumaLOG) corrective regimen sliding scale   SubCutaneous three times a day before meals  insulin lispro Injectable (HumaLOG) 10 Unit(s) SubCutaneous before breakfast  insulin lispro Injectable (HumaLOG) 10 Unit(s) SubCutaneous before lunch  insulin lispro Injectable (HumaLOG) 10 Unit(s) SubCutaneous before dinner  lactobacillus acidophilus 1 Tablet(s) Oral two times a day with meals  levothyroxine 50 MICROGram(s) Oral daily  lisinopril 2.5 milliGRAM(s) Oral daily  mirtazapine Soltab 30 milliGRAM(s) Oral daily  nicotine -  14 mG/24Hr(s) Patch 1 patch Transdermal daily  ondansetron Injectable 4 milliGRAM(s) IV Push every 6 hours PRN  oxyCODONE    IR 10 milliGRAM(s) Oral four times a day PRN  traZODone 100 milliGRAM(s) Oral at bedtime      LABS:                        9.6    13.02 )-----------( 240      ( 23 Jul 2018 07:06 )             29.6     07-23    138  |  105  |  28<H>  ----------------------------<  373<H>  4.2   |  25  |  0.99    Ca    8.6      23 Jul 2018 07:06    TPro  6.9  /  Alb  3.0<L>  /  TBili  0.3  /  DBili  x   /  AST  10<L>  /  ALT  21  /  AlkPhos  100  07-23 07-19 @ 19:46  INR 0.97        CAPILLARY BLOOD GLUCOSE      POCT Blood Glucose.: 380 mg/dL (23 Jul 2018 08:17)  POCT Blood Glucose.: 226 mg/dL (22 Jul 2018 22:17)  POCT Blood Glucose.: 270 mg/dL (22 Jul 2018 17:15)  POCT Blood Glucose.: 314 mg/dL (22 Jul 2018 12:23)          RECENT CULTURES:  Culture Results:   <10,000 CFU/ml  Normal Urogenital elizabeth present (07-19 @ 23:20)  Culture Results:   No growth to date. (07-19 @ 21:16)  Culture Results:   No growth to date. (07-19 @ 21:16)        Culture - Urine (collected 07-19-18 @ 23:20)  Source: .Urine Clean Catch (Midstream)  Final Report (07-20-18 @ 23:25):    <10,000 CFU/ml    Normal Urogenital elizabeth present    Culture - Blood (collected 07-19-18 @ 21:16)  Source: .Blood Blood-Venous  Preliminary Report (07-20-18 @ 22:02):    No growth to date.    Culture - Blood (collected 07-19-18 @ 21:16)  Source: .Blood Blood-Peripheral  Preliminary Report (07-20-18 @ 22:02):    No growth to date.
PCP  Subjective:   in bed , awake , alert , and responsive , and weak , and still has many questions   also worried , and interseted to get admitted for depression ,     Objective:   Vital Signs Last 24 Hrs  T(C): 37.1 (07-24-18 @ 13:59), Max: 37.1 (07-23-18 @ 20:35)  T(F): 98.7 (07-24-18 @ 13:59), Max: 98.7 (07-23-18 @ 20:35)  HR: 70 (07-24-18 @ 13:59) (63 - 74)  BP: 138/65 (07-24-18 @ 13:59) (129/64 - 145/81)  BP(mean): --  RR: 18 (07-24-18 @ 13:59) (17 - 18)  SpO2: 98% (07-24-18 @ 13:59) (93% - 100%)  Daily     Daily      Physical Exam:  · Constitutional	detailed exam	  · Constitutional Details	well-developed; well-groomed; well-nourished; obese	  · Constitutional Comments	weak looking	  · Eyes	EOMI; PERRL; no drainage or redness	  · ENMT	No oral lesions; no gross abnormalities	  · Neck	No bruits; no thyromegaly or nodules	  · Back	detailed exam	  · Back Comments	wears a back brace	  · Respiratory	Breath Sounds equal & clear to percussion & auscultation, no accessory muscle use	  · Cardiovascular	Regular rate & rhythm, normal S1, S2; no murmurs, gallops or rubs; no S3, S4	  · Gastrointestinal	detailed exam	  · GI Normal	no rebound tenderness	  · GI Abnormal	tender  distended	  · Tenderness	LUQ; RLQ	  · Extremities	detailed exam	  · Extremities Details	pedal edema	  · Pedal Edema Severity	1+	  · Pedal Edema Type	pitting	  · Vascular	Equal and normal pulses (carotid, femoral, dorsalis pedis)	  · Neurological	detailed exam awake , alert responsive , depressed , non focal , 	          Allergies: Allergies    apple (Unknown)  Drug Allergies Not Recorded  Peaches (Unknown)  raw fruits and vetables (Unknown)    Intolerances        Home Medications:  aspirin 81 mg oral delayed release tablet: 1 tab(s) orally once a day (21 Jul 2018 13:32)  citalopram:  (20 Jul 2018 01:11)  famotidine 20 mg oral tablet: 1 tab(s) orally 2 times a day (21 Jul 2018 13:32)  furosemide 20 mg oral tablet: 1 tab(s) orally 2 times a day (20 Jul 2018 01:11)  gabapentin 600 mg oral tablet: 1 tab(s) orally 2 times a day (20 Jul 2018 01:11)  insulin:  (20 Jul 2018 01:11)  insulin glargine:  (21 Jul 2018 13:32)  insulin lispro (concentrated) 200 units/mL subcutaneous solution:  subcutaneous  (21 Jul 2018 13:32)  KlonoPIN:  (20 Jul 2018 01:11)  lactobacillus acidophilus oral capsule:  orally  (21 Jul 2018 13:32)  Lipitor:  (20 Jul 2018 01:11)  lisinopril 2.5 mg oral tablet: 1 tab(s) orally once a day (20 Jul 2018 01:11)  mirtazapine 45 mg oral tablet, disintegrating:  (20 Jul 2018 01:11)  nicotine 14 mg/24 hr transdermal film, extended release:  transdermal  (21 Jul 2018 13:32)  oxyCODONE 15 mg oral tablet, extended release: 1 tab(s) orally 3 times a day (with meals) (20 Jul 2018 01:11)  Singulair 10 mg oral tablet:  (20 Jul 2018 01:11)  Synthroid 50 mcg (0.05 mg) oral tablet: 1 tab(s) orally once a day (20 Jul 2018 01:11)  traZODone 100 mg oral tablet: 200 milligram(s) orally once a day (at bedtime) (20 Jul 2018 01:11)  Ventolin:  (20 Jul 2018 01:11)    Medications:   acetaminophen   Tablet. 650 milliGRAM(s) Oral every 6 hours PRN  ALBUTerol/ipratropium for Nebulization 3 milliLiter(s) Nebulizer three times a day  aspirin enteric coated 81 milliGRAM(s) Oral daily  atorvastatin 20 milliGRAM(s) Oral at bedtime  buDESOnide   0.5 milliGRAM(s) Respule 0.5 milliGRAM(s) Inhalation two times a day  cefTRIAXone   IVPB 1 Gram(s) IV Intermittent every 24 hours  citalopram 20 milliGRAM(s) Oral daily  clonazePAM Tablet 0.5 milliGRAM(s) Oral three times a day  dextrose 40% Gel 15 Gram(s) Oral once PRN  dextrose 40% Gel 15 Gram(s) Oral once PRN  dextrose 5%. 1000 milliLiter(s) IV Continuous <Continuous>  dextrose 5%. 1000 milliLiter(s) IV Continuous <Continuous>  dextrose 50% Injectable 12.5 Gram(s) IV Push once  dextrose 50% Injectable 25 Gram(s) IV Push once  dextrose 50% Injectable 25 Gram(s) IV Push once  dextrose 50% Injectable 12.5 Gram(s) IV Push once  dextrose 50% Injectable 25 Gram(s) IV Push once  dextrose 50% Injectable 25 Gram(s) IV Push once  enoxaparin Injectable 40 milliGRAM(s) SubCutaneous daily  famotidine    Tablet 20 milliGRAM(s) Oral two times a day  furosemide    Tablet 20 milliGRAM(s) Oral two times a day  gabapentin 600 milliGRAM(s) Oral two times a day  glucagon  Injectable 1 milliGRAM(s) IntraMuscular once PRN  glucagon  Injectable 1 milliGRAM(s) IntraMuscular once PRN  insulin glargine Injectable (LANTUS) 30 Unit(s) SubCutaneous at bedtime  insulin glargine Injectable (LANTUS) 15 Unit(s) SubCutaneous every morning  insulin lispro (HumaLOG) corrective regimen sliding scale   SubCutaneous three times a day before meals  insulin lispro Injectable (HumaLOG) 10 Unit(s) SubCutaneous before breakfast  insulin lispro Injectable (HumaLOG) 10 Unit(s) SubCutaneous before lunch  insulin lispro Injectable (HumaLOG) 10 Unit(s) SubCutaneous before dinner  lactobacillus acidophilus 1 Tablet(s) Oral two times a day with meals  levothyroxine 50 MICROGram(s) Oral daily  lisinopril 2.5 milliGRAM(s) Oral daily  mirtazapine Soltab 30 milliGRAM(s) Oral daily  nicotine -  14 mG/24Hr(s) Patch 1 patch Transdermal daily  ondansetron Injectable 4 milliGRAM(s) IV Push every 6 hours PRN  oxyCODONE    IR 10 milliGRAM(s) Oral four times a day PRN  traZODone 100 milliGRAM(s) Oral at bedtime      LABS:                        9.6    12.27 )-----------( 232      ( 24 Jul 2018 10:07 )             29.9     07-24    139  |  106  |  25<H>  ----------------------------<  240<H>  4.3   |  24  |  0.86    Ca    8.9      24 Jul 2018 07:36    TPro  7.4  /  Alb  3.1<L>  /  TBili  0.4  /  DBili  x   /  AST  14<L>  /  ALT  24  /  AlkPhos  103  07-24 07-19 @ 19:46  INR 0.97        CAPILLARY BLOOD GLUCOSE      POCT Blood Glucose.: 197 mg/dL (24 Jul 2018 17:09)  POCT Blood Glucose.: 314 mg/dL (24 Jul 2018 12:46)  POCT Blood Glucose.: 288 mg/dL (24 Jul 2018 07:25)  POCT Blood Glucose.: 178 mg/dL (23 Jul 2018 22:16)          RECENT CULTURES:  Culture Results:   <10,000 CFU/ml  Normal Urogenital elizabeth present (07-19 @ 23:20)  Culture Results:   No growth to date. (07-19 @ 21:16)  Culture Results:   No growth to date. (07-19 @ 21:16)        Culture - Urine (collected 07-19-18 @ 23:20)  Source: .Urine Clean Catch (Midstream)  Final Report (07-20-18 @ 23:25):    <10,000 CFU/ml    Normal Urogenital elizabeth present    Culture - Blood (collected 07-19-18 @ 21:16)  Source: .Blood Blood-Venous  Preliminary Report (07-20-18 @ 22:02):    No growth to date.    Culture - Blood (collected 07-19-18 @ 21:16)  Source: .Blood Blood-Peripheral  Preliminary Report (07-20-18 @ 22:02):    No growth to date.
PCP  Subjective:   in bed , awake , alert ,     Objective:   Vital Signs Last 24 Hrs  T(C): 37 (07-26-18 @ 05:05), Max: 37.3 (07-25-18 @ 22:21)  T(F): 98.6 (07-26-18 @ 05:05), Max: 99.1 (07-25-18 @ 22:21)  HR: 62 (07-26-18 @ 05:05) (62 - 86)  BP: 130/79 (07-26-18 @ 05:05) (130/79 - 156/84)  BP(mean): --  RR: 16 (07-26-18 @ 05:05) (16 - 17)  SpO2: 98% (07-26-18 @ 05:05) (96% - 99%)  Daily     Daily        Physical Exam:  · Constitutional	detailed exam	  · Constitutional Details	well-developed; well-groomed; well-nourished; obese	  · Constitutional Comments	weak looking	  · Eyes	EOMI; PERRL; no drainage or redness	  · ENMT	No oral lesions; no gross abnormalities	  · Neck	No bruits; no thyromegaly or nodules	  · Back	detailed exam	  · Back Comments	wears a back brace	  · Respiratory	Breath Sounds equal & clear to percussion & auscultation, no accessory muscle use	  · Cardiovascular	Regular rate & rhythm, normal S1, S2; no murmurs, gallops or rubs; no S3, S4	  · Gastrointestinal	detailed exam	  · GI Normal	no rebound tenderness	  · GI Abnormal	tender  distended	  · Tenderness	LUQ; RLQ	  · Extremities	detailed exam	  · Extremities Details	pedal edema	  · Pedal Edema Severity	1+	  · Pedal Edema Type	pitting	  · Vascular	Equal and normal pulses (carotid, femoral, dorsalis pedis)	      Allergies: Allergies    apple (Unknown)  No Known Drug Allergies  Peaches (Unknown)  raw fruits and vetables (Unknown)    Intolerances        Home Medications:  aspirin 81 mg oral delayed release tablet: 1 tab(s) orally once a day (21 Jul 2018 13:32)  citalopram:  (20 Jul 2018 01:11)  famotidine 20 mg oral tablet: 1 tab(s) orally 2 times a day (21 Jul 2018 13:32)  furosemide 20 mg oral tablet: 1 tab(s) orally 2 times a day (20 Jul 2018 01:11)  gabapentin 600 mg oral tablet: 1 tab(s) orally 2 times a day (20 Jul 2018 01:11)  insulin:  (20 Jul 2018 01:11)  insulin glargine: 30 unit(s) subcutaneous 2 times a day (25 Jul 2018 13:21)  insulin lispro (concentrated) 200 units/mL subcutaneous solution:  subcutaneous  (21 Jul 2018 13:32)  KlonoPIN:  (20 Jul 2018 01:11)  lactobacillus acidophilus oral capsule:  orally  (21 Jul 2018 13:32)  Lipitor:  (20 Jul 2018 01:11)  lisinopril 2.5 mg oral tablet: 1 tab(s) orally once a day (20 Jul 2018 01:11)  mirtazapine 45 mg oral tablet, disintegrating:  (20 Jul 2018 01:11)  nicotine 14 mg/24 hr transdermal film, extended release:  transdermal  (21 Jul 2018 13:32)  oxyCODONE 15 mg oral tablet, extended release: 1 tab(s) orally 3 times a day (with meals) (20 Jul 2018 01:11)  Singulair 10 mg oral tablet:  (20 Jul 2018 01:11)  Synthroid 50 mcg (0.05 mg) oral tablet: 1 tab(s) orally once a day (20 Jul 2018 01:11)  traZODone 100 mg oral tablet: 200 milligram(s) orally once a day (at bedtime) (20 Jul 2018 01:11)  Ventolin:  (20 Jul 2018 01:11)    Medications:   acetaminophen   Tablet. 650 milliGRAM(s) Oral every 6 hours PRN  ALBUTerol/ipratropium for Nebulization 3 milliLiter(s) Nebulizer three times a day  aspirin enteric coated 81 milliGRAM(s) Oral daily  atorvastatin 20 milliGRAM(s) Oral at bedtime  buDESOnide   0.5 milliGRAM(s) Respule 0.5 milliGRAM(s) Inhalation two times a day  citalopram 20 milliGRAM(s) Oral daily  clonazePAM Tablet 0.5 milliGRAM(s) Oral three times a day  dextrose 40% Gel 15 Gram(s) Oral once PRN  dextrose 40% Gel 15 Gram(s) Oral once PRN  dextrose 5%. 1000 milliLiter(s) IV Continuous <Continuous>  dextrose 5%. 1000 milliLiter(s) IV Continuous <Continuous>  dextrose 50% Injectable 12.5 Gram(s) IV Push once  dextrose 50% Injectable 25 Gram(s) IV Push once  dextrose 50% Injectable 25 Gram(s) IV Push once  dextrose 50% Injectable 12.5 Gram(s) IV Push once  dextrose 50% Injectable 25 Gram(s) IV Push once  dextrose 50% Injectable 25 Gram(s) IV Push once  enoxaparin Injectable 40 milliGRAM(s) SubCutaneous daily  famotidine    Tablet 20 milliGRAM(s) Oral two times a day  furosemide    Tablet 20 milliGRAM(s) Oral two times a day  gabapentin 600 milliGRAM(s) Oral two times a day  glucagon  Injectable 1 milliGRAM(s) IntraMuscular once PRN  glucagon  Injectable 1 milliGRAM(s) IntraMuscular once PRN  insulin glargine Injectable (LANTUS) 30 Unit(s) SubCutaneous two times a day  insulin lispro (HumaLOG) corrective regimen sliding scale   SubCutaneous three times a day before meals  insulin lispro Injectable (HumaLOG) 10 Unit(s) SubCutaneous before breakfast  insulin lispro Injectable (HumaLOG) 10 Unit(s) SubCutaneous before lunch  insulin lispro Injectable (HumaLOG) 10 Unit(s) SubCutaneous before dinner  lactobacillus acidophilus 1 Tablet(s) Oral two times a day with meals  levothyroxine 50 MICROGram(s) Oral daily  lisinopril 2.5 milliGRAM(s) Oral daily  mirtazapine Soltab 30 milliGRAM(s) Oral daily  nicotine -  14 mG/24Hr(s) Patch 1 patch Transdermal daily  ondansetron Injectable 4 milliGRAM(s) IV Push every 6 hours PRN  oxyCODONE    IR 10 milliGRAM(s) Oral four times a day PRN  traZODone 100 milliGRAM(s) Oral at bedtime      LABS:                        9.6    12.27 )-----------( 232      ( 24 Jul 2018 10:07 )             29.9     07-24    139  |  106  |  25<H>  ----------------------------<  240<H>  4.3   |  24  |  0.86    Ca    8.9      24 Jul 2018 07:36    TPro  7.4  /  Alb  3.1<L>  /  TBili  0.4  /  DBili  x   /  AST  14<L>  /  ALT  24  /  AlkPhos  103  07-24            CAPILLARY BLOOD GLUCOSE      POCT Blood Glucose.: 294 mg/dL (25 Jul 2018 22:16)  POCT Blood Glucose.: 127 mg/dL (25 Jul 2018 16:57)  POCT Blood Glucose.: 282 mg/dL (25 Jul 2018 12:04)          RECENT CULTURES:  Culture Results:   <10,000 CFU/ml  Normal Urogenital elizabeth present (07-19 @ 23:20)  Culture Results:   No growth at 5 days. (07-19 @ 21:16)  Culture Results:   No growth at 5 days. (07-19 @ 21:16)        Culture - Urine (collected 07-19-18 @ 23:20)  Source: .Urine Clean Catch (Midstream)  Final Report (07-20-18 @ 23:25):    <10,000 CFU/ml    Normal Urogenital elizabeth present    Culture - Blood (collected 07-19-18 @ 21:16)  Source: .Blood Blood-Venous  Final Report (07-24-18 @ 22:00):    No growth at 5 days.    Culture - Blood (collected 07-19-18 @ 21:16)  Source: .Blood Blood-Peripheral  Final Report (07-24-18 @ 22:00):    No growth at 5 days.

## 2018-07-26 NOTE — PROGRESS NOTE ADULT - ASSESSMENT
this is a 45 yo female , single no kids , with hx of chronic pain syndrome on multiple pain meds by pain management , , and neuropathy and diabetes and poor control and poor compliance diabetes and severe s recurrent major depression and anxiety disorder who follows with psych  and hx of multiple c spine , ls spine and abdominal surgeries and carpal tunnel syndrome and gyn surgeries for hysterectomy  , and recent mrsa post abdominal gyn surgery requiring home iv abx for 6-8 weeks , hx of laminectomy and hardware in ls spine and c spine , obesity , poor compliance with diabetes , hx of psych admission , p  patient presented to office yesterday and complain of abdominal pain , nausea , weakness , lethargy and poor po intake ,   in er anemia , lactae high , ua positive , sugar high and ct abdomen with ? pyelonephritis ,   admit control blood sugar and dm , educate , IV abx for ? kidney infection , probiotics , pain control ,   patient complain of some wheezing , will add nebs , also concerned about joint pain and will do arthritis profile , also concerned about pyelonehritis and will repeat ct with iv contrast , on iv rocephine , get nephrology and psych eval ,
this is a 45 yo female , single no kids , with hx of chronic pain syndrome on multiple pain meds by pain management , , and neuropathy and diabetes and poor control and poor compliance diabetes and severe s recurrent major depression and anxiety disorder who follows with psych  and hx of multiple c spine , ls spine and abdominal surgeries and carpal tunnel syndrome and gyn surgeries for hysterectomy  , and recent mrsa post abdominal gyn surgery requiring home iv abx for 6-8 weeks , hx of laminectomy and hardware in ls spine and c spine , obesity , poor compliance with diabetes , hx of psych admission , p  patient presented to office yesterday and complain of abdominal pain , nausea , weakness , lethargy and poor po intake ,   in er anemia , lactae high , ua positive , sugar high and ct abdomen with ? pyelonephritis ,   admit control blood sugar and dm , educate , IV abx for ? kidney infection , probiotics , pain control ,
this is a 45 yo female , single no kids , with hx of chronic pain syndrome on multiple pain meds by pain management , , and neuropathy and diabetes and poor control and poor compliance diabetes and severe s recurrent major depression and anxiety disorder who follows with psych  and hx of multiple c spine , ls spine and abdominal surgeries and carpal tunnel syndrome and gyn surgeries for hysterectomy  , and recent mrsa post abdominal gyn surgery requiring home iv abx for 6-8 weeks , hx of laminectomy and hardware in ls spine and c spine , obesity , poor compliance with diabetes , hx of psych admission , p  patient presented to office yesterday and complain of abdominal pain , nausea , weakness , lethargy and poor po intake ,   in er anemia , lactae high , ua positive , sugar high and ct abdomen with ? pyelonephritis ,   admit control blood sugar and dm , educate , IV abx for ? kidney infection , probiotics , pain control ,   patient complain of some wheezing , will add nebs , also concerned about joint pain and will do arthritis profile , also concerned about pyelonehritis and will repeat ct with iv contrast , on iv rocephine , get nephrology and psych eval ,   on 7/24/18 patient had repeat ct abd which was negative , and also seen by nephro , and psych noted , plan admission to psych in am ,   as per psychiatry:     EXAM:  Vital Signs Last 24 Hrs  T(C): 36.9 (07-24-18 @ 05:14), Max: 37.1 (07-23-18 @ 20:35)  T(F): 98.5 (07-24-18 @ 05:14), Max: 98.7 (07-23-18 @ 20:35)  HR: 63 (07-24-18 @ 08:07) (63 - 74)  BP: 145/81 (07-24-18 @ 05:14) (129/64 - 164/83)  BP(mean): --  RR: 17 (07-24-18 @ 05:14) (17 - 17)  SpO2: 93% (07-24-18 @ 08:07) (93% - 100%)  Gen Appearance: Fair grooming and hygiene   Gait/Station/Muscle Tone: WNL  Abnl Movements: Absent  Speech: Normoproductive, relevant  TP; WNL  Associations: WNL  TC: Patient is expressing passive suicidal ideation  Mood: Depressed, tearful  Affect: labile  Consciousness/orientation: WNL  Memory:   Recent: WNL   Remote: WNL  Attention/Concentration: WNL  Language: WNL  Fund of Knowledge: Average  Insight: Fair  Judgment: Fair    Suicide Risk Assessment: At this time patient is unable to contract for safety, and requests inpatient psychiatric admission      DX: Adjustment disorder with anxiety and depression vs MDD, opioid dependence    REC: Continue current treatment, will admit to an inpatient psychiatric unit, once medically stable  Observation Status: Regular  Follow-up: As needed    Attending Attestation:   Plan discussed with Dr. Deleon.
·	Mild hyponatremia  ·	Depression  ·	Proteinuria, Diabetic Nephropathy  ·	Diabetes  ·	Hypertension     Sodium levels improved and stable.  Pt with diabetic nephropathy, On ACEI.    Monitor blood sugar levels. Insulin coverage as needed. Dietary restriction.   Monitor BP trend. Titrate BP meds as needed. Salt restriction.   Psych evaluation. Will follow electrolytes and renal function trend. D/c planning.
this is a 45 yo female , single no kids , with hx of chronic pain syndrome on multiple pain meds by pain management , , and neuropathy and diabetes and poor control and poor compliance diabetes and severe s recurrent major depression and anxiety disorder who follows with psych  and hx of multiple c spine , ls spine and abdominal surgeries and carpal tunnel syndrome and gyn surgeries for hysterectomy  , and recent mrsa post abdominal gyn surgery requiring home iv abx for 6-8 weeks , hx of laminectomy and hardware in ls spine and c spine , obesity , poor compliance with diabetes , hx of psych admission , p  patient presented to office yesterday and complain of abdominal pain , nausea , weakness , lethargy and poor po intake ,   in er anemia , lactae high , ua positive , sugar high and ct abdomen with ? pyelonephritis ,   admit control blood sugar and dm , educate , IV abx for ? kidney infection , probiotics , pain control ,   patient complain of some wheezing , will add nebs , also concerned about joint pain and will do arthritis profile , also concerned about pyelonehritis and will repeat ct with iv contrast , on iv rocephine , get nephrology and psych eval ,
·	Mild hyponatremia, improved. Advised her on the importance of fluid restriction.   ·	? Pyelonephritis  ·	? Urinary retention  ·	Depression  ·	Proteinuria    stable for discharge and needs frequent monitoring of lytes.
this is a 45 yo female , single no kids , with hx of chronic pain syndrome on multiple pain meds by pain management , , and neuropathy and diabetes and poor control and poor compliance diabetes and severe s recurrent major depression and anxiety disorder who follows with psych  and hx of multiple c spine , ls spine and abdominal surgeries and carpal tunnel syndrome and gyn surgeries for hysterectomy  , and recent mrsa post abdominal gyn surgery requiring home iv abx for 6-8 weeks , hx of laminectomy and hardware in ls spine and c spine , obesity , poor compliance with diabetes , hx of psych admission , p  patient presented to office yesterday and complain of abdominal pain , nausea , weakness , lethargy and poor po intake ,   in er anemia , lactae high , ua positive , sugar high and ct abdomen with ? pyelonephritis ,   admit control blood sugar and dm , educate , IV abx for ? kidney infection , probiotics , pain control ,   patient complain of some wheezing , will add nebs , also concerned about joint pain and will do arthritis profile , also concerned about pyelonehritis and will repeat ct with iv contrast , on iv rocephine , get nephrology and psych eval ,   on 7/24/18 patient had repeat ct abd which was negative , and also seen by nephro , and psych noted , plan admission to psych in am ,   as per psychiatry:     EXAM:  Vital Signs Last 24 Hrs  T(C): 36.9 (07-24-18 @ 05:14), Max: 37.1 (07-23-18 @ 20:35)  T(F): 98.5 (07-24-18 @ 05:14), Max: 98.7 (07-23-18 @ 20:35)  HR: 63 (07-24-18 @ 08:07) (63 - 74)  BP: 145/81 (07-24-18 @ 05:14) (129/64 - 164/83)  BP(mean): --  RR: 17 (07-24-18 @ 05:14) (17 - 17)  SpO2: 93% (07-24-18 @ 08:07) (93% - 100%)  Gen Appearance: Fair grooming and hygiene   Gait/Station/Muscle Tone: WNL  Abnl Movements: Absent  Speech: Normoproductive, relevant  TP; WNL  Associations: WNL  TC: Patient is expressing passive suicidal ideation  Mood: Depressed, tearful  Affect: labile  Consciousness/orientation: WNL  Memory:   Recent: WNL   Remote: WNL  Attention/Concentration: WNL  Language: WNL  Fund of Knowledge: Average  Insight: Fair  Judgment: Fair    Suicide Risk Assessment: At this time patient is unable to contract for safety, and requests inpatient psychiatric admission      DX: Adjustment disorder with anxiety and depression vs MDD, opioid dependence    REC: Continue current treatment, will admit to an inpatient psychiatric unit, once medically stable  Observation Status: Regular  Follow-up: As needed    Attending Attestation:   Plan discussed with Dr. Deleon.  plan transfer to psych unit for depression and safety issues , dc rocephine as no signs of pyelnephritis   place on lantus 30 nid , and per psychiatrist transfer to psych
this is a 47 yo female , single no kids , with hx of chronic pain syndrome on multiple pain meds by pain management , , and neuropathy and diabetes and poor control and poor compliance diabetes and severe s recurrent major depression and anxiety disorder who follows with psych  and hx of multiple c spine , ls spine and abdominal surgeries and carpal tunnel syndrome and gyn surgeries for hysterectomy  , and recent mrsa post abdominal gyn surgery requiring home iv abx for 6-8 weeks , hx of laminectomy and hardware in ls spine and c spine , obesity , poor compliance with diabetes , hx of psych admission , p  patient presented to office yesterday and complain of abdominal pain , nausea , weakness , lethargy and poor po intake ,   in er anemia , lactae high , ua positive , sugar high and ct abdomen with ? pyelonephritis ,   admit control blood sugar and dm , educate , IV abx for ? kidney infection , probiotics , pain control ,   patient complain of some wheezing , will add nebs , also concerned about joint pain and will do arthritis profile , also concerned about pyelonehritis and will repeat ct with iv contrast , on iv rocephine , get nephrology and psych eval ,   on 7/24/18 patient had repeat ct abd which was negative , and also seen by nephro , and psych noted , plan admission to psych in am ,   as per psychiatry:     EXAM:  Vital Signs Last 24 Hrs  T(C): 36.9 (07-24-18 @ 05:14), Max: 37.1 (07-23-18 @ 20:35)  T(F): 98.5 (07-24-18 @ 05:14), Max: 98.7 (07-23-18 @ 20:35)  HR: 63 (07-24-18 @ 08:07) (63 - 74)  BP: 145/81 (07-24-18 @ 05:14) (129/64 - 164/83)  BP(mean): --  RR: 17 (07-24-18 @ 05:14) (17 - 17)  SpO2: 93% (07-24-18 @ 08:07) (93% - 100%)  Gen Appearance: Fair grooming and hygiene   Gait/Station/Muscle Tone: WNL  Abnl Movements: Absent  Speech: Normoproductive, relevant  TP; WNL  Associations: WNL  TC: Patient is expressing passive suicidal ideation  Mood: Depressed, tearful  Affect: labile  Consciousness/orientation: WNL  Memory:   Recent: WNL   Remote: WNL  Attention/Concentration: WNL  Language: WNL  Fund of Knowledge: Average  Insight: Fair  Judgment: Fair    Suicide Risk Assessment: At this time patient is unable to contract for safety, and requests inpatient psychiatric admission      DX: Adjustment disorder with anxiety and depression vs MDD, opioid dependence    REC: Continue current treatment, will admit to an inpatient psychiatric unit, once medically stable  Observation Status: Regular  Follow-up: As needed    Attending Attestation:   Plan discussed with Dr. Deleon.  plan transfer to psych unit for depression and safety issues , dc rocephine as no signs of pyelnephritis   place on lantus 30 nid , and per psychiatrist transfer to psych

## 2018-07-26 NOTE — PROGRESS NOTE BEHAVIORAL HEALTH - SUMMARY
46 yo female with chronic pain and medical issues with limited primary and secondary supports presenting with low mood, ambivalence about her future who stated that she is unsafe at home and continues to request inpatient psychiatric admission. She signed voluntarily. 48 yo female with chronic pain and medical issues with limited primary and secondary supports presenting with low mood, ambivalence about her future who stated that she is unsafe at home due to recent suicide attempts and continues to request inpatient psychiatric admission she also admitted to recent crack/cocaine abuse. She signed voluntarily.

## 2018-07-27 LAB
CHOLEST SERPL-MCNC: 134 MG/DL — SIGNIFICANT CHANGE UP (ref 10–199)
GLUCOSE BLDC GLUCOMTR-MCNC: 115 MG/DL — HIGH (ref 70–99)
GLUCOSE BLDC GLUCOMTR-MCNC: 189 MG/DL — HIGH (ref 70–99)
GLUCOSE BLDC GLUCOMTR-MCNC: 201 MG/DL — HIGH (ref 70–99)
GLUCOSE BLDC GLUCOMTR-MCNC: 332 MG/DL — HIGH (ref 70–99)
HCT VFR BLD CALC: 29.7 % — LOW (ref 34.5–45)
HDLC SERPL-MCNC: 53 MG/DL — SIGNIFICANT CHANGE UP (ref 40–125)
HGB BLD-MCNC: 9.3 G/DL — LOW (ref 11.5–15.5)
IRON SATN MFR SERPL: 103 UG/DL — SIGNIFICANT CHANGE UP (ref 30–160)
IRON SATN MFR SERPL: 33 % — SIGNIFICANT CHANGE UP (ref 14–50)
LIPID PNL WITH DIRECT LDL SERPL: 55 MG/DL — SIGNIFICANT CHANGE UP
MCHC RBC-ENTMCNC: 19.6 PG — LOW (ref 27–34)
MCHC RBC-ENTMCNC: 31.3 GM/DL — LOW (ref 32–36)
MCV RBC AUTO: 62.5 FL — LOW (ref 80–100)
NRBC # BLD: 0 /100 WBCS — SIGNIFICANT CHANGE UP (ref 0–0)
PLATELET # BLD AUTO: 229 K/UL — SIGNIFICANT CHANGE UP (ref 150–400)
RBC # BLD: 4.75 M/UL — SIGNIFICANT CHANGE UP (ref 3.8–5.2)
RBC # FLD: 16.1 % — HIGH (ref 10.3–14.5)
RHEUMATOID FACTOR IDENTRA RESULT: SIGNIFICANT CHANGE UP
T PALLIDUM AB TITR SER: NEGATIVE — SIGNIFICANT CHANGE UP
TIBC SERPL-MCNC: 313 UG/DL — SIGNIFICANT CHANGE UP (ref 220–430)
TOTAL CHOLESTEROL/HDL RATIO MEASUREMENT: 2.5 RATIO — LOW (ref 3.3–7.1)
TRIGL SERPL-MCNC: 131 MG/DL — SIGNIFICANT CHANGE UP (ref 10–149)
UIBC SERPL-MCNC: 210 UG/DL — SIGNIFICANT CHANGE UP (ref 110–370)
WBC # BLD: 13.05 K/UL — HIGH (ref 3.8–10.5)
WBC # FLD AUTO: 13.05 K/UL — HIGH (ref 3.8–10.5)

## 2018-07-27 PROCEDURE — 99232 SBSQ HOSP IP/OBS MODERATE 35: CPT

## 2018-07-27 RX ORDER — LACTOBACILLUS ACIDOPHILUS 100MM CELL
1 CAPSULE ORAL
Refills: 0 | Status: DISCONTINUED | OUTPATIENT
Start: 2018-07-27 | End: 2018-08-14

## 2018-07-27 RX ORDER — CITALOPRAM 10 MG/1
20 TABLET, FILM COATED ORAL AT BEDTIME
Refills: 0 | Status: DISCONTINUED | OUTPATIENT
Start: 2018-07-28 | End: 2018-07-31

## 2018-07-27 RX ADMIN — OXYCODONE HYDROCHLORIDE 15 MILLIGRAM(S): 5 TABLET ORAL at 09:43

## 2018-07-27 RX ADMIN — Medication 50 MICROGRAM(S): at 06:56

## 2018-07-27 RX ADMIN — INSULIN GLARGINE 30 UNIT(S): 100 INJECTION, SOLUTION SUBCUTANEOUS at 23:05

## 2018-07-27 RX ADMIN — BUDESONIDE AND FORMOTEROL FUMARATE DIHYDRATE 2 PUFF(S): 160; 4.5 AEROSOL RESPIRATORY (INHALATION) at 06:56

## 2018-07-27 RX ADMIN — Medication 1 PATCH: at 09:00

## 2018-07-27 RX ADMIN — OXYCODONE HYDROCHLORIDE 15 MILLIGRAM(S): 5 TABLET ORAL at 13:57

## 2018-07-27 RX ADMIN — Medication 100 MILLIGRAM(S): at 21:06

## 2018-07-27 RX ADMIN — OXYCODONE HYDROCHLORIDE 15 MILLIGRAM(S): 5 TABLET ORAL at 21:05

## 2018-07-27 RX ADMIN — Medication 0.5 MILLIGRAM(S): at 21:06

## 2018-07-27 RX ADMIN — FAMOTIDINE 20 MILLIGRAM(S): 10 INJECTION INTRAVENOUS at 09:01

## 2018-07-27 RX ADMIN — Medication 0.5 MILLIGRAM(S): at 09:01

## 2018-07-27 RX ADMIN — Medication 8: at 12:16

## 2018-07-27 RX ADMIN — GABAPENTIN 600 MILLIGRAM(S): 400 CAPSULE ORAL at 09:00

## 2018-07-27 RX ADMIN — ATORVASTATIN CALCIUM 20 MILLIGRAM(S): 80 TABLET, FILM COATED ORAL at 21:11

## 2018-07-27 RX ADMIN — Medication 20 MILLIGRAM(S): at 09:01

## 2018-07-27 RX ADMIN — LISINOPRIL 2.5 MILLIGRAM(S): 2.5 TABLET ORAL at 09:01

## 2018-07-27 RX ADMIN — MIRTAZAPINE 30 MILLIGRAM(S): 45 TABLET, ORALLY DISINTEGRATING ORAL at 21:06

## 2018-07-27 RX ADMIN — Medication 81 MILLIGRAM(S): at 09:01

## 2018-07-27 RX ADMIN — Medication 1 TABLET(S): at 17:16

## 2018-07-27 RX ADMIN — Medication 10 UNIT(S): at 08:00

## 2018-07-27 RX ADMIN — GABAPENTIN 600 MILLIGRAM(S): 400 CAPSULE ORAL at 21:10

## 2018-07-27 RX ADMIN — FAMOTIDINE 20 MILLIGRAM(S): 10 INJECTION INTRAVENOUS at 21:06

## 2018-07-27 RX ADMIN — OXYCODONE HYDROCHLORIDE 15 MILLIGRAM(S): 5 TABLET ORAL at 13:30

## 2018-07-27 RX ADMIN — Medication 20 MILLIGRAM(S): at 21:11

## 2018-07-27 RX ADMIN — INSULIN GLARGINE 30 UNIT(S): 100 INJECTION, SOLUTION SUBCUTANEOUS at 08:02

## 2018-07-27 RX ADMIN — Medication 4: at 07:59

## 2018-07-27 RX ADMIN — Medication 10 UNIT(S): at 12:17

## 2018-07-27 RX ADMIN — Medication 10 UNIT(S): at 17:18

## 2018-07-27 RX ADMIN — OXYCODONE HYDROCHLORIDE 15 MILLIGRAM(S): 5 TABLET ORAL at 23:06

## 2018-07-27 RX ADMIN — BUDESONIDE AND FORMOTEROL FUMARATE DIHYDRATE 2 PUFF(S): 160; 4.5 AEROSOL RESPIRATORY (INHALATION) at 21:05

## 2018-07-27 RX ADMIN — MONTELUKAST 10 MILLIGRAM(S): 4 TABLET, CHEWABLE ORAL at 09:01

## 2018-07-27 RX ADMIN — Medication 0.5 MILLIGRAM(S): at 13:57

## 2018-07-27 RX ADMIN — CITALOPRAM 20 MILLIGRAM(S): 10 TABLET, FILM COATED ORAL at 09:01

## 2018-07-27 RX ADMIN — OXYCODONE HYDROCHLORIDE 15 MILLIGRAM(S): 5 TABLET ORAL at 09:01

## 2018-07-27 NOTE — PROGRESS NOTE BEHAVIORAL HEALTH - OTHER
limited to poor reports less depressed uncooperative at meals, as eats other patient's food, and is resistant to maintaining her consistent CHO diet neutral short haircut; multiple tattoos

## 2018-07-27 NOTE — PROGRESS NOTE BEHAVIORAL HEALTH - RISK ASSESSMENT
currently at elevated risk given inablity to contract for safety, reports feeling unsafe at home, chronic medical issues, single with limited social supports, hx of suicide attempts, and substance abuse

## 2018-07-27 NOTE — PROGRESS NOTE BEHAVIORAL HEALTH - NSBHFUPINTERVALHXFT_PSY_A_CORE
Patient seen and chart reviewed, case discussed in tx team meeting.  No significant interval events are reported.  Patient reports she continues depressed and anxious, but denies any SI or HI.  Asked that her Celexa be changed to hs, which was done.  Patient denies any SI and reports that she has reasons to live and wants to go on with her life.  She reports that she wants to improve her coping skills, and develop new coping skills so she does not get so upset about things.  No Rx SE are noted or reported.  Patient's blood glucose=332.  As Diabetes Educator in [place] is away, called Pat Weil, NP, Diabetes Educator in [facility], and discussed case with her, including that patient is eating other patient's food and is not adhering to her consistent CHO diet.  Ms. Weil had seen pt in [place], and feels we should continue to monitor patient's glucose, and keep her apprised of the situation.  She will follow up here as needed.  Patient teaching done re: need to eat only food sent for her, and staff is monitoring patient, but patient is resistant to following direction and maintaining her consistent CHO diet.  No Rx SE are noted or reported. Patient seen and chart reviewed, case discussed in tx team meeting.  No significant interval events are reported.  Patient reports she continues depressed and anxious, but denies any SI or HI.  Asked that her Celexa be changed to hs, which was done.  Patient denies any SI and reports that she has reasons to live and wants to go on with her life.  She reports that she wants to improve her coping skills, and develop new coping skills so she does not get so upset about things.  No Rx SE are noted or reported.  Patient's blood glucose=332.  As Diabetes Educator in [place] is away, called Pat Weil, NP, Diabetes Educator in [facility], and discussed case with her, including that patient is eating other patient's food and is not adhering to her consistent CHO diet.  Ms. Weil had seen pt in [place], and feels we should continue to monitor patient's glucose, and keep her apprised of the situation.  She will follow up here as needed.  Patient teaching done re: need to eat only food sent for her, and staff is monitoring patient, but patient is resistant to following direction and maintaining her consistent CHO diet.  No Rx SE are noted or reported. Called patient's outpatient provider, Dr. Miguel Broussard, at [facility] in [place] ([phone]) and left message for him to call back to provide collateral information. Patient seen and chart reviewed, case discussed in tx team meeting.  No significant interval events are reported.  Patient reports she continues depressed and anxious, but denies any SI or HI.  Asked that her Celexa be changed to hs, which was done.  Patient denies any SI and reports that she has reasons to live and wants to go on with her life.  She reports that she wants to improve her coping skills, and develop new coping skills so she does not get so upset about things.  No Rx SE are noted or reported.  Patient's blood glucose=332.  As Diabetes Educator in [place] is away, called Pat Weil, NP, Diabetes Educator in [facility], and discussed case with her, including that patient is eating other patient's food and is not adhering to her consistent CHO diet.  Ms. Weil had seen pt in [place], and feels we should continue to monitor patient's glucose, and keep her apprised of the situation.  She will follow up here as needed.  Patient teaching done re: need to eat only food sent for her, and staff is monitoring patient, but patient is resistant to following direction and maintaining her consistent CHO diet.  No Rx SE are noted or reported. Called patient's outpatient provider, Dr. Miguel Broussard, at [facility] in [place] ([phone]) and left message for him to call back to provide collateral information.  Patient c/o neurological issues, including hx of head injury.  Neuro consult ordered.  Patient seen by Dr. Moura.

## 2018-07-27 NOTE — PROGRESS NOTE BEHAVIORAL HEALTH - SUMMARY
46 yo female with chronic pain and medical issues with limited primary and secondary supports presenting with low mood, ambivalence about her future who stated that she is unsafe at home due to recent suicide attempts and continues to request inpatient psychiatric admission she also admitted to recent crack/cocaine abuse. She signed voluntarily. (admission info)   Patient adjusting to unit, non compliant with consistent CHO diet

## 2018-07-28 LAB
GLUCOSE BLDC GLUCOMTR-MCNC: 149 MG/DL — HIGH (ref 70–99)
GLUCOSE BLDC GLUCOMTR-MCNC: 153 MG/DL — HIGH (ref 70–99)
GLUCOSE BLDC GLUCOMTR-MCNC: 156 MG/DL — HIGH (ref 70–99)
GLUCOSE BLDC GLUCOMTR-MCNC: 212 MG/DL — HIGH (ref 70–99)
TSH SERPL-MCNC: 0.89 UIU/ML — SIGNIFICANT CHANGE UP (ref 0.27–4.2)

## 2018-07-28 RX ADMIN — Medication 1 PATCH: at 10:27

## 2018-07-28 RX ADMIN — OXYCODONE HYDROCHLORIDE 15 MILLIGRAM(S): 5 TABLET ORAL at 10:27

## 2018-07-28 RX ADMIN — CITALOPRAM 20 MILLIGRAM(S): 10 TABLET, FILM COATED ORAL at 21:12

## 2018-07-28 RX ADMIN — OXYCODONE HYDROCHLORIDE 15 MILLIGRAM(S): 5 TABLET ORAL at 12:30

## 2018-07-28 RX ADMIN — FAMOTIDINE 20 MILLIGRAM(S): 10 INJECTION INTRAVENOUS at 21:12

## 2018-07-28 RX ADMIN — BUDESONIDE AND FORMOTEROL FUMARATE DIHYDRATE 2 PUFF(S): 160; 4.5 AEROSOL RESPIRATORY (INHALATION) at 10:28

## 2018-07-28 RX ADMIN — Medication 0.5 MILLIGRAM(S): at 10:24

## 2018-07-28 RX ADMIN — OXYCODONE HYDROCHLORIDE 15 MILLIGRAM(S): 5 TABLET ORAL at 12:22

## 2018-07-28 RX ADMIN — ATORVASTATIN CALCIUM 20 MILLIGRAM(S): 80 TABLET, FILM COATED ORAL at 21:11

## 2018-07-28 RX ADMIN — INSULIN GLARGINE 30 UNIT(S): 100 INJECTION, SOLUTION SUBCUTANEOUS at 21:11

## 2018-07-28 RX ADMIN — Medication 10 UNIT(S): at 12:30

## 2018-07-28 RX ADMIN — OXYCODONE HYDROCHLORIDE 15 MILLIGRAM(S): 5 TABLET ORAL at 21:12

## 2018-07-28 RX ADMIN — Medication 20 MILLIGRAM(S): at 10:24

## 2018-07-28 RX ADMIN — Medication 10 UNIT(S): at 16:59

## 2018-07-28 RX ADMIN — Medication 4: at 08:29

## 2018-07-28 RX ADMIN — Medication 2: at 12:29

## 2018-07-28 RX ADMIN — Medication 2: at 16:58

## 2018-07-28 RX ADMIN — Medication 20 MILLIGRAM(S): at 21:12

## 2018-07-28 RX ADMIN — LISINOPRIL 2.5 MILLIGRAM(S): 2.5 TABLET ORAL at 10:28

## 2018-07-28 RX ADMIN — Medication 10 UNIT(S): at 08:25

## 2018-07-28 RX ADMIN — OXYCODONE HYDROCHLORIDE 15 MILLIGRAM(S): 5 TABLET ORAL at 10:28

## 2018-07-28 RX ADMIN — MONTELUKAST 10 MILLIGRAM(S): 4 TABLET, CHEWABLE ORAL at 10:27

## 2018-07-28 RX ADMIN — Medication 1 TABLET(S): at 10:26

## 2018-07-28 RX ADMIN — Medication 100 MILLIGRAM(S): at 21:12

## 2018-07-28 RX ADMIN — Medication 0.5 MILLIGRAM(S): at 12:31

## 2018-07-28 RX ADMIN — INSULIN GLARGINE 30 UNIT(S): 100 INJECTION, SOLUTION SUBCUTANEOUS at 08:16

## 2018-07-28 RX ADMIN — BUDESONIDE AND FORMOTEROL FUMARATE DIHYDRATE 2 PUFF(S): 160; 4.5 AEROSOL RESPIRATORY (INHALATION) at 21:11

## 2018-07-28 RX ADMIN — OXYCODONE HYDROCHLORIDE 15 MILLIGRAM(S): 5 TABLET ORAL at 22:08

## 2018-07-28 RX ADMIN — Medication 50 MICROGRAM(S): at 06:54

## 2018-07-28 RX ADMIN — GABAPENTIN 600 MILLIGRAM(S): 400 CAPSULE ORAL at 21:11

## 2018-07-28 RX ADMIN — GABAPENTIN 600 MILLIGRAM(S): 400 CAPSULE ORAL at 10:24

## 2018-07-28 RX ADMIN — Medication 1 TABLET(S): at 16:52

## 2018-07-28 RX ADMIN — Medication 81 MILLIGRAM(S): at 10:23

## 2018-07-28 RX ADMIN — Medication 0.5 MILLIGRAM(S): at 21:12

## 2018-07-28 RX ADMIN — MIRTAZAPINE 30 MILLIGRAM(S): 45 TABLET, ORALLY DISINTEGRATING ORAL at 21:12

## 2018-07-28 RX ADMIN — FAMOTIDINE 20 MILLIGRAM(S): 10 INJECTION INTRAVENOUS at 10:24

## 2018-07-28 NOTE — PROGRESS NOTE BEHAVIORAL HEALTH - NSBHFUPINTERVALHXFT_PSY_A_CORE
Patient seen and chart reviewed; she reports her current mood as "ok", and noted she was upset last night. She states she rather be called "Pam".  She denies current suicidal ideations, and noted "my life is going down the tube, but I don't want to die now; I tried before; it did not work". No significant interval events noted.  Patient reports she still experiences lots of anxiety, which is being treated with Klonopin with moderate effectiveness.  Nonetheless, she denies current  SI/HI/PI/AH/VH.  She spoke about her previous employment and states she hopes to get better so she can go back to work. She complains about back pain and verbalized relief after receiving her pain medication. No side effects of her medications reported or noted.

## 2018-07-28 NOTE — PROGRESS NOTE BEHAVIORAL HEALTH - OTHER
limited to poor neutral reports less depressed short haircut; multiple tattoos uncooperative at meals, as eats other patient's food, and is resistant to maintaining her consistent CHO diet

## 2018-07-29 LAB
GLUCOSE BLDC GLUCOMTR-MCNC: 117 MG/DL — HIGH (ref 70–99)
GLUCOSE BLDC GLUCOMTR-MCNC: 123 MG/DL — HIGH (ref 70–99)
GLUCOSE BLDC GLUCOMTR-MCNC: 180 MG/DL — HIGH (ref 70–99)
GLUCOSE BLDC GLUCOMTR-MCNC: 190 MG/DL — HIGH (ref 70–99)

## 2018-07-29 PROCEDURE — 99232 SBSQ HOSP IP/OBS MODERATE 35: CPT

## 2018-07-29 RX ADMIN — Medication 20 MILLIGRAM(S): at 09:14

## 2018-07-29 RX ADMIN — BUDESONIDE AND FORMOTEROL FUMARATE DIHYDRATE 2 PUFF(S): 160; 4.5 AEROSOL RESPIRATORY (INHALATION) at 09:13

## 2018-07-29 RX ADMIN — INSULIN GLARGINE 30 UNIT(S): 100 INJECTION, SOLUTION SUBCUTANEOUS at 07:55

## 2018-07-29 RX ADMIN — Medication 10 UNIT(S): at 12:19

## 2018-07-29 RX ADMIN — GABAPENTIN 600 MILLIGRAM(S): 400 CAPSULE ORAL at 09:13

## 2018-07-29 RX ADMIN — ATORVASTATIN CALCIUM 20 MILLIGRAM(S): 80 TABLET, FILM COATED ORAL at 21:02

## 2018-07-29 RX ADMIN — MONTELUKAST 10 MILLIGRAM(S): 4 TABLET, CHEWABLE ORAL at 21:03

## 2018-07-29 RX ADMIN — Medication 30 MILLILITER(S): at 16:34

## 2018-07-29 RX ADMIN — CITALOPRAM 20 MILLIGRAM(S): 10 TABLET, FILM COATED ORAL at 21:03

## 2018-07-29 RX ADMIN — Medication 1 PATCH: at 09:13

## 2018-07-29 RX ADMIN — Medication 10 UNIT(S): at 07:56

## 2018-07-29 RX ADMIN — Medication 0.5 MILLIGRAM(S): at 09:14

## 2018-07-29 RX ADMIN — OXYCODONE HYDROCHLORIDE 15 MILLIGRAM(S): 5 TABLET ORAL at 21:03

## 2018-07-29 RX ADMIN — GABAPENTIN 600 MILLIGRAM(S): 400 CAPSULE ORAL at 21:02

## 2018-07-29 RX ADMIN — Medication 50 MICROGRAM(S): at 06:53

## 2018-07-29 RX ADMIN — Medication 0.5 MILLIGRAM(S): at 21:03

## 2018-07-29 RX ADMIN — OXYCODONE HYDROCHLORIDE 15 MILLIGRAM(S): 5 TABLET ORAL at 09:13

## 2018-07-29 RX ADMIN — Medication 1 PATCH: at 12:11

## 2018-07-29 RX ADMIN — OXYCODONE HYDROCHLORIDE 15 MILLIGRAM(S): 5 TABLET ORAL at 12:15

## 2018-07-29 RX ADMIN — LISINOPRIL 2.5 MILLIGRAM(S): 2.5 TABLET ORAL at 09:14

## 2018-07-29 RX ADMIN — Medication 30 MILLILITER(S): at 21:01

## 2018-07-29 RX ADMIN — FAMOTIDINE 20 MILLIGRAM(S): 10 INJECTION INTRAVENOUS at 09:14

## 2018-07-29 RX ADMIN — Medication 100 MILLIGRAM(S): at 21:02

## 2018-07-29 RX ADMIN — BUDESONIDE AND FORMOTEROL FUMARATE DIHYDRATE 2 PUFF(S): 160; 4.5 AEROSOL RESPIRATORY (INHALATION) at 21:02

## 2018-07-29 RX ADMIN — Medication 2: at 07:55

## 2018-07-29 RX ADMIN — INSULIN GLARGINE 30 UNIT(S): 100 INJECTION, SOLUTION SUBCUTANEOUS at 21:00

## 2018-07-29 RX ADMIN — OXYCODONE HYDROCHLORIDE 15 MILLIGRAM(S): 5 TABLET ORAL at 22:03

## 2018-07-29 RX ADMIN — Medication 1 TABLET(S): at 17:11

## 2018-07-29 RX ADMIN — Medication 81 MILLIGRAM(S): at 09:14

## 2018-07-29 RX ADMIN — Medication 10 UNIT(S): at 17:12

## 2018-07-29 RX ADMIN — Medication 0.5 MILLIGRAM(S): at 15:14

## 2018-07-29 RX ADMIN — MIRTAZAPINE 30 MILLIGRAM(S): 45 TABLET, ORALLY DISINTEGRATING ORAL at 21:03

## 2018-07-29 RX ADMIN — Medication 1 TABLET(S): at 09:13

## 2018-07-29 RX ADMIN — Medication 20 MILLIGRAM(S): at 21:03

## 2018-07-29 RX ADMIN — FAMOTIDINE 20 MILLIGRAM(S): 10 INJECTION INTRAVENOUS at 21:02

## 2018-07-29 RX ADMIN — OXYCODONE HYDROCHLORIDE 15 MILLIGRAM(S): 5 TABLET ORAL at 15:14

## 2018-07-29 RX ADMIN — OXYCODONE HYDROCHLORIDE 15 MILLIGRAM(S): 5 TABLET ORAL at 17:12

## 2018-07-29 NOTE — PROGRESS NOTE ADULT - SUBJECTIVE AND OBJECTIVE BOX
Neurology follow up note    OBED [id number]      Interval History:    Patient feels ok no new complaints.    MEDICATIONS    ALBUTerol/ipratropium for Nebulization. 3 milliLiter(s) Nebulizer once  aspirin  chewable 81 milliGRAM(s) Oral daily  atorvastatin 20 milliGRAM(s) Oral at bedtime  buDESOnide  80 MICROgram(s)/formoterol 4.5 MICROgram(s) Inhaler 2 Puff(s) Inhalation two times a day  citalopram 20 milliGRAM(s) Oral at bedtime  clonazePAM Tablet 0.5 milliGRAM(s) Oral three times a day  dextrose 40% Gel 15 Gram(s) Oral once PRN  dextrose 5%. 1000 milliLiter(s) IV Continuous <Continuous>  dextrose 50% Injectable 12.5 Gram(s) IV Push once  dextrose 50% Injectable 25 Gram(s) IV Push once  dextrose 50% Injectable 25 Gram(s) IV Push once  famotidine    Tablet 20 milliGRAM(s) Oral two times a day  furosemide    Tablet 20 milliGRAM(s) Oral two times a day  gabapentin 600 milliGRAM(s) Oral two times a day  glucagon  Injectable 1 milliGRAM(s) IntraMuscular once PRN  insulin glargine Injectable (LANTUS) 30 Unit(s) SubCutaneous two times a day  insulin lispro (HumaLOG) corrective regimen sliding scale   SubCutaneous three times a day before meals  insulin lispro Injectable (HumaLOG) 10 Unit(s) SubCutaneous three times a day before meals  lactobacillus acidophilus 1 Tablet(s) Oral two times a day with meals  levothyroxine 50 MICROGram(s) Oral daily  lisinopril 2.5 milliGRAM(s) Oral daily  LORazepam   Injectable 2 milliGRAM(s) IntraMuscular every 6 hours PRN  mirtazapine 30 milliGRAM(s) Oral at bedtime  montelukast 10 milliGRAM(s) Oral daily  nicotine -  14 mG/24Hr(s) Patch 1 patch Transdermal daily  oxyCODONE  ER Tablet 15 milliGRAM(s) Oral three times a day  traZODone 100 milliGRAM(s) Oral at bedtime      Allergies    apple (Unknown)  iodine (Unknown)  Peaches (Unknown)  raw fruits and vetables (Unknown)    Intolerances            Vital Signs Last 24 Hrs  T(C): --  T(F): --  HR: --  BP: --  BP(mean): --  RR: --  SpO2: --      REVIEW OF SYSTEMS:    Constitutional: No fever, chills, fatigue, weakness  Eyes: no eye pain, visual disturbances, or discharge  ENT:  No difficulty hearing, tinnitus, vertigo; No sinus or throat pain  Neck: No pain or stiffness  Respiratory: No cough, dyspnea, wheezing   Cardiovascular: No chest pain, palpitations,   Gastrointestinal: No abdominal or epigastric pain. No nausea, vomiting  No diarrhea or constipation.   Genitourinary: No dysuria, frequency, hematuria or incontinence  Neurological: No headaches, lightheadedness, vertigo, numbness or tremors  Psychiatric: No depression, anxiety, mood swings or difficulty sleeping  Musculoskeletal: H/O of extremity pain  Skin: No itching, burning, rashes or lesions   Lymph Nodes: No enlarged glands  Endocrine: No heat or cold intolerance; No hair loss   Allergy and Immunologic: No hives or eczema    On Neurological Examination:   The patient is awake and alert.    Location was [facility], year was 2018, and month was July.  Recall was 2/3 without prompting and 3/3 with prompting.  Was able to name simple objects.     Extraocular movements were intact.      Pupils were equal, round, and reactive bilaterally, 3 mm to 2 mm.      Speech was fluent.      Smile was symmetric.      Motor:  Bilateral upper and lower were 4/5.  Sensory:  Bilateral upper and lower are intact to light touch.      Gait:  The patient used a walker to walk with.    Follow simple commands    GENERAL Exam: Nontoxic , No Acute Distress   	  HEENT:  normocephalic, atraumatic  		  LUNGS: Clear bilaterally  No Wheeze  Decreased bilaterally  	  HEART: Normal S1S2   No murmur RRR        	  GI/ ABDOMEN:  Soft  Non tender    EXTREMITIES:   No Edema  No Clubbing  No Cyanosis No Edema    MUSCULOSKELETAL: decreased Range of Motion all 4 extremities   	   SKIN: Normal  No Ecchymosis               LABS:            Hemoglobin A1C:       Vitamin B12         RADIOLOGY    ANALYSIS AND PLAN:  This is a 47-year-old with a history of forgetfulness, memory issues, head trauma, spinal disc disease, and paresthesias.  1.	For history of memory issues, questionable this could be secondary to pseudodementia secondary to depression versus possible history of head trauma leading to postconcussion syndrome.  I do not feel that this is a from a new cerebrovascular accident.  2.	For underlying depression, I would recommend to continue the patient on psychiatric treatment.  3.	For history of spinal disc disease, continue the patient on her current medications with gabapentin.  The patient states that she needs to use a stimulator.  4.	For history of paresthesias, most likely secondary to diabetes and spinal disc disease with peripheral neuropathy, continue the patient on gabapentin.  5.	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  6.	From Neurology standpoint, the patient is stable.    Thank you for the courtesy of this consultation.      Greater than 45 minutes spent in direct patient care reviewing  the notes, lab data/ imaging , discussion with multidisciplinary team.

## 2018-07-29 NOTE — PROGRESS NOTE BEHAVIORAL HEALTH - SUMMARY
48 yo female with chronic pain and medical issues with limited primary and secondary supports presenting with low mood, ambivalence about her future who stated that she is unsafe at home due to recent suicide attempts and continues to request inpatient psychiatric admission she also admitted to recent crack/cocaine abuse. She signed voluntarily.

## 2018-07-29 NOTE — PROGRESS NOTE BEHAVIORAL HEALTH - NSBHFUPINTERVALHXFT_PSY_A_CORE
Adjusting to the Unit well, + care seeking behavior throughout the day (spends significant time at nurses station with different requests). Does not looks sad or depressed; manifesting string Axis II Borderline pathology.

## 2018-07-29 NOTE — PROGRESS NOTE BEHAVIORAL HEALTH - OTHER
uncooperative at meals, as eats other patient's food, and is resistant to maintaining her consistent CHO diet short haircut; multiple tattoos reports less depressed limited to poor neutral

## 2018-07-30 LAB
ANION GAP SERPL CALC-SCNC: 10 MMOL/L — SIGNIFICANT CHANGE UP (ref 5–17)
BUN SERPL-MCNC: 31 MG/DL — HIGH (ref 7–23)
CALCIUM SERPL-MCNC: 8.3 MG/DL — LOW (ref 8.4–10.5)
CHLORIDE SERPL-SCNC: 104 MMOL/L — SIGNIFICANT CHANGE UP (ref 96–108)
CO2 SERPL-SCNC: 24 MMOL/L — SIGNIFICANT CHANGE UP (ref 22–31)
CREAT SERPL-MCNC: 0.98 MG/DL — SIGNIFICANT CHANGE UP (ref 0.5–1.3)
GLUCOSE BLDC GLUCOMTR-MCNC: 128 MG/DL — HIGH (ref 70–99)
GLUCOSE BLDC GLUCOMTR-MCNC: 129 MG/DL — HIGH (ref 70–99)
GLUCOSE BLDC GLUCOMTR-MCNC: 151 MG/DL — HIGH (ref 70–99)
GLUCOSE BLDC GLUCOMTR-MCNC: 239 MG/DL — HIGH (ref 70–99)
GLUCOSE SERPL-MCNC: 236 MG/DL — HIGH (ref 70–99)
POTASSIUM SERPL-MCNC: 4.3 MMOL/L — SIGNIFICANT CHANGE UP (ref 3.5–5.3)
POTASSIUM SERPL-SCNC: 4.3 MMOL/L — SIGNIFICANT CHANGE UP (ref 3.5–5.3)
SODIUM SERPL-SCNC: 138 MMOL/L — SIGNIFICANT CHANGE UP (ref 135–145)

## 2018-07-30 PROCEDURE — 99232 SBSQ HOSP IP/OBS MODERATE 35: CPT

## 2018-07-30 RX ORDER — FERROUS SULFATE 325(65) MG
325 TABLET ORAL DAILY
Refills: 0 | Status: DISCONTINUED | OUTPATIENT
Start: 2018-07-30 | End: 2018-08-03

## 2018-07-30 RX ORDER — LOPERAMIDE HCL 2 MG
2 TABLET ORAL THREE TIMES A DAY
Refills: 0 | Status: DISCONTINUED | OUTPATIENT
Start: 2018-07-30 | End: 2018-08-14

## 2018-07-30 RX ADMIN — FAMOTIDINE 20 MILLIGRAM(S): 10 INJECTION INTRAVENOUS at 20:35

## 2018-07-30 RX ADMIN — Medication 4: at 12:26

## 2018-07-30 RX ADMIN — FAMOTIDINE 20 MILLIGRAM(S): 10 INJECTION INTRAVENOUS at 08:37

## 2018-07-30 RX ADMIN — BUDESONIDE AND FORMOTEROL FUMARATE DIHYDRATE 2 PUFF(S): 160; 4.5 AEROSOL RESPIRATORY (INHALATION) at 07:53

## 2018-07-30 RX ADMIN — OXYCODONE HYDROCHLORIDE 15 MILLIGRAM(S): 5 TABLET ORAL at 16:27

## 2018-07-30 RX ADMIN — Medication 100 MILLIGRAM(S): at 20:35

## 2018-07-30 RX ADMIN — OXYCODONE HYDROCHLORIDE 15 MILLIGRAM(S): 5 TABLET ORAL at 12:28

## 2018-07-30 RX ADMIN — ATORVASTATIN CALCIUM 20 MILLIGRAM(S): 80 TABLET, FILM COATED ORAL at 20:34

## 2018-07-30 RX ADMIN — OXYCODONE HYDROCHLORIDE 15 MILLIGRAM(S): 5 TABLET ORAL at 20:34

## 2018-07-30 RX ADMIN — Medication 2: at 07:56

## 2018-07-30 RX ADMIN — MIRTAZAPINE 30 MILLIGRAM(S): 45 TABLET, ORALLY DISINTEGRATING ORAL at 20:35

## 2018-07-30 RX ADMIN — Medication 2 MILLIGRAM(S): at 13:54

## 2018-07-30 RX ADMIN — LISINOPRIL 2.5 MILLIGRAM(S): 2.5 TABLET ORAL at 08:36

## 2018-07-30 RX ADMIN — Medication 0.5 MILLIGRAM(S): at 08:36

## 2018-07-30 RX ADMIN — Medication 81 MILLIGRAM(S): at 08:36

## 2018-07-30 RX ADMIN — Medication 30 MILLILITER(S): at 08:40

## 2018-07-30 RX ADMIN — OXYCODONE HYDROCHLORIDE 15 MILLIGRAM(S): 5 TABLET ORAL at 21:07

## 2018-07-30 RX ADMIN — Medication 1 TABLET(S): at 07:53

## 2018-07-30 RX ADMIN — GABAPENTIN 600 MILLIGRAM(S): 400 CAPSULE ORAL at 08:37

## 2018-07-30 RX ADMIN — Medication 1 PATCH: at 08:35

## 2018-07-30 RX ADMIN — INSULIN GLARGINE 30 UNIT(S): 100 INJECTION, SOLUTION SUBCUTANEOUS at 20:35

## 2018-07-30 RX ADMIN — GABAPENTIN 600 MILLIGRAM(S): 400 CAPSULE ORAL at 20:38

## 2018-07-30 RX ADMIN — Medication 1 TABLET(S): at 17:01

## 2018-07-30 RX ADMIN — Medication 10 UNIT(S): at 12:27

## 2018-07-30 RX ADMIN — OXYCODONE HYDROCHLORIDE 15 MILLIGRAM(S): 5 TABLET ORAL at 08:37

## 2018-07-30 RX ADMIN — OXYCODONE HYDROCHLORIDE 15 MILLIGRAM(S): 5 TABLET ORAL at 13:54

## 2018-07-30 RX ADMIN — MONTELUKAST 10 MILLIGRAM(S): 4 TABLET, CHEWABLE ORAL at 21:06

## 2018-07-30 RX ADMIN — Medication 0.5 MILLIGRAM(S): at 20:34

## 2018-07-30 RX ADMIN — Medication 20 MILLIGRAM(S): at 20:35

## 2018-07-30 RX ADMIN — Medication 0.5 MILLIGRAM(S): at 13:54

## 2018-07-30 RX ADMIN — INSULIN GLARGINE 30 UNIT(S): 100 INJECTION, SOLUTION SUBCUTANEOUS at 07:55

## 2018-07-30 RX ADMIN — Medication 10 UNIT(S): at 07:56

## 2018-07-30 RX ADMIN — CITALOPRAM 20 MILLIGRAM(S): 10 TABLET, FILM COATED ORAL at 20:35

## 2018-07-30 RX ADMIN — Medication 50 MICROGRAM(S): at 06:57

## 2018-07-30 RX ADMIN — Medication 20 MILLIGRAM(S): at 08:37

## 2018-07-30 RX ADMIN — Medication 1 PATCH: at 08:40

## 2018-07-30 RX ADMIN — Medication 10 UNIT(S): at 17:01

## 2018-07-30 NOTE — PROGRESS NOTE BEHAVIORAL HEALTH - RISK ASSESSMENT
Denies current SI or HI (but not sure if she would harm self at home)  Risk factors: mood episode, impulsive behavior, chronic pain, access to means, hx of suicide attempts   Protective factors:  IDs reasons to live, supportive family, future oriented, positive therapeutic relationships, lives with family

## 2018-07-30 NOTE — PROGRESS NOTE BEHAVIORAL HEALTH - OTHER
reports less depressed limited to poor short haircut; multiple tattoos neutral uncooperative at meals, as eats other patient's food, and is resistant to maintaining her consistent CHO diet

## 2018-07-30 NOTE — PROGRESS NOTE BEHAVIORAL HEALTH - NSBHFUPINTERVALHXFT_PSY_A_CORE
Patient seen, chart reviewed and case discussed with staff.  No significant interval events reported Adjusting to the Unit well, + care seeking behavior throughout the day (spends significant time at nurses station with different requests). Denies any current SI, but reports she could kill herself at home.  Reports if she has SI on the unit she will tell staff.  No Rx SE are noted or reported.  Reports had prn Pepto Bismol for diarrhea.  for BMP in AM on7/31 Patient seen, chart reviewed and case discussed with staff.  No significant interval events reported Adjusting to the Unit well, + care seeking behavior throughout the day (spends significant time at nurses station with different requests). Denies any current SI, but reports she could kill herself at home.  Reports if she has SI on the unit she will tell staff.  No Rx SE are noted or reported.  Reports had prn Pepto Bismol for diarrhea.  for BMP in AM on7/31   Neuro consult was done, and patient is neurologically stable.

## 2018-07-30 NOTE — PROGRESS NOTE BEHAVIORAL HEALTH - SUMMARY
46 yo female with chronic pain and medical issues with limited primary and secondary supports presenting with low mood, ambivalence about her future who stated that she is unsafe at home due to recent suicide attempts and continues to request inpatient psychiatric admission she also admitted to recent crack/cocaine abuse. She signed voluntarily. (admission info)  Patient adjusting to unit.  Denies current SI but reports not sure what she would do at home.

## 2018-07-31 LAB
GLUCOSE BLDC GLUCOMTR-MCNC: 121 MG/DL — HIGH (ref 70–99)
GLUCOSE BLDC GLUCOMTR-MCNC: 128 MG/DL — HIGH (ref 70–99)
GLUCOSE BLDC GLUCOMTR-MCNC: 149 MG/DL — HIGH (ref 70–99)
GLUCOSE BLDC GLUCOMTR-MCNC: 170 MG/DL — HIGH (ref 70–99)
HBA1C BLD-MCNC: 10.3 % — HIGH (ref 4–5.6)
HCT VFR BLD CALC: 28.5 % — LOW (ref 34.5–45)
HGB BLD-MCNC: 8.9 G/DL — LOW (ref 11.5–15.5)
MCHC RBC-ENTMCNC: 19.7 PG — LOW (ref 27–34)
MCHC RBC-ENTMCNC: 31.2 GM/DL — LOW (ref 32–36)
MCV RBC AUTO: 63.1 FL — LOW (ref 80–100)
NRBC # BLD: 0 /100 WBCS — SIGNIFICANT CHANGE UP (ref 0–0)
PLATELET # BLD AUTO: 245 K/UL — SIGNIFICANT CHANGE UP (ref 150–400)
RBC # BLD: 4.52 M/UL — SIGNIFICANT CHANGE UP (ref 3.8–5.2)
RBC # FLD: 16.2 % — HIGH (ref 10.3–14.5)
WBC # BLD: 11 K/UL — HIGH (ref 3.8–10.5)
WBC # FLD AUTO: 11 K/UL — HIGH (ref 3.8–10.5)

## 2018-07-31 PROCEDURE — 99232 SBSQ HOSP IP/OBS MODERATE 35: CPT

## 2018-07-31 RX ORDER — CITALOPRAM 10 MG/1
40 TABLET, FILM COATED ORAL AT BEDTIME
Refills: 0 | Status: DISCONTINUED | OUTPATIENT
Start: 2018-07-31 | End: 2018-08-14

## 2018-07-31 RX ORDER — ARIPIPRAZOLE 15 MG/1
5 TABLET ORAL DAILY
Refills: 0 | Status: DISCONTINUED | OUTPATIENT
Start: 2018-08-01 | End: 2018-08-07

## 2018-07-31 RX ORDER — ARIPIPRAZOLE 15 MG/1
5 TABLET ORAL ONCE
Refills: 0 | Status: COMPLETED | OUTPATIENT
Start: 2018-07-31 | End: 2018-07-31

## 2018-07-31 RX ADMIN — BUDESONIDE AND FORMOTEROL FUMARATE DIHYDRATE 2 PUFF(S): 160; 4.5 AEROSOL RESPIRATORY (INHALATION) at 22:15

## 2018-07-31 RX ADMIN — Medication 20 MILLIGRAM(S): at 21:09

## 2018-07-31 RX ADMIN — Medication 325 MILLIGRAM(S): at 08:39

## 2018-07-31 RX ADMIN — Medication 20 MILLIGRAM(S): at 08:39

## 2018-07-31 RX ADMIN — BUDESONIDE AND FORMOTEROL FUMARATE DIHYDRATE 2 PUFF(S): 160; 4.5 AEROSOL RESPIRATORY (INHALATION) at 08:39

## 2018-07-31 RX ADMIN — OXYCODONE HYDROCHLORIDE 15 MILLIGRAM(S): 5 TABLET ORAL at 16:20

## 2018-07-31 RX ADMIN — INSULIN GLARGINE 30 UNIT(S): 100 INJECTION, SOLUTION SUBCUTANEOUS at 08:03

## 2018-07-31 RX ADMIN — Medication 100 MILLIGRAM(S): at 21:09

## 2018-07-31 RX ADMIN — MONTELUKAST 10 MILLIGRAM(S): 4 TABLET, CHEWABLE ORAL at 21:09

## 2018-07-31 RX ADMIN — Medication 50 MICROGRAM(S): at 06:12

## 2018-07-31 RX ADMIN — INSULIN GLARGINE 30 UNIT(S): 100 INJECTION, SOLUTION SUBCUTANEOUS at 21:10

## 2018-07-31 RX ADMIN — Medication 0.5 MILLIGRAM(S): at 08:39

## 2018-07-31 RX ADMIN — Medication 0.5 MILLIGRAM(S): at 21:09

## 2018-07-31 RX ADMIN — Medication 10 UNIT(S): at 17:13

## 2018-07-31 RX ADMIN — OXYCODONE HYDROCHLORIDE 15 MILLIGRAM(S): 5 TABLET ORAL at 22:15

## 2018-07-31 RX ADMIN — CITALOPRAM 40 MILLIGRAM(S): 10 TABLET, FILM COATED ORAL at 21:09

## 2018-07-31 RX ADMIN — GABAPENTIN 600 MILLIGRAM(S): 400 CAPSULE ORAL at 21:09

## 2018-07-31 RX ADMIN — Medication 1 TABLET(S): at 17:13

## 2018-07-31 RX ADMIN — Medication 0.5 MILLIGRAM(S): at 12:12

## 2018-07-31 RX ADMIN — OXYCODONE HYDROCHLORIDE 15 MILLIGRAM(S): 5 TABLET ORAL at 12:12

## 2018-07-31 RX ADMIN — Medication 81 MILLIGRAM(S): at 08:38

## 2018-07-31 RX ADMIN — Medication 1 PATCH: at 08:40

## 2018-07-31 RX ADMIN — MIRTAZAPINE 30 MILLIGRAM(S): 45 TABLET, ORALLY DISINTEGRATING ORAL at 21:09

## 2018-07-31 RX ADMIN — Medication 1 PATCH: at 08:38

## 2018-07-31 RX ADMIN — ARIPIPRAZOLE 5 MILLIGRAM(S): 15 TABLET ORAL at 13:33

## 2018-07-31 RX ADMIN — LISINOPRIL 2.5 MILLIGRAM(S): 2.5 TABLET ORAL at 08:39

## 2018-07-31 RX ADMIN — Medication 2 MILLIGRAM(S): at 03:25

## 2018-07-31 RX ADMIN — OXYCODONE HYDROCHLORIDE 15 MILLIGRAM(S): 5 TABLET ORAL at 21:09

## 2018-07-31 RX ADMIN — Medication 1 TABLET(S): at 07:59

## 2018-07-31 RX ADMIN — GABAPENTIN 600 MILLIGRAM(S): 400 CAPSULE ORAL at 08:38

## 2018-07-31 RX ADMIN — ATORVASTATIN CALCIUM 20 MILLIGRAM(S): 80 TABLET, FILM COATED ORAL at 21:09

## 2018-07-31 RX ADMIN — FAMOTIDINE 20 MILLIGRAM(S): 10 INJECTION INTRAVENOUS at 08:38

## 2018-07-31 RX ADMIN — OXYCODONE HYDROCHLORIDE 15 MILLIGRAM(S): 5 TABLET ORAL at 08:38

## 2018-07-31 RX ADMIN — Medication 10 UNIT(S): at 12:05

## 2018-07-31 RX ADMIN — Medication 2: at 08:04

## 2018-07-31 RX ADMIN — FAMOTIDINE 20 MILLIGRAM(S): 10 INJECTION INTRAVENOUS at 21:09

## 2018-07-31 RX ADMIN — Medication 10 UNIT(S): at 08:04

## 2018-07-31 NOTE — PROGRESS NOTE BEHAVIORAL HEALTH - NSBHFUPINTERVALHXFT_PSY_A_CORE
Patient seen, chart reviewed and case discussed with staff.  No significant interval events reported Adjusting to the Unit well, + care seeking behavior throughout the day (spends significant time at nurses station with different requests). Denies any current SI, but reports she could kill herself at home, but is not sure if she would do so.   Reports if she has SI on the unit she will tell staff.  No Rx SE are noted or reported.  Reports she feels depressed, and has been on Celexa 40mg in the past, which helped her.  Reports was on Abilify 5mg in the past which helped her as well.  Increase Celexa to 40mg q am, and begin Abilify 5mg q am.

## 2018-07-31 NOTE — PROGRESS NOTE BEHAVIORAL HEALTH - OTHER
limited to poor neutral uncooperative at meals, as eats other patient's food, and is resistant to maintaining her consistent CHO diet reports depressed short haircut; multiple tattoos

## 2018-07-31 NOTE — PROGRESS NOTE BEHAVIORAL HEALTH - SUMMARY
46 yo female with chronic pain and medical issues with limited primary and secondary supports presenting with low mood, ambivalence about her future who stated that she is unsafe at home due to recent suicide attempts and continues to request inpatient psychiatric admission she also admitted to recent crack/cocaine abuse. She signed voluntarily. (admission info)  Patient adjusting to unit.  Denies current SI but reports not sure what she would do at home.  Reports depressed, Celexa increased to 40mg and Abilify 5mg qam begun.

## 2018-08-01 LAB
GLUCOSE BLDC GLUCOMTR-MCNC: 135 MG/DL — HIGH (ref 70–99)
GLUCOSE BLDC GLUCOMTR-MCNC: 139 MG/DL — HIGH (ref 70–99)
GLUCOSE BLDC GLUCOMTR-MCNC: 154 MG/DL — HIGH (ref 70–99)
GLUCOSE BLDC GLUCOMTR-MCNC: 197 MG/DL — HIGH (ref 70–99)

## 2018-08-01 RX ORDER — HYDROXYZINE HCL 10 MG
25 TABLET ORAL EVERY 6 HOURS
Refills: 0 | Status: DISCONTINUED | OUTPATIENT
Start: 2018-08-01 | End: 2018-08-02

## 2018-08-01 RX ORDER — CLONAZEPAM 1 MG
0.5 TABLET ORAL THREE TIMES A DAY
Refills: 0 | Status: DISCONTINUED | OUTPATIENT
Start: 2018-08-01 | End: 2018-08-02

## 2018-08-01 RX ORDER — NYSTATIN CREAM 100000 [USP'U]/G
1 CREAM TOPICAL
Refills: 0 | Status: DISCONTINUED | OUTPATIENT
Start: 2018-08-01 | End: 2018-08-14

## 2018-08-01 RX ADMIN — Medication 325 MILLIGRAM(S): at 09:08

## 2018-08-01 RX ADMIN — OXYCODONE HYDROCHLORIDE 15 MILLIGRAM(S): 5 TABLET ORAL at 21:31

## 2018-08-01 RX ADMIN — OXYCODONE HYDROCHLORIDE 15 MILLIGRAM(S): 5 TABLET ORAL at 13:26

## 2018-08-01 RX ADMIN — Medication 10 UNIT(S): at 08:02

## 2018-08-01 RX ADMIN — Medication 0.5 MILLIGRAM(S): at 21:21

## 2018-08-01 RX ADMIN — OXYCODONE HYDROCHLORIDE 15 MILLIGRAM(S): 5 TABLET ORAL at 13:30

## 2018-08-01 RX ADMIN — Medication 20 MILLIGRAM(S): at 21:21

## 2018-08-01 RX ADMIN — OXYCODONE HYDROCHLORIDE 15 MILLIGRAM(S): 5 TABLET ORAL at 09:38

## 2018-08-01 RX ADMIN — LISINOPRIL 2.5 MILLIGRAM(S): 2.5 TABLET ORAL at 09:09

## 2018-08-01 RX ADMIN — MONTELUKAST 10 MILLIGRAM(S): 4 TABLET, CHEWABLE ORAL at 21:31

## 2018-08-01 RX ADMIN — Medication 1 PATCH: at 09:37

## 2018-08-01 RX ADMIN — OXYCODONE HYDROCHLORIDE 15 MILLIGRAM(S): 5 TABLET ORAL at 09:09

## 2018-08-01 RX ADMIN — CITALOPRAM 40 MILLIGRAM(S): 10 TABLET, FILM COATED ORAL at 21:21

## 2018-08-01 RX ADMIN — Medication 1 TABLET(S): at 17:10

## 2018-08-01 RX ADMIN — Medication 100 MILLIGRAM(S): at 21:21

## 2018-08-01 RX ADMIN — INSULIN GLARGINE 30 UNIT(S): 100 INJECTION, SOLUTION SUBCUTANEOUS at 21:20

## 2018-08-01 RX ADMIN — ARIPIPRAZOLE 5 MILLIGRAM(S): 15 TABLET ORAL at 09:09

## 2018-08-01 RX ADMIN — Medication 1 TABLET(S): at 09:08

## 2018-08-01 RX ADMIN — Medication 10 UNIT(S): at 17:10

## 2018-08-01 RX ADMIN — GABAPENTIN 600 MILLIGRAM(S): 400 CAPSULE ORAL at 09:08

## 2018-08-01 RX ADMIN — INSULIN GLARGINE 30 UNIT(S): 100 INJECTION, SOLUTION SUBCUTANEOUS at 08:03

## 2018-08-01 RX ADMIN — Medication 10 UNIT(S): at 12:05

## 2018-08-01 RX ADMIN — FAMOTIDINE 20 MILLIGRAM(S): 10 INJECTION INTRAVENOUS at 09:08

## 2018-08-01 RX ADMIN — MIRTAZAPINE 30 MILLIGRAM(S): 45 TABLET, ORALLY DISINTEGRATING ORAL at 21:21

## 2018-08-01 RX ADMIN — Medication 0.5 MILLIGRAM(S): at 13:26

## 2018-08-01 RX ADMIN — GABAPENTIN 600 MILLIGRAM(S): 400 CAPSULE ORAL at 21:21

## 2018-08-01 RX ADMIN — NYSTATIN CREAM 1 APPLICATION(S): 100000 CREAM TOPICAL at 21:30

## 2018-08-01 RX ADMIN — OXYCODONE HYDROCHLORIDE 15 MILLIGRAM(S): 5 TABLET ORAL at 21:21

## 2018-08-01 RX ADMIN — FAMOTIDINE 20 MILLIGRAM(S): 10 INJECTION INTRAVENOUS at 21:21

## 2018-08-01 RX ADMIN — Medication 81 MILLIGRAM(S): at 09:08

## 2018-08-01 RX ADMIN — BUDESONIDE AND FORMOTEROL FUMARATE DIHYDRATE 2 PUFF(S): 160; 4.5 AEROSOL RESPIRATORY (INHALATION) at 21:19

## 2018-08-01 RX ADMIN — Medication 0.5 MILLIGRAM(S): at 09:08

## 2018-08-01 RX ADMIN — Medication 20 MILLIGRAM(S): at 09:08

## 2018-08-01 RX ADMIN — Medication 50 MICROGRAM(S): at 06:06

## 2018-08-01 RX ADMIN — Medication 25 MILLIGRAM(S): at 13:30

## 2018-08-01 RX ADMIN — ATORVASTATIN CALCIUM 20 MILLIGRAM(S): 80 TABLET, FILM COATED ORAL at 21:21

## 2018-08-01 RX ADMIN — Medication 2: at 17:10

## 2018-08-01 RX ADMIN — Medication 2: at 12:02

## 2018-08-01 NOTE — PROGRESS NOTE BEHAVIORAL HEALTH - NSBHFUPINTERVALHXFT_PSY_A_CORE
Patient seen, chart reviewed and case discussed with staff.  No significant interval events reported Adjusting to the Unit well, continues with + care seeking behavior throughout the day (spends significant time at nurses station with different requests). Denies any current SI, and reports that when she overdosed on insulin, she texted her friend to let her know and the friend called the police.  Reports if she has SI on the unit she will tell staff.  No Rx SE or sx TD/EPS are noted or reported.  .  Tolerating increase in Celexa and Abilify well.  Atarax prn begun to help with anxiety.

## 2018-08-01 NOTE — PROGRESS NOTE BEHAVIORAL HEALTH - OTHER
reports anxious limited neutral uncooperative at meals, as eats other patient's food, and is resistant to maintaining her consistent CHO diet poor historian at times short haircut; multiple tattoos

## 2018-08-01 NOTE — PROGRESS NOTE BEHAVIORAL HEALTH - SUMMARY
48 yo female with chronic pain and medical issues with limited primary and secondary supports presenting with low mood, ambivalence about her future who stated that she is unsafe at home due to recent suicide attempts and continues to request inpatient psychiatric admission she also admitted to recent crack/cocaine abuse. She signed voluntarily. (admission info)  Patient adjusting to unit.  Denies current SI but reports not sure what she would do at home.  Reports depressed, Celexa increased to 40mg and Abilify 5mg qam begun.

## 2018-08-02 LAB
GLUCOSE BLDC GLUCOMTR-MCNC: 129 MG/DL — HIGH (ref 70–99)
GLUCOSE BLDC GLUCOMTR-MCNC: 172 MG/DL — HIGH (ref 70–99)
GLUCOSE BLDC GLUCOMTR-MCNC: 92 MG/DL — SIGNIFICANT CHANGE UP (ref 70–99)
GLUCOSE BLDC GLUCOMTR-MCNC: 93 MG/DL — SIGNIFICANT CHANGE UP (ref 70–99)

## 2018-08-02 PROCEDURE — 99233 SBSQ HOSP IP/OBS HIGH 50: CPT

## 2018-08-02 RX ORDER — CYCLOBENZAPRINE HYDROCHLORIDE 10 MG/1
5 TABLET, FILM COATED ORAL THREE TIMES A DAY
Refills: 0 | Status: DISCONTINUED | OUTPATIENT
Start: 2018-08-02 | End: 2018-08-03

## 2018-08-02 RX ORDER — OXYCODONE HYDROCHLORIDE 5 MG/1
15 TABLET ORAL EVERY 8 HOURS
Refills: 0 | Status: DISCONTINUED | OUTPATIENT
Start: 2018-08-02 | End: 2018-08-08

## 2018-08-02 RX ORDER — HYDROXYZINE HCL 10 MG
50 TABLET ORAL EVERY 6 HOURS
Refills: 0 | Status: DISCONTINUED | OUTPATIENT
Start: 2018-08-02 | End: 2018-08-14

## 2018-08-02 RX ORDER — OXYCODONE HYDROCHLORIDE 5 MG/1
15 TABLET ORAL EVERY 8 HOURS
Refills: 0 | Status: DISCONTINUED | OUTPATIENT
Start: 2018-08-02 | End: 2018-08-02

## 2018-08-02 RX ORDER — GABAPENTIN 400 MG/1
800 CAPSULE ORAL
Refills: 0 | Status: DISCONTINUED | OUTPATIENT
Start: 2018-08-02 | End: 2018-08-14

## 2018-08-02 RX ORDER — ACETAMINOPHEN 500 MG
650 TABLET ORAL EVERY 8 HOURS
Refills: 0 | Status: DISCONTINUED | OUTPATIENT
Start: 2018-08-02 | End: 2018-08-14

## 2018-08-02 RX ORDER — CLONAZEPAM 1 MG
1 TABLET ORAL THREE TIMES A DAY
Refills: 0 | Status: DISCONTINUED | OUTPATIENT
Start: 2018-08-02 | End: 2018-08-08

## 2018-08-02 RX ADMIN — GABAPENTIN 800 MILLIGRAM(S): 400 CAPSULE ORAL at 21:12

## 2018-08-02 RX ADMIN — Medication 20 MILLIGRAM(S): at 21:13

## 2018-08-02 RX ADMIN — INSULIN GLARGINE 30 UNIT(S): 100 INJECTION, SOLUTION SUBCUTANEOUS at 09:15

## 2018-08-02 RX ADMIN — CYCLOBENZAPRINE HYDROCHLORIDE 5 MILLIGRAM(S): 10 TABLET, FILM COATED ORAL at 21:12

## 2018-08-02 RX ADMIN — MONTELUKAST 10 MILLIGRAM(S): 4 TABLET, CHEWABLE ORAL at 21:14

## 2018-08-02 RX ADMIN — Medication 0.5 MILLIGRAM(S): at 09:05

## 2018-08-02 RX ADMIN — ATORVASTATIN CALCIUM 20 MILLIGRAM(S): 80 TABLET, FILM COATED ORAL at 21:13

## 2018-08-02 RX ADMIN — CITALOPRAM 40 MILLIGRAM(S): 10 TABLET, FILM COATED ORAL at 21:15

## 2018-08-02 RX ADMIN — Medication 1 MILLIGRAM(S): at 21:14

## 2018-08-02 RX ADMIN — Medication 10 UNIT(S): at 09:16

## 2018-08-02 RX ADMIN — OXYCODONE HYDROCHLORIDE 15 MILLIGRAM(S): 5 TABLET ORAL at 11:22

## 2018-08-02 RX ADMIN — FAMOTIDINE 20 MILLIGRAM(S): 10 INJECTION INTRAVENOUS at 09:05

## 2018-08-02 RX ADMIN — NYSTATIN CREAM 1 APPLICATION(S): 100000 CREAM TOPICAL at 06:28

## 2018-08-02 RX ADMIN — Medication 1 TABLET(S): at 16:59

## 2018-08-02 RX ADMIN — Medication 1 MILLIGRAM(S): at 13:40

## 2018-08-02 RX ADMIN — OXYCODONE HYDROCHLORIDE 15 MILLIGRAM(S): 5 TABLET ORAL at 13:40

## 2018-08-02 RX ADMIN — NYSTATIN CREAM 1 APPLICATION(S): 100000 CREAM TOPICAL at 21:19

## 2018-08-02 RX ADMIN — OXYCODONE HYDROCHLORIDE 15 MILLIGRAM(S): 5 TABLET ORAL at 22:00

## 2018-08-02 RX ADMIN — Medication 2: at 17:00

## 2018-08-02 RX ADMIN — Medication 650 MILLIGRAM(S): at 15:30

## 2018-08-02 RX ADMIN — Medication 100 MILLIGRAM(S): at 21:14

## 2018-08-02 RX ADMIN — Medication 81 MILLIGRAM(S): at 09:05

## 2018-08-02 RX ADMIN — OXYCODONE HYDROCHLORIDE 15 MILLIGRAM(S): 5 TABLET ORAL at 14:17

## 2018-08-02 RX ADMIN — Medication 1 PATCH: at 09:04

## 2018-08-02 RX ADMIN — BUDESONIDE AND FORMOTEROL FUMARATE DIHYDRATE 2 PUFF(S): 160; 4.5 AEROSOL RESPIRATORY (INHALATION) at 21:11

## 2018-08-02 RX ADMIN — OXYCODONE HYDROCHLORIDE 15 MILLIGRAM(S): 5 TABLET ORAL at 21:12

## 2018-08-02 RX ADMIN — Medication 1 TABLET(S): at 09:16

## 2018-08-02 RX ADMIN — Medication 650 MILLIGRAM(S): at 21:19

## 2018-08-02 RX ADMIN — MIRTAZAPINE 30 MILLIGRAM(S): 45 TABLET, ORALLY DISINTEGRATING ORAL at 21:13

## 2018-08-02 RX ADMIN — BUDESONIDE AND FORMOTEROL FUMARATE DIHYDRATE 2 PUFF(S): 160; 4.5 AEROSOL RESPIRATORY (INHALATION) at 09:14

## 2018-08-02 RX ADMIN — Medication 10 UNIT(S): at 17:00

## 2018-08-02 RX ADMIN — Medication 25 MILLIGRAM(S): at 12:05

## 2018-08-02 RX ADMIN — Medication 10 UNIT(S): at 12:09

## 2018-08-02 RX ADMIN — INSULIN GLARGINE 30 UNIT(S): 100 INJECTION, SOLUTION SUBCUTANEOUS at 21:10

## 2018-08-02 RX ADMIN — Medication 50 MICROGRAM(S): at 06:06

## 2018-08-02 RX ADMIN — GABAPENTIN 600 MILLIGRAM(S): 400 CAPSULE ORAL at 09:05

## 2018-08-02 RX ADMIN — Medication 20 MILLIGRAM(S): at 09:05

## 2018-08-02 RX ADMIN — FAMOTIDINE 20 MILLIGRAM(S): 10 INJECTION INTRAVENOUS at 21:15

## 2018-08-02 RX ADMIN — Medication 325 MILLIGRAM(S): at 09:05

## 2018-08-02 RX ADMIN — ARIPIPRAZOLE 5 MILLIGRAM(S): 15 TABLET ORAL at 09:05

## 2018-08-02 RX ADMIN — OXYCODONE HYDROCHLORIDE 15 MILLIGRAM(S): 5 TABLET ORAL at 09:13

## 2018-08-02 RX ADMIN — LISINOPRIL 2.5 MILLIGRAM(S): 2.5 TABLET ORAL at 09:05

## 2018-08-02 NOTE — PROGRESS NOTE ADULT - SUBJECTIVE AND OBJECTIVE BOX
Neurology follow up note    OBED [id number]      Interval History:    Patient feels her thoughts are racing with occ headaches      MEDICATIONS    acetaminophen   Tablet. 650 milliGRAM(s) Oral every 8 hours PRN  ALBUTerol/ipratropium for Nebulization. 3 milliLiter(s) Nebulizer once  ARIPiprazole 5 milliGRAM(s) Oral daily  aspirin  chewable 81 milliGRAM(s) Oral daily  atorvastatin 20 milliGRAM(s) Oral at bedtime  bismuth subsalicylate Liquid 30 milliLiter(s) Oral every 2 hours PRN  buDESOnide  80 MICROgram(s)/formoterol 4.5 MICROgram(s) Inhaler 2 Puff(s) Inhalation two times a day  citalopram 40 milliGRAM(s) Oral at bedtime  clonazePAM Tablet 1 milliGRAM(s) Oral three times a day  cyclobenzaprine 5 milliGRAM(s) Oral three times a day  dextrose 40% Gel 15 Gram(s) Oral once PRN  dextrose 5%. 1000 milliLiter(s) IV Continuous <Continuous>  dextrose 50% Injectable 12.5 Gram(s) IV Push once  dextrose 50% Injectable 25 Gram(s) IV Push once  dextrose 50% Injectable 25 Gram(s) IV Push once  famotidine    Tablet 20 milliGRAM(s) Oral two times a day  ferrous    sulfate 325 milliGRAM(s) Oral daily  furosemide    Tablet 20 milliGRAM(s) Oral two times a day  gabapentin 800 milliGRAM(s) Oral two times a day  glucagon  Injectable 1 milliGRAM(s) IntraMuscular once PRN  hydrOXYzine hydrochloride 50 milliGRAM(s) Oral every 6 hours PRN  insulin glargine Injectable (LANTUS) 30 Unit(s) SubCutaneous two times a day  insulin lispro (HumaLOG) corrective regimen sliding scale   SubCutaneous three times a day before meals  insulin lispro Injectable (HumaLOG) 10 Unit(s) SubCutaneous three times a day before meals  lactobacillus acidophilus 1 Tablet(s) Oral two times a day with meals  levothyroxine 50 MICROGram(s) Oral daily  lisinopril 2.5 milliGRAM(s) Oral daily  loperamide 2 milliGRAM(s) Oral three times a day PRN  LORazepam   Injectable 2 milliGRAM(s) IntraMuscular every 6 hours PRN  mirtazapine 30 milliGRAM(s) Oral at bedtime  montelukast 10 milliGRAM(s) Oral daily  nicotine -  14 mG/24Hr(s) Patch 1 patch Transdermal daily  nystatin Powder 1 Application(s) Topical two times a day  oxyCODONE  ER Tablet 15 milliGRAM(s) Oral every 8 hours  traZODone 100 milliGRAM(s) Oral at bedtime      Allergies    apple (Unknown)  iodine (Unknown)  Peaches (Unknown)  raw fruits and vetables (Unknown)    Intolerances            Vital Signs Last 24 Hrs  T(C): --  T(F): --  HR: --  BP: --  BP(mean): --  RR: --  SpO2: --    REVIEW OF SYSTEMS:    Constitutional: No fever, chills, fatigue, weakness  Eyes: no eye pain, visual disturbances, or discharge  ENT:  No difficulty hearing, tinnitus, vertigo; No sinus or throat pain  Neck: No pain or stiffness  Respiratory: No cough, dyspnea, wheezing   Cardiovascular: No chest pain, palpitations,   Gastrointestinal: No abdominal or epigastric pain. No nausea, vomiting  No diarrhea or constipation.   Genitourinary: No dysuria, frequency, hematuria or incontinence  Neurological: No headaches, lightheadedness, vertigo, numbness or tremors  Psychiatric: No depression, anxiety, mood swings or difficulty sleeping  Musculoskeletal: H/O of extremity pain  Skin: No itching, burning, rashes or lesions   Lymph Nodes: No enlarged glands  Endocrine: No heat or cold intolerance; No hair loss   Allergy and Immunologic: No hives or eczema    On Neurological Examination:   The patient is awake and alert.    Location was hospital, year was 2018, and month was July.  Recall was 2/3 without prompting and 3/3 with prompting.  Was able to name simple objects.     Extraocular movements were intact.      Pupils were equal, round, and reactive bilaterally, 3 mm to 2 mm.      Speech was fluent.      Smile was symmetric.      Motor:  Bilateral upper and lower were 4/5.  Sensory:  Bilateral upper and lower are intact to light touch.      Gait:  The patient used a walker to walk with.    Follow simple commands    GENERAL Exam: Nontoxic , No Acute Distress   	  HEENT:  normocephalic, atraumatic  		  LUNGS: Clear bilaterally  No Wheeze  Decreased bilaterally  	  HEART: Normal S1S2   No murmur RRR        	  GI/ ABDOMEN:  Soft  Non tender    EXTREMITIES:   No Edema  No Clubbing  No Cyanosis No Edema    MUSCULOSKELETAL: decreased Range of Motion all 4 extremities   	   SKIN: Normal  No Ecchymosis               LABS:            Hemoglobin A1C:       Vitamin B12         RADIOLOGY      ANALYSIS AND PLAN:  This is a 47-year-old with a history of forgetfulness, memory issues, head trauma, spinal disc disease, and paresthesias.  1.	For history of memory issues, questionable this could be secondary to pseudodementia secondary to depression versus possible history of head trauma leading to postconcussion syndrome.  I do not feel that this is a from a new cerebrovascular accident.  2.	For underlying depression, I would recommend to continue the patient on psychiatric treatment.  3.	For history of spinal disc disease, continue the patient on her current medications with gabapentin.  The patient states that she needs to use a stimulator.  4.	For history of paresthesias, most likely secondary to diabetes and spinal disc disease with peripheral neuropathy, continue the patient on gabapentin.  5.	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  6.	From Neurology standpoint, the patient is stable.  7.	will add tylenol prn for headaches avoid any narcotics if possible     Thank you for the courtesy of this consultation.      Greater than 45 minutes spent in direct patient care reviewing  the notes, lab data/ imaging , discussion with multidisciplinary team.

## 2018-08-02 NOTE — PROGRESS NOTE BEHAVIORAL HEALTH - NSBHFUPINTERVALHXFT_PSY_A_CORE
Patient had a nice conversation with Writer; talked about her medications, home doses and provided her pharmacy information which Writer called and information checked out to what patient said. Patient stated that she feels lonely, and frustrated with her life, she has no one to even get her a cup of water. Her mother past away 10 years ago come this October. She is close with her sister, would like to live together with her (ie. share mortgage, utilities etc) which her sister refuses to do as she is living with her boyfriend and said she would like privacy. Patient openly said that she did cocaine about 2 weeks ago, misses it as it makes her feel good and also she has no pain when she uses it.   Patient received cortisone injection to her neck and her lower back bilaterally 2 weeks prior to admission by Dr Parrish Oh who is her Pain Management specialist at [facility]. Patient also is seeing Dr Deleon. 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     COLLATERAL FROM PATIENT'S PHARMACY: [facility], located on [address] in [place], [place], phone [phone]: Patient’s current medication regimen confirmed as: furosemide 40mg po qd, celexa 40mg po qd (two tabs of 20mg), trazodone 100mg PO qhs, Lisinopril 2.5mg PO qd; atorvastatin 10mg po qhs, synthroid 50mcg po qd; Singulair 10mg po qd; gabapentin 600mg PO bid; Remeron 30mg po qhs; gabapentin 300mg Po qd; was also on ; gabapentin 800mg PO bid ; Klonopin 1mg po q6hrs prn (prescribed by Dr Eryn Guan), Novilog Flex pen 6-10 units SQ bid; Soma 350mg PO q12hrs; oxycodone 15mg PO 4x/day prn; oxycontin 30mg PO bid prn (RX by Dr Parrish Oh) Patient had a nice conversation with Writer; talked about her medications, home doses and provided her pharmacy information which Writer called and information checked out to what patient said. Patient stated that she feels lonely, and frustrated with her life, she has no one to even get her a cup of water. Her mother past away 10 years ago come this October. She is close with her sister, would like to live together with her (ie. share mortgage, utilities etc) which her sister refuses to do as she is living with her boyfriend and said she would like privacy. Patient openly said that she did cocaine about 2 weeks ago, misses it as it makes her feel good and also she has no pain when she uses it.   Patient received cortisone injection to her neck and her lower back bilaterally 2 weeks prior to admission by Dr Parrish Oh who is her Pain Management specialist at [facility]. Patient also is seeing Dr Deleon. 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     COLLATERAL FROM PATIENT'S PHARMACY: [facility], located on [address] in [place], [place], phone [phone]: Patient’s current medication regimen confirmed as: furosemide 40mg po qd, celexa 40mg po qd (two tabs of 20mg), trazodone 100mg PO qhs, Lisinopril 2.5mg PO qd; atorvastatin 10mg po qhs, synthroid 50mcg po qd; Singulair 10mg po qd; gabapentin 600mg PO bid; Remeron 30mg po qhs; gabapentin 300mg Po qd; was also on ; gabapentin 800mg PO bid ; Klonopin 1mg po q6hrs prn (prescribed by Dr Eryn Guan), Novilog Flex pen 6-10 units SQ bid; Soma 350mg PO q12hrs; oxycodone 15mg PO 4x/day prn; oxycontin 30mg PO bid prn (RX by Dr Parrish Oh)    ISTOP CHECKED: reference # [id number]    Patient does not seem to be doctor shopping and sees same 3 providers regularly:    Daxa Guan MD prescribes clonazepam 1mg PO up tp 4x/day PRN, given 30 day supply of 120 tabs; filled on 7/12/18  Koko ARMENDARIZ prescribes oxycodone hcl 15mg tab up tp 4x/day PRN; given 30 day supply of 120 tabs; filled on 6/25/18  Koko ARMENDARIZ prescribes carisoprodol 350mg PO tab PO BID, given 30 day supply of 60 tabs; filled on 6/25/18  Koko ARMENDARIZ prescribes oxycontin 30mg PO tab PO BID PRN, given 30 day supply of 60 tabs; filled on 6/25/18

## 2018-08-02 NOTE — CHART NOTE - NSCHARTNOTEFT_GEN_A_CORE
ISTOP CHECKED: reference # [id number]    Patient does not seem to be doctor shopping and sees same 3 providers regularly:    Daxa Guan MD prescribes clonazepam 1mg PO up tp 4x/day PRN, given 30 day supply of 120 tabs; filled on 7/12/18  Koko ARMENDARIZ prescribes oxycodone hcl 15mg tab up tp 4x/day PRN; given 30 day supply of 120 tabs; filled on 6/25/18  Koko ARMENDARIZ prescribes carisoprodol 350mg PO tab PO BID, given 30 day supply of 60 tabs; filled on 6/25/18  Koko ARMENDARIZ prescribes oxycontin 30mg PO tab PO BID PRN, given 30 day supply of 60 tabs; filled on 6/25/18

## 2018-08-03 LAB
ANION GAP SERPL CALC-SCNC: 7 MMOL/L — SIGNIFICANT CHANGE UP (ref 5–17)
BUN SERPL-MCNC: 36 MG/DL — HIGH (ref 7–23)
CALCIUM SERPL-MCNC: 8.7 MG/DL — SIGNIFICANT CHANGE UP (ref 8.4–10.5)
CHLORIDE SERPL-SCNC: 106 MMOL/L — SIGNIFICANT CHANGE UP (ref 96–108)
CO2 SERPL-SCNC: 26 MMOL/L — SIGNIFICANT CHANGE UP (ref 22–31)
CREAT SERPL-MCNC: 0.95 MG/DL — SIGNIFICANT CHANGE UP (ref 0.5–1.3)
GLUCOSE BLDC GLUCOMTR-MCNC: 128 MG/DL — HIGH (ref 70–99)
GLUCOSE BLDC GLUCOMTR-MCNC: 172 MG/DL — HIGH (ref 70–99)
GLUCOSE BLDC GLUCOMTR-MCNC: 215 MG/DL — HIGH (ref 70–99)
GLUCOSE BLDC GLUCOMTR-MCNC: 80 MG/DL — SIGNIFICANT CHANGE UP (ref 70–99)
GLUCOSE SERPL-MCNC: 181 MG/DL — HIGH (ref 70–99)
HCT VFR BLD CALC: 27.5 % — LOW (ref 34.5–45)
HGB BLD-MCNC: 8.4 G/DL — LOW (ref 11.5–15.5)
MCHC RBC-ENTMCNC: 19.4 PG — LOW (ref 27–34)
MCHC RBC-ENTMCNC: 30.5 GM/DL — LOW (ref 32–36)
MCV RBC AUTO: 63.5 FL — LOW (ref 80–100)
NRBC # BLD: 0 /100 WBCS — SIGNIFICANT CHANGE UP (ref 0–0)
PLATELET # BLD AUTO: 238 K/UL — SIGNIFICANT CHANGE UP (ref 150–400)
POTASSIUM SERPL-MCNC: 4.6 MMOL/L — SIGNIFICANT CHANGE UP (ref 3.5–5.3)
POTASSIUM SERPL-SCNC: 4.6 MMOL/L — SIGNIFICANT CHANGE UP (ref 3.5–5.3)
RBC # BLD: 4.33 M/UL — SIGNIFICANT CHANGE UP (ref 3.8–5.2)
RBC # FLD: 16.2 % — HIGH (ref 10.3–14.5)
SODIUM SERPL-SCNC: 139 MMOL/L — SIGNIFICANT CHANGE UP (ref 135–145)
WBC # BLD: 10.87 K/UL — HIGH (ref 3.8–10.5)
WBC # FLD AUTO: 10.87 K/UL — HIGH (ref 3.8–10.5)

## 2018-08-03 PROCEDURE — 99232 SBSQ HOSP IP/OBS MODERATE 35: CPT

## 2018-08-03 RX ORDER — FERROUS SULFATE 325(65) MG
325 TABLET ORAL
Refills: 0 | Status: DISCONTINUED | OUTPATIENT
Start: 2018-08-03 | End: 2018-08-14

## 2018-08-03 RX ORDER — CYCLOBENZAPRINE HYDROCHLORIDE 10 MG/1
10 TABLET, FILM COATED ORAL THREE TIMES A DAY
Refills: 0 | Status: DISCONTINUED | OUTPATIENT
Start: 2018-08-03 | End: 2018-08-14

## 2018-08-03 RX ADMIN — Medication 20 MILLIGRAM(S): at 08:36

## 2018-08-03 RX ADMIN — OXYCODONE HYDROCHLORIDE 15 MILLIGRAM(S): 5 TABLET ORAL at 20:34

## 2018-08-03 RX ADMIN — Medication 100 MILLIGRAM(S): at 20:32

## 2018-08-03 RX ADMIN — Medication 1 MILLIGRAM(S): at 20:31

## 2018-08-03 RX ADMIN — Medication 325 MILLIGRAM(S): at 20:32

## 2018-08-03 RX ADMIN — INSULIN GLARGINE 30 UNIT(S): 100 INJECTION, SOLUTION SUBCUTANEOUS at 08:17

## 2018-08-03 RX ADMIN — Medication 50 MICROGRAM(S): at 08:10

## 2018-08-03 RX ADMIN — OXYCODONE HYDROCHLORIDE 15 MILLIGRAM(S): 5 TABLET ORAL at 13:31

## 2018-08-03 RX ADMIN — Medication 1 MILLIGRAM(S): at 13:30

## 2018-08-03 RX ADMIN — Medication 20 MILLIGRAM(S): at 20:31

## 2018-08-03 RX ADMIN — MONTELUKAST 10 MILLIGRAM(S): 4 TABLET, CHEWABLE ORAL at 20:31

## 2018-08-03 RX ADMIN — LISINOPRIL 2.5 MILLIGRAM(S): 2.5 TABLET ORAL at 08:36

## 2018-08-03 RX ADMIN — GABAPENTIN 800 MILLIGRAM(S): 400 CAPSULE ORAL at 20:31

## 2018-08-03 RX ADMIN — FAMOTIDINE 20 MILLIGRAM(S): 10 INJECTION INTRAVENOUS at 20:31

## 2018-08-03 RX ADMIN — Medication 1 TABLET(S): at 17:15

## 2018-08-03 RX ADMIN — CYCLOBENZAPRINE HYDROCHLORIDE 10 MILLIGRAM(S): 10 TABLET, FILM COATED ORAL at 20:31

## 2018-08-03 RX ADMIN — Medication 325 MILLIGRAM(S): at 08:36

## 2018-08-03 RX ADMIN — Medication 10 UNIT(S): at 12:18

## 2018-08-03 RX ADMIN — GABAPENTIN 800 MILLIGRAM(S): 400 CAPSULE ORAL at 08:36

## 2018-08-03 RX ADMIN — ARIPIPRAZOLE 5 MILLIGRAM(S): 15 TABLET ORAL at 08:37

## 2018-08-03 RX ADMIN — Medication 2: at 08:17

## 2018-08-03 RX ADMIN — Medication 50 MILLIGRAM(S): at 18:28

## 2018-08-03 RX ADMIN — Medication 1 MILLIGRAM(S): at 08:36

## 2018-08-03 RX ADMIN — OXYCODONE HYDROCHLORIDE 15 MILLIGRAM(S): 5 TABLET ORAL at 15:19

## 2018-08-03 RX ADMIN — OXYCODONE HYDROCHLORIDE 15 MILLIGRAM(S): 5 TABLET ORAL at 20:31

## 2018-08-03 RX ADMIN — Medication 1 PATCH: at 09:30

## 2018-08-03 RX ADMIN — CITALOPRAM 40 MILLIGRAM(S): 10 TABLET, FILM COATED ORAL at 20:31

## 2018-08-03 RX ADMIN — MIRTAZAPINE 30 MILLIGRAM(S): 45 TABLET, ORALLY DISINTEGRATING ORAL at 20:31

## 2018-08-03 RX ADMIN — CYCLOBENZAPRINE HYDROCHLORIDE 5 MILLIGRAM(S): 10 TABLET, FILM COATED ORAL at 08:36

## 2018-08-03 RX ADMIN — OXYCODONE HYDROCHLORIDE 15 MILLIGRAM(S): 5 TABLET ORAL at 08:10

## 2018-08-03 RX ADMIN — FAMOTIDINE 20 MILLIGRAM(S): 10 INJECTION INTRAVENOUS at 08:36

## 2018-08-03 RX ADMIN — CYCLOBENZAPRINE HYDROCHLORIDE 5 MILLIGRAM(S): 10 TABLET, FILM COATED ORAL at 13:30

## 2018-08-03 RX ADMIN — ATORVASTATIN CALCIUM 20 MILLIGRAM(S): 80 TABLET, FILM COATED ORAL at 20:32

## 2018-08-03 RX ADMIN — INSULIN GLARGINE 30 UNIT(S): 100 INJECTION, SOLUTION SUBCUTANEOUS at 20:32

## 2018-08-03 RX ADMIN — Medication 10 UNIT(S): at 17:15

## 2018-08-03 RX ADMIN — Medication 1 TABLET(S): at 08:50

## 2018-08-03 RX ADMIN — BUDESONIDE AND FORMOTEROL FUMARATE DIHYDRATE 2 PUFF(S): 160; 4.5 AEROSOL RESPIRATORY (INHALATION) at 08:38

## 2018-08-03 RX ADMIN — Medication 10 UNIT(S): at 08:18

## 2018-08-03 RX ADMIN — Medication 1 PATCH: at 08:36

## 2018-08-03 RX ADMIN — Medication 4: at 17:14

## 2018-08-03 RX ADMIN — NYSTATIN CREAM 1 APPLICATION(S): 100000 CREAM TOPICAL at 09:30

## 2018-08-03 RX ADMIN — Medication 81 MILLIGRAM(S): at 08:37

## 2018-08-03 RX ADMIN — Medication 50 MILLIGRAM(S): at 09:29

## 2018-08-03 RX ADMIN — BUDESONIDE AND FORMOTEROL FUMARATE DIHYDRATE 2 PUFF(S): 160; 4.5 AEROSOL RESPIRATORY (INHALATION) at 20:30

## 2018-08-03 NOTE — PROGRESS NOTE BEHAVIORAL HEALTH - NSBHFUPINTERVALHXFT_PSY_A_CORE
Met with patient, chart reviewed and case discussed in tx team meeting.  No significant interval events reported.  Patient reports she is planning on eating in a healthier way, and wants to live a healthy lifestyle.  She discusses family events of the past, and how they upset her, but she tries not to think about them because she wants to move forward. Reports some occasional passive SI, denies active SI, intent or plan.  No Rx SE or sx TD/EPS are noted or reported.

## 2018-08-03 NOTE — PROGRESS NOTE BEHAVIORAL HEALTH - OTHER
short haircut; multiple tattoos does not look sad or depressed focused on her chronic pain  but not on an delusional level; focus also on pain medications - may have misused them before but denies it vague passive SI reactive, full, nonlabile

## 2018-08-04 LAB
GLUCOSE BLDC GLUCOMTR-MCNC: 112 MG/DL — HIGH (ref 70–99)
GLUCOSE BLDC GLUCOMTR-MCNC: 126 MG/DL — HIGH (ref 70–99)
GLUCOSE BLDC GLUCOMTR-MCNC: 181 MG/DL — HIGH (ref 70–99)
GLUCOSE BLDC GLUCOMTR-MCNC: 222 MG/DL — HIGH (ref 70–99)

## 2018-08-04 RX ADMIN — Medication 1 MILLIGRAM(S): at 09:17

## 2018-08-04 RX ADMIN — ARIPIPRAZOLE 5 MILLIGRAM(S): 15 TABLET ORAL at 09:16

## 2018-08-04 RX ADMIN — Medication 325 MILLIGRAM(S): at 09:17

## 2018-08-04 RX ADMIN — CYCLOBENZAPRINE HYDROCHLORIDE 10 MILLIGRAM(S): 10 TABLET, FILM COATED ORAL at 07:41

## 2018-08-04 RX ADMIN — Medication 4: at 17:23

## 2018-08-04 RX ADMIN — Medication 1 PATCH: at 09:15

## 2018-08-04 RX ADMIN — CYCLOBENZAPRINE HYDROCHLORIDE 10 MILLIGRAM(S): 10 TABLET, FILM COATED ORAL at 21:15

## 2018-08-04 RX ADMIN — GABAPENTIN 800 MILLIGRAM(S): 400 CAPSULE ORAL at 09:16

## 2018-08-04 RX ADMIN — MIRTAZAPINE 30 MILLIGRAM(S): 45 TABLET, ORALLY DISINTEGRATING ORAL at 21:15

## 2018-08-04 RX ADMIN — Medication 1 PATCH: at 08:00

## 2018-08-04 RX ADMIN — Medication 1 TABLET(S): at 09:17

## 2018-08-04 RX ADMIN — FAMOTIDINE 20 MILLIGRAM(S): 10 INJECTION INTRAVENOUS at 09:17

## 2018-08-04 RX ADMIN — OXYCODONE HYDROCHLORIDE 15 MILLIGRAM(S): 5 TABLET ORAL at 08:00

## 2018-08-04 RX ADMIN — Medication 1 TABLET(S): at 16:16

## 2018-08-04 RX ADMIN — Medication 1 MILLIGRAM(S): at 21:16

## 2018-08-04 RX ADMIN — BUDESONIDE AND FORMOTEROL FUMARATE DIHYDRATE 2 PUFF(S): 160; 4.5 AEROSOL RESPIRATORY (INHALATION) at 21:12

## 2018-08-04 RX ADMIN — Medication 10 UNIT(S): at 08:05

## 2018-08-04 RX ADMIN — Medication 10 UNIT(S): at 17:24

## 2018-08-04 RX ADMIN — Medication 50 MILLIGRAM(S): at 16:16

## 2018-08-04 RX ADMIN — OXYCODONE HYDROCHLORIDE 15 MILLIGRAM(S): 5 TABLET ORAL at 21:16

## 2018-08-04 RX ADMIN — OXYCODONE HYDROCHLORIDE 15 MILLIGRAM(S): 5 TABLET ORAL at 07:35

## 2018-08-04 RX ADMIN — CITALOPRAM 40 MILLIGRAM(S): 10 TABLET, FILM COATED ORAL at 21:15

## 2018-08-04 RX ADMIN — Medication 2: at 12:18

## 2018-08-04 RX ADMIN — Medication 81 MILLIGRAM(S): at 09:16

## 2018-08-04 RX ADMIN — OXYCODONE HYDROCHLORIDE 15 MILLIGRAM(S): 5 TABLET ORAL at 14:25

## 2018-08-04 RX ADMIN — Medication 1 MILLIGRAM(S): at 13:55

## 2018-08-04 RX ADMIN — INSULIN GLARGINE 30 UNIT(S): 100 INJECTION, SOLUTION SUBCUTANEOUS at 21:13

## 2018-08-04 RX ADMIN — Medication 20 MILLIGRAM(S): at 21:16

## 2018-08-04 RX ADMIN — MONTELUKAST 10 MILLIGRAM(S): 4 TABLET, CHEWABLE ORAL at 21:16

## 2018-08-04 RX ADMIN — Medication 50 MICROGRAM(S): at 06:00

## 2018-08-04 RX ADMIN — Medication 50 MILLIGRAM(S): at 09:16

## 2018-08-04 RX ADMIN — Medication 100 MILLIGRAM(S): at 21:16

## 2018-08-04 RX ADMIN — Medication 10 UNIT(S): at 12:18

## 2018-08-04 RX ADMIN — OXYCODONE HYDROCHLORIDE 15 MILLIGRAM(S): 5 TABLET ORAL at 13:55

## 2018-08-04 RX ADMIN — FAMOTIDINE 20 MILLIGRAM(S): 10 INJECTION INTRAVENOUS at 21:16

## 2018-08-04 RX ADMIN — Medication 20 MILLIGRAM(S): at 09:17

## 2018-08-04 RX ADMIN — NYSTATIN CREAM 1 APPLICATION(S): 100000 CREAM TOPICAL at 21:20

## 2018-08-04 RX ADMIN — LISINOPRIL 2.5 MILLIGRAM(S): 2.5 TABLET ORAL at 09:16

## 2018-08-04 RX ADMIN — GABAPENTIN 800 MILLIGRAM(S): 400 CAPSULE ORAL at 21:14

## 2018-08-04 RX ADMIN — BUDESONIDE AND FORMOTEROL FUMARATE DIHYDRATE 2 PUFF(S): 160; 4.5 AEROSOL RESPIRATORY (INHALATION) at 09:15

## 2018-08-04 RX ADMIN — ATORVASTATIN CALCIUM 20 MILLIGRAM(S): 80 TABLET, FILM COATED ORAL at 21:15

## 2018-08-04 RX ADMIN — INSULIN GLARGINE 30 UNIT(S): 100 INJECTION, SOLUTION SUBCUTANEOUS at 08:05

## 2018-08-04 RX ADMIN — CYCLOBENZAPRINE HYDROCHLORIDE 10 MILLIGRAM(S): 10 TABLET, FILM COATED ORAL at 13:55

## 2018-08-05 LAB
GLUCOSE BLDC GLUCOMTR-MCNC: 115 MG/DL — HIGH (ref 70–99)
GLUCOSE BLDC GLUCOMTR-MCNC: 115 MG/DL — HIGH (ref 70–99)
GLUCOSE BLDC GLUCOMTR-MCNC: 141 MG/DL — HIGH (ref 70–99)
GLUCOSE BLDC GLUCOMTR-MCNC: 152 MG/DL — HIGH (ref 70–99)

## 2018-08-05 PROCEDURE — 99233 SBSQ HOSP IP/OBS HIGH 50: CPT

## 2018-08-05 RX ADMIN — Medication 100 MILLIGRAM(S): at 20:31

## 2018-08-05 RX ADMIN — OXYCODONE HYDROCHLORIDE 15 MILLIGRAM(S): 5 TABLET ORAL at 14:03

## 2018-08-05 RX ADMIN — GABAPENTIN 800 MILLIGRAM(S): 400 CAPSULE ORAL at 20:30

## 2018-08-05 RX ADMIN — Medication 1 MILLIGRAM(S): at 20:30

## 2018-08-05 RX ADMIN — CYCLOBENZAPRINE HYDROCHLORIDE 10 MILLIGRAM(S): 10 TABLET, FILM COATED ORAL at 09:40

## 2018-08-05 RX ADMIN — OXYCODONE HYDROCHLORIDE 15 MILLIGRAM(S): 5 TABLET ORAL at 06:26

## 2018-08-05 RX ADMIN — Medication 1 MILLIGRAM(S): at 09:41

## 2018-08-05 RX ADMIN — Medication 50 MICROGRAM(S): at 06:03

## 2018-08-05 RX ADMIN — OXYCODONE HYDROCHLORIDE 15 MILLIGRAM(S): 5 TABLET ORAL at 20:37

## 2018-08-05 RX ADMIN — BUDESONIDE AND FORMOTEROL FUMARATE DIHYDRATE 2 PUFF(S): 160; 4.5 AEROSOL RESPIRATORY (INHALATION) at 09:41

## 2018-08-05 RX ADMIN — Medication 1 TABLET(S): at 17:01

## 2018-08-05 RX ADMIN — Medication 10 UNIT(S): at 17:03

## 2018-08-05 RX ADMIN — Medication 1 PATCH: at 09:41

## 2018-08-05 RX ADMIN — FAMOTIDINE 20 MILLIGRAM(S): 10 INJECTION INTRAVENOUS at 09:41

## 2018-08-05 RX ADMIN — MONTELUKAST 10 MILLIGRAM(S): 4 TABLET, CHEWABLE ORAL at 20:31

## 2018-08-05 RX ADMIN — CYCLOBENZAPRINE HYDROCHLORIDE 10 MILLIGRAM(S): 10 TABLET, FILM COATED ORAL at 13:09

## 2018-08-05 RX ADMIN — Medication 1 PATCH: at 10:44

## 2018-08-05 RX ADMIN — Medication 20 MILLIGRAM(S): at 06:09

## 2018-08-05 RX ADMIN — Medication 650 MILLIGRAM(S): at 23:48

## 2018-08-05 RX ADMIN — MIRTAZAPINE 30 MILLIGRAM(S): 45 TABLET, ORALLY DISINTEGRATING ORAL at 20:31

## 2018-08-05 RX ADMIN — Medication 10 UNIT(S): at 12:27

## 2018-08-05 RX ADMIN — Medication 2: at 08:07

## 2018-08-05 RX ADMIN — Medication 1 TABLET(S): at 09:41

## 2018-08-05 RX ADMIN — OXYCODONE HYDROCHLORIDE 15 MILLIGRAM(S): 5 TABLET ORAL at 20:30

## 2018-08-05 RX ADMIN — BUDESONIDE AND FORMOTEROL FUMARATE DIHYDRATE 2 PUFF(S): 160; 4.5 AEROSOL RESPIRATORY (INHALATION) at 20:36

## 2018-08-05 RX ADMIN — Medication 10 UNIT(S): at 08:13

## 2018-08-05 RX ADMIN — Medication 50 MILLIGRAM(S): at 23:18

## 2018-08-05 RX ADMIN — OXYCODONE HYDROCHLORIDE 15 MILLIGRAM(S): 5 TABLET ORAL at 16:14

## 2018-08-05 RX ADMIN — CYCLOBENZAPRINE HYDROCHLORIDE 10 MILLIGRAM(S): 10 TABLET, FILM COATED ORAL at 20:31

## 2018-08-05 RX ADMIN — Medication 650 MILLIGRAM(S): at 06:06

## 2018-08-05 RX ADMIN — OXYCODONE HYDROCHLORIDE 15 MILLIGRAM(S): 5 TABLET ORAL at 09:56

## 2018-08-05 RX ADMIN — ARIPIPRAZOLE 5 MILLIGRAM(S): 15 TABLET ORAL at 09:40

## 2018-08-05 RX ADMIN — Medication 81 MILLIGRAM(S): at 09:40

## 2018-08-05 RX ADMIN — CITALOPRAM 40 MILLIGRAM(S): 10 TABLET, FILM COATED ORAL at 20:31

## 2018-08-05 RX ADMIN — LISINOPRIL 2.5 MILLIGRAM(S): 2.5 TABLET ORAL at 09:41

## 2018-08-05 RX ADMIN — ATORVASTATIN CALCIUM 20 MILLIGRAM(S): 80 TABLET, FILM COATED ORAL at 20:31

## 2018-08-05 RX ADMIN — INSULIN GLARGINE 30 UNIT(S): 100 INJECTION, SOLUTION SUBCUTANEOUS at 08:07

## 2018-08-05 RX ADMIN — GABAPENTIN 800 MILLIGRAM(S): 400 CAPSULE ORAL at 09:40

## 2018-08-05 RX ADMIN — FAMOTIDINE 20 MILLIGRAM(S): 10 INJECTION INTRAVENOUS at 20:31

## 2018-08-05 RX ADMIN — INSULIN GLARGINE 30 UNIT(S): 100 INJECTION, SOLUTION SUBCUTANEOUS at 20:29

## 2018-08-05 RX ADMIN — Medication 650 MILLIGRAM(S): at 23:17

## 2018-08-05 RX ADMIN — Medication 1 MILLIGRAM(S): at 13:09

## 2018-08-05 RX ADMIN — Medication 325 MILLIGRAM(S): at 09:41

## 2018-08-05 RX ADMIN — NYSTATIN CREAM 1 APPLICATION(S): 100000 CREAM TOPICAL at 20:30

## 2018-08-05 RX ADMIN — Medication 20 MILLIGRAM(S): at 17:02

## 2018-08-05 NOTE — PROGRESS NOTE BEHAVIORAL HEALTH - OTHER
focused on her chronic pain  but not on an delusional level; focus also on pain medications - may have misused them before but denies it does not look sad or depressed reactive, full, nonlabile vague passive SI short haircut; multiple tattoos

## 2018-08-05 NOTE — PROGRESS NOTE BEHAVIORAL HEALTH - NSBHFUPINTERVALHXFT_PSY_A_CORE
Patient talkative - showed Writer a folder filled with articles and dietary tips including how to read nutrition labels. States that she plans on being more strict with her diet and take better care with her blood sugar. She reconnected with her ex-fiancee Virginia and said that when she did not call her on Saturday, she assumed that she was probably with another woman and she does not know 'why I jump to such conclusions." Expressed interest in therapy and then admitted she was referred to therapist before but never went. Asked for Flexeril to be increased - checked orders and it was increased by Internist at it is at the recommended max daily dose. Otherwise, same clinical presentation - no evidence of a significant primary mood/anxiety/psychotic illness or episode; presentation consistent with chronic pain/physical debility in a young female who has difficulty creating/maintaining successful relationships due to her Axis II personality pathology resulting in limited social supports and feeling lonely and wanting companionship and caretaking. Denies suicidal ideation, intent or plan.

## 2018-08-06 LAB
GLUCOSE BLDC GLUCOMTR-MCNC: 105 MG/DL — HIGH (ref 70–99)
GLUCOSE BLDC GLUCOMTR-MCNC: 139 MG/DL — HIGH (ref 70–99)
GLUCOSE BLDC GLUCOMTR-MCNC: 220 MG/DL — HIGH (ref 70–99)
GLUCOSE BLDC GLUCOMTR-MCNC: 220 MG/DL — HIGH (ref 70–99)
GLUCOSE BLDC GLUCOMTR-MCNC: 79 MG/DL — SIGNIFICANT CHANGE UP (ref 70–99)

## 2018-08-06 PROCEDURE — 93010 ELECTROCARDIOGRAM REPORT: CPT

## 2018-08-06 PROCEDURE — 99232 SBSQ HOSP IP/OBS MODERATE 35: CPT

## 2018-08-06 RX ADMIN — ARIPIPRAZOLE 5 MILLIGRAM(S): 15 TABLET ORAL at 08:57

## 2018-08-06 RX ADMIN — Medication 1 PATCH: at 08:59

## 2018-08-06 RX ADMIN — Medication 100 MILLIGRAM(S): at 20:07

## 2018-08-06 RX ADMIN — Medication 50 MICROGRAM(S): at 06:05

## 2018-08-06 RX ADMIN — OXYCODONE HYDROCHLORIDE 15 MILLIGRAM(S): 5 TABLET ORAL at 16:25

## 2018-08-06 RX ADMIN — MIRTAZAPINE 30 MILLIGRAM(S): 45 TABLET, ORALLY DISINTEGRATING ORAL at 20:07

## 2018-08-06 RX ADMIN — Medication 1 MILLIGRAM(S): at 14:38

## 2018-08-06 RX ADMIN — Medication 20 MILLIGRAM(S): at 17:11

## 2018-08-06 RX ADMIN — Medication 10 UNIT(S): at 17:11

## 2018-08-06 RX ADMIN — GABAPENTIN 800 MILLIGRAM(S): 400 CAPSULE ORAL at 08:58

## 2018-08-06 RX ADMIN — OXYCODONE HYDROCHLORIDE 15 MILLIGRAM(S): 5 TABLET ORAL at 20:50

## 2018-08-06 RX ADMIN — GABAPENTIN 800 MILLIGRAM(S): 400 CAPSULE ORAL at 20:06

## 2018-08-06 RX ADMIN — INSULIN GLARGINE 30 UNIT(S): 100 INJECTION, SOLUTION SUBCUTANEOUS at 08:10

## 2018-08-06 RX ADMIN — NYSTATIN CREAM 1 APPLICATION(S): 100000 CREAM TOPICAL at 20:06

## 2018-08-06 RX ADMIN — Medication 4: at 17:10

## 2018-08-06 RX ADMIN — CYCLOBENZAPRINE HYDROCHLORIDE 10 MILLIGRAM(S): 10 TABLET, FILM COATED ORAL at 14:37

## 2018-08-06 RX ADMIN — BUDESONIDE AND FORMOTEROL FUMARATE DIHYDRATE 2 PUFF(S): 160; 4.5 AEROSOL RESPIRATORY (INHALATION) at 20:08

## 2018-08-06 RX ADMIN — ATORVASTATIN CALCIUM 20 MILLIGRAM(S): 80 TABLET, FILM COATED ORAL at 20:06

## 2018-08-06 RX ADMIN — FAMOTIDINE 20 MILLIGRAM(S): 10 INJECTION INTRAVENOUS at 08:58

## 2018-08-06 RX ADMIN — OXYCODONE HYDROCHLORIDE 15 MILLIGRAM(S): 5 TABLET ORAL at 20:06

## 2018-08-06 RX ADMIN — Medication 1 TABLET(S): at 17:11

## 2018-08-06 RX ADMIN — Medication 1 MILLIGRAM(S): at 09:00

## 2018-08-06 RX ADMIN — Medication 20 MILLIGRAM(S): at 06:07

## 2018-08-06 RX ADMIN — INSULIN GLARGINE 30 UNIT(S): 100 INJECTION, SOLUTION SUBCUTANEOUS at 20:05

## 2018-08-06 RX ADMIN — NYSTATIN CREAM 1 APPLICATION(S): 100000 CREAM TOPICAL at 08:57

## 2018-08-06 RX ADMIN — OXYCODONE HYDROCHLORIDE 15 MILLIGRAM(S): 5 TABLET ORAL at 14:37

## 2018-08-06 RX ADMIN — FAMOTIDINE 20 MILLIGRAM(S): 10 INJECTION INTRAVENOUS at 20:07

## 2018-08-06 RX ADMIN — CYCLOBENZAPRINE HYDROCHLORIDE 10 MILLIGRAM(S): 10 TABLET, FILM COATED ORAL at 20:07

## 2018-08-06 RX ADMIN — Medication 50 MILLIGRAM(S): at 18:10

## 2018-08-06 RX ADMIN — Medication 10 UNIT(S): at 08:10

## 2018-08-06 RX ADMIN — Medication 1 TABLET(S): at 08:58

## 2018-08-06 RX ADMIN — Medication 1 MILLIGRAM(S): at 20:06

## 2018-08-06 RX ADMIN — CITALOPRAM 40 MILLIGRAM(S): 10 TABLET, FILM COATED ORAL at 20:07

## 2018-08-06 RX ADMIN — Medication 81 MILLIGRAM(S): at 08:58

## 2018-08-06 RX ADMIN — CYCLOBENZAPRINE HYDROCHLORIDE 10 MILLIGRAM(S): 10 TABLET, FILM COATED ORAL at 08:58

## 2018-08-06 RX ADMIN — MONTELUKAST 10 MILLIGRAM(S): 4 TABLET, CHEWABLE ORAL at 20:07

## 2018-08-06 RX ADMIN — Medication 325 MILLIGRAM(S): at 20:07

## 2018-08-06 RX ADMIN — BUDESONIDE AND FORMOTEROL FUMARATE DIHYDRATE 2 PUFF(S): 160; 4.5 AEROSOL RESPIRATORY (INHALATION) at 08:57

## 2018-08-06 RX ADMIN — Medication 325 MILLIGRAM(S): at 08:58

## 2018-08-06 RX ADMIN — LISINOPRIL 2.5 MILLIGRAM(S): 2.5 TABLET ORAL at 08:57

## 2018-08-06 NOTE — PROGRESS NOTE BEHAVIORAL HEALTH - SUMMARY
48 yo female with chronic pain and medical issues with limited primary and secondary supports presenting with low mood, ambivalence about her future who stated that she is unsafe at home due to recent suicide attempts and continues to request inpatient psychiatric admission she also admitted to recent crack/cocaine abuse. She signed voluntarily. (admission info)  Patient adjusting to unit.  Denies current SI.  Reported depressed, Celexa increased to 40mg and Abilify 5mg qam begun.  Improved sx

## 2018-08-06 NOTE — PROGRESS NOTE BEHAVIORAL HEALTH - OTHER
focused on her chronic pain  but not on an delusional level; focus also on pain medications - may have misused them before but denies it short haircut; multiple tattoos does not look sad or depressed reactive, full, not labile

## 2018-08-06 NOTE — PROGRESS NOTE BEHAVIORAL HEALTH - NSBHFUPINTERVALHXFT_PSY_A_CORE
Patient seen, chart reviewed, case discussed in tx team meeting.  No significant interval events reported.  Patient reports some verbal conflicts with peers, but is generally in good control. She discusses wanting to go to partial program, as she feels groups are helpful to her.  Otherwise, same clinical presentation - no evidence of a significant primary mood/anxiety/psychotic illness or episode; presentation consistent with chronic pain/physical debility in a young female who has difficulty creating/maintaining successful relationships due to her Axis II personality pathology resulting in limited social supports and feeling lonely and wanting companionship and care taking. Denies suicidal ideation, intent or plan.  Does go to AA on unit.

## 2018-08-07 LAB
GLUCOSE BLDC GLUCOMTR-MCNC: 115 MG/DL — HIGH (ref 70–99)
GLUCOSE BLDC GLUCOMTR-MCNC: 122 MG/DL — HIGH (ref 70–99)
GLUCOSE BLDC GLUCOMTR-MCNC: 192 MG/DL — HIGH (ref 70–99)
GLUCOSE BLDC GLUCOMTR-MCNC: 82 MG/DL — SIGNIFICANT CHANGE UP (ref 70–99)

## 2018-08-07 PROCEDURE — 99232 SBSQ HOSP IP/OBS MODERATE 35: CPT

## 2018-08-07 RX ORDER — ARIPIPRAZOLE 15 MG/1
10 TABLET ORAL DAILY
Refills: 0 | Status: DISCONTINUED | OUTPATIENT
Start: 2018-08-07 | End: 2018-08-13

## 2018-08-07 RX ADMIN — INSULIN GLARGINE 30 UNIT(S): 100 INJECTION, SOLUTION SUBCUTANEOUS at 21:41

## 2018-08-07 RX ADMIN — Medication 2: at 17:07

## 2018-08-07 RX ADMIN — Medication 81 MILLIGRAM(S): at 09:13

## 2018-08-07 RX ADMIN — Medication 1 PATCH: at 09:14

## 2018-08-07 RX ADMIN — LISINOPRIL 2.5 MILLIGRAM(S): 2.5 TABLET ORAL at 09:13

## 2018-08-07 RX ADMIN — Medication 325 MILLIGRAM(S): at 21:44

## 2018-08-07 RX ADMIN — Medication 1 PATCH: at 09:13

## 2018-08-07 RX ADMIN — ATORVASTATIN CALCIUM 20 MILLIGRAM(S): 80 TABLET, FILM COATED ORAL at 21:44

## 2018-08-07 RX ADMIN — GABAPENTIN 800 MILLIGRAM(S): 400 CAPSULE ORAL at 09:12

## 2018-08-07 RX ADMIN — CYCLOBENZAPRINE HYDROCHLORIDE 10 MILLIGRAM(S): 10 TABLET, FILM COATED ORAL at 09:12

## 2018-08-07 RX ADMIN — CYCLOBENZAPRINE HYDROCHLORIDE 10 MILLIGRAM(S): 10 TABLET, FILM COATED ORAL at 13:13

## 2018-08-07 RX ADMIN — Medication 1 MILLIGRAM(S): at 13:13

## 2018-08-07 RX ADMIN — CYCLOBENZAPRINE HYDROCHLORIDE 10 MILLIGRAM(S): 10 TABLET, FILM COATED ORAL at 21:41

## 2018-08-07 RX ADMIN — OXYCODONE HYDROCHLORIDE 15 MILLIGRAM(S): 5 TABLET ORAL at 21:41

## 2018-08-07 RX ADMIN — FAMOTIDINE 20 MILLIGRAM(S): 10 INJECTION INTRAVENOUS at 09:12

## 2018-08-07 RX ADMIN — Medication 1 TABLET(S): at 17:07

## 2018-08-07 RX ADMIN — Medication 1 TABLET(S): at 09:13

## 2018-08-07 RX ADMIN — OXYCODONE HYDROCHLORIDE 15 MILLIGRAM(S): 5 TABLET ORAL at 14:30

## 2018-08-07 RX ADMIN — GABAPENTIN 800 MILLIGRAM(S): 400 CAPSULE ORAL at 21:41

## 2018-08-07 RX ADMIN — NYSTATIN CREAM 1 APPLICATION(S): 100000 CREAM TOPICAL at 21:40

## 2018-08-07 RX ADMIN — Medication 20 MILLIGRAM(S): at 06:05

## 2018-08-07 RX ADMIN — Medication 50 MICROGRAM(S): at 06:06

## 2018-08-07 RX ADMIN — Medication 100 MILLIGRAM(S): at 21:41

## 2018-08-07 RX ADMIN — OXYCODONE HYDROCHLORIDE 15 MILLIGRAM(S): 5 TABLET ORAL at 06:06

## 2018-08-07 RX ADMIN — Medication 10 UNIT(S): at 17:08

## 2018-08-07 RX ADMIN — Medication 650 MILLIGRAM(S): at 17:15

## 2018-08-07 RX ADMIN — ARIPIPRAZOLE 5 MILLIGRAM(S): 15 TABLET ORAL at 09:13

## 2018-08-07 RX ADMIN — OXYCODONE HYDROCHLORIDE 15 MILLIGRAM(S): 5 TABLET ORAL at 14:01

## 2018-08-07 RX ADMIN — Medication 325 MILLIGRAM(S): at 09:12

## 2018-08-07 RX ADMIN — FAMOTIDINE 20 MILLIGRAM(S): 10 INJECTION INTRAVENOUS at 21:42

## 2018-08-07 RX ADMIN — Medication 1 MILLIGRAM(S): at 21:44

## 2018-08-07 RX ADMIN — CITALOPRAM 40 MILLIGRAM(S): 10 TABLET, FILM COATED ORAL at 21:41

## 2018-08-07 RX ADMIN — Medication 10 UNIT(S): at 08:24

## 2018-08-07 RX ADMIN — Medication 20 MILLIGRAM(S): at 17:09

## 2018-08-07 RX ADMIN — BUDESONIDE AND FORMOTEROL FUMARATE DIHYDRATE 2 PUFF(S): 160; 4.5 AEROSOL RESPIRATORY (INHALATION) at 09:14

## 2018-08-07 RX ADMIN — BUDESONIDE AND FORMOTEROL FUMARATE DIHYDRATE 2 PUFF(S): 160; 4.5 AEROSOL RESPIRATORY (INHALATION) at 20:30

## 2018-08-07 RX ADMIN — Medication 1 MILLIGRAM(S): at 09:13

## 2018-08-07 RX ADMIN — Medication 50 MILLIGRAM(S): at 09:20

## 2018-08-07 RX ADMIN — INSULIN GLARGINE 30 UNIT(S): 100 INJECTION, SOLUTION SUBCUTANEOUS at 08:25

## 2018-08-07 RX ADMIN — NYSTATIN CREAM 1 APPLICATION(S): 100000 CREAM TOPICAL at 06:18

## 2018-08-07 NOTE — PROGRESS NOTE BEHAVIORAL HEALTH - NSBHFUPSUICACTIVE_PSY_A_CORE
none known
yes
none known

## 2018-08-07 NOTE — PROGRESS NOTE BEHAVIORAL HEALTH - SUMMARY
48 yo female with chronic pain and medical issues with limited primary and secondary supports presenting with low mood, ambivalence about her future who stated that she is unsafe at home due to recent suicide attempts and continues to request inpatient psychiatric admission she also admitted to recent crack/cocaine abuse. She signed voluntarily. (admission info)  Patient adjusting to unit.  Denies current SI.  Reported depressed, Celexa increased to 40mg and Abilify 5mg qam begun.  On 8/7, patient reports SI with plan, and Abilify increased to 10mg

## 2018-08-07 NOTE — PROGRESS NOTE BEHAVIORAL HEALTH - NSBHFUPSUICPLAN_PSY_A_CORE
none known
yes
none known

## 2018-08-07 NOTE — PROGRESS NOTE BEHAVIORAL HEALTH - NSBHFUPSUICINTERVALFT_PSY_A_CORE
Denies current SI, but not sure what she would do at home.  Has hx of overdosing on insulin, and cutting wrists
Denies current SI, but not sure what she would do at home.  Has hx of overdosing on insulin, and cutting wrists.  Reports wants to get well and attend niece's shower
Denies current SI, but not sure what she would do at home.  Has hx of overdosing on insulin, and cutting wrists
Patient reports SI with plan, but is not sure about intent
Denies current SI, but not sure what she would do at home.  Has hx of overdosing on insulin, and cutting wrists.  Reports wants to get well and attend niece's shower
Denies current SI, but not sure what she would do at home.  Has hx of overdosing on insulin, and cutting wrists.  Reports wants to get well and attend niece's shower
Denies current SI, but not sure what she would do at home.  Has hx of overdosing on insulin, and cutting wrists

## 2018-08-07 NOTE — PROGRESS NOTE BEHAVIORAL HEALTH - OTHER
focused on her chronic pain  but not on an delusional level; focus also on pain medications - may have misused them before but denies it limited short haircut; multiple tattoos does not look sad or depressed, irritable at times reactive, full, not labile

## 2018-08-07 NOTE — PROGRESS NOTE BEHAVIORAL HEALTH - RISK ASSESSMENT
Patient reports SI with a plan, not sure of intent on 8/7.  Risk Factors:  hopeless, mood episode, substance abuse, perceived burden to family, chronic pain, access to means,   Protective factors: IDs one reason to live (her cat), future oriented, positive therapeutic relationships,

## 2018-08-07 NOTE — PROGRESS NOTE BEHAVIORAL HEALTH - NSBHFUPINTERVALHXFT_PSY_A_CORE
Patient seen, chart reviewed, case discussed in tx team meeting.  No significant interval events reported.  Patient reports some verbal conflicts with peers, but is generally in good control. She discusses wanting to go to partial program, as she feels groups are helpful to her.  Otherwise, same clinical presentation - no evidence of a significant primary mood/anxiety/psychotic illness or episode; presentation consistent with chronic pain/physical debility in a young female who has difficulty creating/maintaining successful relationships due to her Axis II personality pathology resulting in limited social supports and feeling lonely and wanting companionship and care taking. Reports SI today, with plan.  Not sure if she would act on it.  Denies she would harm self here.  Patient reports she does want to go to partial program, but does not feel ready yet.  CAMS assessment done, with  patient showing risk for suicide. IDs reason for dying as she would no longer be a burden to her family.  She does express some wish to live, and a reason would be her cat. Became tearful when discussing the recent sudden death of her friend.  Was given phone numbers for  [facility] Suicide Hotline and [facility] Drug and ETOH hotline, and reports she feels she would call these numbers at home if she had the need to talk, or had SI or the urge to use drugs.  Will be seen by Diabetes Educator today

## 2018-08-07 NOTE — PROGRESS NOTE BEHAVIORAL HEALTH - NSBHADMITIPOBSFT_PSY_A_CORE
calm, cooperative  reports will tell staff if she has SI while on the unit, reports will not act on SI on unit

## 2018-08-08 DIAGNOSIS — E11.9 TYPE 2 DIABETES MELLITUS WITHOUT COMPLICATIONS: ICD-10-CM

## 2018-08-08 LAB
GLUCOSE BLDC GLUCOMTR-MCNC: 100 MG/DL — HIGH (ref 70–99)
GLUCOSE BLDC GLUCOMTR-MCNC: 75 MG/DL — SIGNIFICANT CHANGE UP (ref 70–99)
GLUCOSE BLDC GLUCOMTR-MCNC: 81 MG/DL — SIGNIFICANT CHANGE UP (ref 70–99)
GLUCOSE BLDC GLUCOMTR-MCNC: 99 MG/DL — SIGNIFICANT CHANGE UP (ref 70–99)
GLUCOSE BLDC GLUCOMTR-MCNC: 99 MG/DL — SIGNIFICANT CHANGE UP (ref 70–99)

## 2018-08-08 PROCEDURE — 99232 SBSQ HOSP IP/OBS MODERATE 35: CPT

## 2018-08-08 RX ORDER — CLONAZEPAM 1 MG
1 TABLET ORAL THREE TIMES A DAY
Refills: 0 | Status: DISCONTINUED | OUTPATIENT
Start: 2018-08-08 | End: 2018-08-14

## 2018-08-08 RX ORDER — HYDROXYZINE HCL 10 MG
50 TABLET ORAL ONCE
Refills: 0 | Status: COMPLETED | OUTPATIENT
Start: 2018-08-08 | End: 2018-08-08

## 2018-08-08 RX ORDER — OXYCODONE HYDROCHLORIDE 5 MG/1
15 TABLET ORAL EVERY 8 HOURS
Refills: 0 | Status: DISCONTINUED | OUTPATIENT
Start: 2018-08-08 | End: 2018-08-14

## 2018-08-08 RX ADMIN — Medication 1 PATCH: at 08:59

## 2018-08-08 RX ADMIN — Medication 1 TABLET(S): at 17:24

## 2018-08-08 RX ADMIN — Medication 20 MILLIGRAM(S): at 17:24

## 2018-08-08 RX ADMIN — LISINOPRIL 2.5 MILLIGRAM(S): 2.5 TABLET ORAL at 08:56

## 2018-08-08 RX ADMIN — Medication 1 MILLIGRAM(S): at 22:03

## 2018-08-08 RX ADMIN — OXYCODONE HYDROCHLORIDE 15 MILLIGRAM(S): 5 TABLET ORAL at 06:55

## 2018-08-08 RX ADMIN — Medication 1 MILLIGRAM(S): at 13:02

## 2018-08-08 RX ADMIN — ARIPIPRAZOLE 10 MILLIGRAM(S): 15 TABLET ORAL at 08:56

## 2018-08-08 RX ADMIN — Medication 325 MILLIGRAM(S): at 08:56

## 2018-08-08 RX ADMIN — OXYCODONE HYDROCHLORIDE 15 MILLIGRAM(S): 5 TABLET ORAL at 15:36

## 2018-08-08 RX ADMIN — MIRTAZAPINE 30 MILLIGRAM(S): 45 TABLET, ORALLY DISINTEGRATING ORAL at 22:03

## 2018-08-08 RX ADMIN — INSULIN GLARGINE 30 UNIT(S): 100 INJECTION, SOLUTION SUBCUTANEOUS at 08:26

## 2018-08-08 RX ADMIN — NYSTATIN CREAM 1 APPLICATION(S): 100000 CREAM TOPICAL at 06:22

## 2018-08-08 RX ADMIN — OXYCODONE HYDROCHLORIDE 15 MILLIGRAM(S): 5 TABLET ORAL at 22:03

## 2018-08-08 RX ADMIN — ATORVASTATIN CALCIUM 20 MILLIGRAM(S): 80 TABLET, FILM COATED ORAL at 22:03

## 2018-08-08 RX ADMIN — OXYCODONE HYDROCHLORIDE 15 MILLIGRAM(S): 5 TABLET ORAL at 06:21

## 2018-08-08 RX ADMIN — Medication 1 TABLET(S): at 08:56

## 2018-08-08 RX ADMIN — BUDESONIDE AND FORMOTEROL FUMARATE DIHYDRATE 2 PUFF(S): 160; 4.5 AEROSOL RESPIRATORY (INHALATION) at 08:58

## 2018-08-08 RX ADMIN — Medication 50 MICROGRAM(S): at 06:17

## 2018-08-08 RX ADMIN — BUDESONIDE AND FORMOTEROL FUMARATE DIHYDRATE 2 PUFF(S): 160; 4.5 AEROSOL RESPIRATORY (INHALATION) at 22:14

## 2018-08-08 RX ADMIN — FAMOTIDINE 20 MILLIGRAM(S): 10 INJECTION INTRAVENOUS at 08:57

## 2018-08-08 RX ADMIN — Medication 650 MILLIGRAM(S): at 06:24

## 2018-08-08 RX ADMIN — Medication 100 MILLIGRAM(S): at 22:03

## 2018-08-08 RX ADMIN — GABAPENTIN 800 MILLIGRAM(S): 400 CAPSULE ORAL at 08:59

## 2018-08-08 RX ADMIN — FAMOTIDINE 20 MILLIGRAM(S): 10 INJECTION INTRAVENOUS at 22:03

## 2018-08-08 RX ADMIN — MONTELUKAST 10 MILLIGRAM(S): 4 TABLET, CHEWABLE ORAL at 06:22

## 2018-08-08 RX ADMIN — CYCLOBENZAPRINE HYDROCHLORIDE 10 MILLIGRAM(S): 10 TABLET, FILM COATED ORAL at 13:02

## 2018-08-08 RX ADMIN — CITALOPRAM 40 MILLIGRAM(S): 10 TABLET, FILM COATED ORAL at 22:04

## 2018-08-08 RX ADMIN — Medication 10 UNIT(S): at 17:24

## 2018-08-08 RX ADMIN — INSULIN GLARGINE 30 UNIT(S): 100 INJECTION, SOLUTION SUBCUTANEOUS at 22:03

## 2018-08-08 RX ADMIN — Medication 50 MILLIGRAM(S): at 06:17

## 2018-08-08 RX ADMIN — Medication 1 MILLIGRAM(S): at 08:57

## 2018-08-08 RX ADMIN — Medication 20 MILLIGRAM(S): at 06:17

## 2018-08-08 RX ADMIN — Medication 50 MILLIGRAM(S): at 12:06

## 2018-08-08 RX ADMIN — Medication 81 MILLIGRAM(S): at 08:56

## 2018-08-08 RX ADMIN — CYCLOBENZAPRINE HYDROCHLORIDE 10 MILLIGRAM(S): 10 TABLET, FILM COATED ORAL at 08:57

## 2018-08-08 RX ADMIN — CYCLOBENZAPRINE HYDROCHLORIDE 10 MILLIGRAM(S): 10 TABLET, FILM COATED ORAL at 22:04

## 2018-08-08 RX ADMIN — Medication 10 UNIT(S): at 08:31

## 2018-08-08 RX ADMIN — MIRTAZAPINE 30 MILLIGRAM(S): 45 TABLET, ORALLY DISINTEGRATING ORAL at 06:19

## 2018-08-08 RX ADMIN — GABAPENTIN 800 MILLIGRAM(S): 400 CAPSULE ORAL at 22:04

## 2018-08-08 RX ADMIN — NYSTATIN CREAM 1 APPLICATION(S): 100000 CREAM TOPICAL at 22:04

## 2018-08-08 RX ADMIN — MONTELUKAST 10 MILLIGRAM(S): 4 TABLET, CHEWABLE ORAL at 22:03

## 2018-08-08 RX ADMIN — OXYCODONE HYDROCHLORIDE 15 MILLIGRAM(S): 5 TABLET ORAL at 06:17

## 2018-08-08 RX ADMIN — OXYCODONE HYDROCHLORIDE 15 MILLIGRAM(S): 5 TABLET ORAL at 13:45

## 2018-08-08 RX ADMIN — Medication 1 PATCH: at 08:57

## 2018-08-08 NOTE — CONSULT NOTE ADULT - ASSESSMENT
Alert obese female with uncontrolled type 2  stopped DM  medication and medical management due to psychosocial history.  She is knowledgeable able to verbalize regime, denies need for supplies, voiced interest in diabetes support group at [place].

## 2018-08-08 NOTE — PROGRESS NOTE BEHAVIORAL HEALTH - RISK ASSESSMENT
Patient reports no SI or HI on 8/8.  Risk Factors:  hopeless, mood episode, substance abuse, perceived burden to family, chronic pain, access to means,   Protective factors: IDs one reason to live (her cat), future oriented, positive therapeutic relationships,

## 2018-08-08 NOTE — PROGRESS NOTE BEHAVIORAL HEALTH - SUMMARY
48 yo female with chronic pain and medical issues with limited primary and secondary supports presenting with low mood, ambivalence about her future who stated that she is unsafe at home due to recent suicide attempts and continues to request inpatient psychiatric admission she also admitted to recent crack/cocaine abuse. She signed voluntarily. (admission info)  Patient adjusting to unit.  Denies current SI.  Reported depressed, Celexa increased to 40mg and Abilify 5mg qam begun.  On 8/7, patient reports SI with plan, and Abilify increased to 10mg. No SI on 8/8

## 2018-08-08 NOTE — PROGRESS NOTE BEHAVIORAL HEALTH - NSBHFUPINTERVALHXFT_PSY_A_CORE
Patient seen, chart reviewed, case discussed in tx team meeting.  No significant interval events reported.  Patient reports some verbal conflicts with peers, but is generally in good control. She discusses wanting to go to partial program, as she feels groups are helpful to her.  Otherwise, same clinical presentation - no evidence of a significant primary mood/anxiety/psychotic illness or episode; presentation consistent with chronic pain/physical debility in a young female who has difficulty creating/maintaining successful relationships due to her Axis II personality pathology resulting in limited social supports and feeling lonely and wanting companionship and care taking. Reports no SI today. .  Patient reports she does want to go to partial program, but does not feel ready yet, but maybe soon.  Seen by Diabetes Educator

## 2018-08-08 NOTE — PROGRESS NOTE BEHAVIORAL HEALTH - OTHER
short haircut; multiple tattoos limited focused on her chronic pain  but not on an delusional level; focus also on pain medications - may have misused them before but denies it does not look sad or depressed, irritable at times, became tearful when discussing her mother reactive, full, not labile

## 2018-08-09 LAB
GLUCOSE BLDC GLUCOMTR-MCNC: 106 MG/DL — HIGH (ref 70–99)
GLUCOSE BLDC GLUCOMTR-MCNC: 132 MG/DL — HIGH (ref 70–99)
GLUCOSE BLDC GLUCOMTR-MCNC: 212 MG/DL — HIGH (ref 70–99)
GLUCOSE BLDC GLUCOMTR-MCNC: 66 MG/DL — LOW (ref 70–99)
GLUCOSE BLDC GLUCOMTR-MCNC: 78 MG/DL — SIGNIFICANT CHANGE UP (ref 70–99)

## 2018-08-09 PROCEDURE — 99232 SBSQ HOSP IP/OBS MODERATE 35: CPT

## 2018-08-09 RX ORDER — MULTIVIT-MIN/FERROUS GLUCONATE 9 MG/15 ML
1 LIQUID (ML) ORAL DAILY
Refills: 0 | Status: DISCONTINUED | OUTPATIENT
Start: 2018-08-09 | End: 2018-08-14

## 2018-08-09 RX ORDER — INSULIN LISPRO 100/ML
5 VIAL (ML) SUBCUTANEOUS
Refills: 0 | Status: DISCONTINUED | OUTPATIENT
Start: 2018-08-09 | End: 2018-08-14

## 2018-08-09 RX ORDER — MULTIVIT-MIN/FERROUS GLUCONATE 9 MG/15 ML
1 LIQUID (ML) ORAL
Qty: 0 | Refills: 0 | DISCHARGE
Start: 2018-08-09

## 2018-08-09 RX ADMIN — Medication 50 MICROGRAM(S): at 06:27

## 2018-08-09 RX ADMIN — NYSTATIN CREAM 1 APPLICATION(S): 100000 CREAM TOPICAL at 20:19

## 2018-08-09 RX ADMIN — GABAPENTIN 800 MILLIGRAM(S): 400 CAPSULE ORAL at 20:18

## 2018-08-09 RX ADMIN — CYCLOBENZAPRINE HYDROCHLORIDE 10 MILLIGRAM(S): 10 TABLET, FILM COATED ORAL at 09:18

## 2018-08-09 RX ADMIN — Medication 1 MILLIGRAM(S): at 12:22

## 2018-08-09 RX ADMIN — LISINOPRIL 2.5 MILLIGRAM(S): 2.5 TABLET ORAL at 09:19

## 2018-08-09 RX ADMIN — Medication 50 MILLIGRAM(S): at 13:42

## 2018-08-09 RX ADMIN — MIRTAZAPINE 30 MILLIGRAM(S): 45 TABLET, ORALLY DISINTEGRATING ORAL at 20:19

## 2018-08-09 RX ADMIN — CYCLOBENZAPRINE HYDROCHLORIDE 10 MILLIGRAM(S): 10 TABLET, FILM COATED ORAL at 20:19

## 2018-08-09 RX ADMIN — Medication 1 TABLET(S): at 09:16

## 2018-08-09 RX ADMIN — OXYCODONE HYDROCHLORIDE 15 MILLIGRAM(S): 5 TABLET ORAL at 13:39

## 2018-08-09 RX ADMIN — FAMOTIDINE 20 MILLIGRAM(S): 10 INJECTION INTRAVENOUS at 09:17

## 2018-08-09 RX ADMIN — GABAPENTIN 800 MILLIGRAM(S): 400 CAPSULE ORAL at 09:19

## 2018-08-09 RX ADMIN — INSULIN GLARGINE 30 UNIT(S): 100 INJECTION, SOLUTION SUBCUTANEOUS at 20:17

## 2018-08-09 RX ADMIN — Medication 1 TABLET(S): at 13:40

## 2018-08-09 RX ADMIN — Medication 10 UNIT(S): at 12:05

## 2018-08-09 RX ADMIN — Medication 81 MILLIGRAM(S): at 09:19

## 2018-08-09 RX ADMIN — ARIPIPRAZOLE 10 MILLIGRAM(S): 15 TABLET ORAL at 09:19

## 2018-08-09 RX ADMIN — Medication 1 PATCH: at 11:57

## 2018-08-09 RX ADMIN — FAMOTIDINE 20 MILLIGRAM(S): 10 INJECTION INTRAVENOUS at 20:19

## 2018-08-09 RX ADMIN — CITALOPRAM 40 MILLIGRAM(S): 10 TABLET, FILM COATED ORAL at 20:19

## 2018-08-09 RX ADMIN — Medication 325 MILLIGRAM(S): at 09:22

## 2018-08-09 RX ADMIN — OXYCODONE HYDROCHLORIDE 15 MILLIGRAM(S): 5 TABLET ORAL at 16:21

## 2018-08-09 RX ADMIN — Medication 1 TABLET(S): at 17:13

## 2018-08-09 RX ADMIN — Medication 20 MILLIGRAM(S): at 17:13

## 2018-08-09 RX ADMIN — Medication 325 MILLIGRAM(S): at 20:19

## 2018-08-09 RX ADMIN — MONTELUKAST 10 MILLIGRAM(S): 4 TABLET, CHEWABLE ORAL at 20:19

## 2018-08-09 RX ADMIN — Medication 1 MILLIGRAM(S): at 09:17

## 2018-08-09 RX ADMIN — OXYCODONE HYDROCHLORIDE 15 MILLIGRAM(S): 5 TABLET ORAL at 20:18

## 2018-08-09 RX ADMIN — ATORVASTATIN CALCIUM 20 MILLIGRAM(S): 80 TABLET, FILM COATED ORAL at 20:19

## 2018-08-09 RX ADMIN — INSULIN GLARGINE 30 UNIT(S): 100 INJECTION, SOLUTION SUBCUTANEOUS at 09:16

## 2018-08-09 RX ADMIN — OXYCODONE HYDROCHLORIDE 15 MILLIGRAM(S): 5 TABLET ORAL at 00:07

## 2018-08-09 RX ADMIN — NYSTATIN CREAM 1 APPLICATION(S): 100000 CREAM TOPICAL at 09:16

## 2018-08-09 RX ADMIN — Medication 100 MILLIGRAM(S): at 20:19

## 2018-08-09 RX ADMIN — OXYCODONE HYDROCHLORIDE 15 MILLIGRAM(S): 5 TABLET ORAL at 06:27

## 2018-08-09 RX ADMIN — Medication 10 UNIT(S): at 09:15

## 2018-08-09 RX ADMIN — Medication 4: at 17:14

## 2018-08-09 RX ADMIN — BUDESONIDE AND FORMOTEROL FUMARATE DIHYDRATE 2 PUFF(S): 160; 4.5 AEROSOL RESPIRATORY (INHALATION) at 20:18

## 2018-08-09 RX ADMIN — Medication 5 UNIT(S): at 17:15

## 2018-08-09 RX ADMIN — OXYCODONE HYDROCHLORIDE 15 MILLIGRAM(S): 5 TABLET ORAL at 20:20

## 2018-08-09 RX ADMIN — Medication 1 MILLIGRAM(S): at 20:18

## 2018-08-09 RX ADMIN — Medication 20 MILLIGRAM(S): at 06:27

## 2018-08-09 RX ADMIN — CYCLOBENZAPRINE HYDROCHLORIDE 10 MILLIGRAM(S): 10 TABLET, FILM COATED ORAL at 12:22

## 2018-08-09 NOTE — PROGRESS NOTE ADULT - SUBJECTIVE AND OBJECTIVE BOX
POCT Blood Glucose.: 78 mg/dL (08-09-18 @ 12:09)  POCT Blood Glucose.: 106 mg/dL (08-09-18 @ 07:51)  POCT Blood Glucose.: 75 mg/dL (08-08-18 @ 18:39)  POCT Blood Glucose.: 100 mg/dL (08-08-18 @ 17:22)  POCT Blood Glucose.: 99 mg/dL (08-08-18 @ 16:09)  POCT Blood Glucose.: 81 mg/dL (08-08-18 @ 12:01)  POCT Blood Glucose.: 99 mg/dL (08-08-18 @ 07:51)  POCT Blood Glucose.: 115 mg/dL (08-07-18 @ 20:03)  POCT Blood Glucose.: 192 mg/dL (08-07-18 @ 17:03)  POCT Blood Glucose.: 82 mg/dL (08-07-18 @ 12:25)  POCT Blood Glucose.: 122 mg/dL (08-07-18 @ 07:39)  POCT Blood Glucose.: 105 mg/dL (08-06-18 @ 20:01)  POCT Blood Glucose.: 220 mg/dL (08-06-18 @ 17:00)  POCT Blood Glucose.: 220 mg/dL (08-06-18 @ 14:32)      Chief Complaint: low glucose before meals    History: Type 2DM     DM MEDICATIONS  (STANDING):  dextrose 5%. 1000 milliLiter(s) (50 mL/Hr) IV Continuous <Continuous>  dextrose 50% Injectable 12.5 Gram(s) IV Push once  dextrose 50% Injectable 25 Gram(s) IV Push once  dextrose 50% Injectable 25 Gram(s) IV Push once  insulin glargine Injectable (LANTUS) 30 Unit(s) SubCutaneous two times a day  insulin lispro (HumaLOG) corrective regimen sliding scale   SubCutaneous three times a day before meals  insulin lispro Injectable (HumaLOG) 5 Unit(s) SubCutaneous three times a day before meals    DM MEDICATIONS  (PRN):  dextrose 40% Gel 15 Gram(s) Oral once PRN Blood Glucose LESS THAN 70 milliGRAM(s)/deciliter  glucagon  Injectable 1 milliGRAM(s) IntraMuscular once PRN Glucose LESS THAN 70 milligrams/deciliter    Allergies    apple (Unknown)  iodine (Unknown)  Peaches (Unknown)  raw fruits and vetables (Unknown)    Intolerances      Constitutional: No fever  HEENT: No pain  Cardiovascular: No chest pain, palpitation  Respiratory: No SOB, no cough  GI: No nausea, vomiting, abdominal pain  Endocrine: no polyuria, polydipsia    PHYSICAL EXAM:  VITALS: T(C): --  T(F): --97.9  HR: --70  BP: --153/85  RR: --18  SpO2: --  Wt(kg): --  GENERAL: NAD, well-groomed, well-developed  HEENT:  Atraumatic, Normocephalic, moist mucous membranes  RESPIRATORY: Clear to auscultation bilaterally; No rales, rhonchi, wheezing, or rubs  CARDIOVASCULAR: regular rate and Rythm  Respiratory: easy and unlabored  PSYCH: Alert and oriented x 3, normal affect, normal mood    Hemoglobin A1C, Whole Blood: 10.3 % <H> [4.0 - 5.6] (07-31-18 @ 11:06)  Hemoglobin A1C, Whole Blood: 11.0 % <H> [4.0 - 5.6] (07-22-18 @ 13:45)

## 2018-08-09 NOTE — PROGRESS NOTE ADULT - ASSESSMENT
Patient has type 2 diabetes with  fluctuating intake, blood glucose  < 100 with symtoms, on Lispro 10 units sq ac presently with Lantus 30 units sqhs fasting glucose WNL

## 2018-08-09 NOTE — PROGRESS NOTE BEHAVIORAL HEALTH - RISK ASSESSMENT
Patient reports no SI or HI on 8/8.  Risk Factors:  hopeless, mood episode, substance abuse, perceived burden to family, chronic pain, access to means,   Protective factors: IDs one reason to live (her cat), future oriented, positive therapeutic relationships, supportive family and friends

## 2018-08-09 NOTE — PROGRESS NOTE BEHAVIORAL HEALTH - SUMMARY
48 yo female with chronic pain and medical issues with limited primary and secondary supports presenting with low mood, ambivalence about her future who stated that she is unsafe at home due to recent suicide attempts and continues to request inpatient psychiatric admission she also admitted to recent crack/cocaine abuse. She signed voluntarily. (admission info)  Patient adjusting to unit.  Denies current SI.  Reported depressed, Celexa increased to 40mg and Abilify 5mg qam begun.  On 8/7, patient reports SI with plan, and Abilify increased to 10mg. No SI on 8/8 or 8/9

## 2018-08-09 NOTE — PROGRESS NOTE BEHAVIORAL HEALTH - NSBHFUPINTERVALHXFT_PSY_A_CORE
Patient seen, chart reviewed, case discussed in tx team meeting.  No significant interval events reported.  Patient reports some verbal conflicts with peers, but is generally in good control. She discusses wanting to go to partial program, as she feels groups are helpful to her.  Otherwise, same clinical presentation - no evidence of a significant primary mood/anxiety/psychotic illness or episode; presentation consistent with chronic pain/physical debility in a young female who has difficulty creating/maintaining successful relationships due to her Axis II personality pathology resulting in limited social supports and feeling lonely and wanting companionship and care taking. Reports no SI today. .  Patient reports she does want to go to partial program, and will be ready for discharge soon.  Reports feeling better. Feels the increase in Abilify has helped her mood. No Rx SE or sx TD/EPS are noted or reported.  Seen by Diabetes Educator again today, and also seen by Dr. Neumann for assistance in diabetes management and teaching.

## 2018-08-09 NOTE — PROGRESS NOTE BEHAVIORAL HEALTH - OTHER
reactive, full, not labile fair to limited reports improved mood short haircut; multiple tattoos focused on her chronic pain  but not on an delusional level; focus also on pain medications - may have misused them before but denies it does not look sad or depressed, less irritable

## 2018-08-10 LAB
GLUCOSE BLDC GLUCOMTR-MCNC: 102 MG/DL — HIGH (ref 70–99)
GLUCOSE BLDC GLUCOMTR-MCNC: 110 MG/DL — HIGH (ref 70–99)
GLUCOSE BLDC GLUCOMTR-MCNC: 93 MG/DL — SIGNIFICANT CHANGE UP (ref 70–99)

## 2018-08-10 PROCEDURE — 99232 SBSQ HOSP IP/OBS MODERATE 35: CPT

## 2018-08-10 RX ORDER — INSULIN GLARGINE 100 [IU]/ML
20 INJECTION, SOLUTION SUBCUTANEOUS
Refills: 0 | Status: DISCONTINUED | OUTPATIENT
Start: 2018-08-10 | End: 2018-08-14

## 2018-08-10 RX ADMIN — CITALOPRAM 40 MILLIGRAM(S): 10 TABLET, FILM COATED ORAL at 21:03

## 2018-08-10 RX ADMIN — Medication 325 MILLIGRAM(S): at 09:01

## 2018-08-10 RX ADMIN — Medication 1 TABLET(S): at 08:11

## 2018-08-10 RX ADMIN — MIRTAZAPINE 30 MILLIGRAM(S): 45 TABLET, ORALLY DISINTEGRATING ORAL at 21:04

## 2018-08-10 RX ADMIN — CYCLOBENZAPRINE HYDROCHLORIDE 10 MILLIGRAM(S): 10 TABLET, FILM COATED ORAL at 12:32

## 2018-08-10 RX ADMIN — Medication 81 MILLIGRAM(S): at 09:02

## 2018-08-10 RX ADMIN — Medication 1 MILLIGRAM(S): at 21:04

## 2018-08-10 RX ADMIN — Medication 100 MILLIGRAM(S): at 21:04

## 2018-08-10 RX ADMIN — BUDESONIDE AND FORMOTEROL FUMARATE DIHYDRATE 2 PUFF(S): 160; 4.5 AEROSOL RESPIRATORY (INHALATION) at 12:28

## 2018-08-10 RX ADMIN — Medication 20 MILLIGRAM(S): at 06:18

## 2018-08-10 RX ADMIN — Medication 1 MILLIGRAM(S): at 12:32

## 2018-08-10 RX ADMIN — Medication 50 MILLIGRAM(S): at 12:34

## 2018-08-10 RX ADMIN — ATORVASTATIN CALCIUM 20 MILLIGRAM(S): 80 TABLET, FILM COATED ORAL at 21:04

## 2018-08-10 RX ADMIN — Medication 1 PATCH: at 09:01

## 2018-08-10 RX ADMIN — CYCLOBENZAPRINE HYDROCHLORIDE 10 MILLIGRAM(S): 10 TABLET, FILM COATED ORAL at 09:01

## 2018-08-10 RX ADMIN — Medication 50 MICROGRAM(S): at 06:17

## 2018-08-10 RX ADMIN — OXYCODONE HYDROCHLORIDE 15 MILLIGRAM(S): 5 TABLET ORAL at 06:18

## 2018-08-10 RX ADMIN — INSULIN GLARGINE 20 UNIT(S): 100 INJECTION, SOLUTION SUBCUTANEOUS at 21:03

## 2018-08-10 RX ADMIN — LISINOPRIL 2.5 MILLIGRAM(S): 2.5 TABLET ORAL at 09:01

## 2018-08-10 RX ADMIN — GABAPENTIN 800 MILLIGRAM(S): 400 CAPSULE ORAL at 21:04

## 2018-08-10 RX ADMIN — Medication 1 TABLET(S): at 09:02

## 2018-08-10 RX ADMIN — Medication 5 UNIT(S): at 12:31

## 2018-08-10 RX ADMIN — Medication 1 PATCH: at 09:02

## 2018-08-10 RX ADMIN — FAMOTIDINE 20 MILLIGRAM(S): 10 INJECTION INTRAVENOUS at 21:04

## 2018-08-10 RX ADMIN — MONTELUKAST 10 MILLIGRAM(S): 4 TABLET, CHEWABLE ORAL at 21:04

## 2018-08-10 RX ADMIN — GABAPENTIN 800 MILLIGRAM(S): 400 CAPSULE ORAL at 09:01

## 2018-08-10 RX ADMIN — Medication 1 TABLET(S): at 17:17

## 2018-08-10 RX ADMIN — OXYCODONE HYDROCHLORIDE 15 MILLIGRAM(S): 5 TABLET ORAL at 06:24

## 2018-08-10 RX ADMIN — OXYCODONE HYDROCHLORIDE 15 MILLIGRAM(S): 5 TABLET ORAL at 14:05

## 2018-08-10 RX ADMIN — ARIPIPRAZOLE 10 MILLIGRAM(S): 15 TABLET ORAL at 09:01

## 2018-08-10 RX ADMIN — Medication 20 MILLIGRAM(S): at 17:17

## 2018-08-10 RX ADMIN — CYCLOBENZAPRINE HYDROCHLORIDE 10 MILLIGRAM(S): 10 TABLET, FILM COATED ORAL at 21:04

## 2018-08-10 RX ADMIN — INSULIN GLARGINE 20 UNIT(S): 100 INJECTION, SOLUTION SUBCUTANEOUS at 08:12

## 2018-08-10 RX ADMIN — NYSTATIN CREAM 1 APPLICATION(S): 100000 CREAM TOPICAL at 21:18

## 2018-08-10 RX ADMIN — Medication 5 UNIT(S): at 17:17

## 2018-08-10 RX ADMIN — Medication 650 MILLIGRAM(S): at 02:31

## 2018-08-10 RX ADMIN — OXYCODONE HYDROCHLORIDE 15 MILLIGRAM(S): 5 TABLET ORAL at 22:04

## 2018-08-10 RX ADMIN — FAMOTIDINE 20 MILLIGRAM(S): 10 INJECTION INTRAVENOUS at 09:02

## 2018-08-10 RX ADMIN — NYSTATIN CREAM 1 APPLICATION(S): 100000 CREAM TOPICAL at 06:51

## 2018-08-10 RX ADMIN — Medication 1 MILLIGRAM(S): at 09:01

## 2018-08-10 RX ADMIN — OXYCODONE HYDROCHLORIDE 15 MILLIGRAM(S): 5 TABLET ORAL at 21:04

## 2018-08-10 RX ADMIN — Medication 5 UNIT(S): at 08:11

## 2018-08-10 NOTE — PROGRESS NOTE BEHAVIORAL HEALTH - SUMMARY
46 yo female with chronic pain and medical issues with limited primary and secondary supports presenting with low mood, ambivalence about her future who stated that she is unsafe at home due to recent suicide attempts and continues to request inpatient psychiatric admission she also admitted to recent crack/cocaine abuse. She signed voluntarily. (admission info)  Patient adjusting to unit.  Denies current SI.  Reported depressed, Celexa increased to 40mg and Abilify 5mg qam begun.  On 8/7, patient reports SI with plan, and Abilify increased to 10mg. No SI since 8/7. Improved sx

## 2018-08-10 NOTE — PROGRESS NOTE BEHAVIORAL HEALTH - NSBHFUPINTERVALHXFT_PSY_A_CORE
Patient seen, chart reviewed, case discussed in tx team meeting.  No significant interval events reported.  Patient reports some verbal conflicts with peers, but is generally in good control. She discusses wanting to go to partial program, as she feels groups are helpful to her.  Otherwise, same clinical presentation - no evidence of a significant primary mood/anxiety/psychotic illness or episode; presentation consistent with chronic pain/physical debility in a young female who has difficulty creating/maintaining successful relationships due to her Axis II personality pathology resulting in limited social supports and feeling lonely and wanting companionship and care taking. Reports no SI today. .  Patient reports she does want to go to partial program, and will be ready for discharge soon.  Reports feeling better. Feels the increase in Abilify has helped her mood. No Rx SE or sx TD/EPS are noted or reported.

## 2018-08-10 NOTE — PROGRESS NOTE BEHAVIORAL HEALTH - RISK ASSESSMENT
Patient reports no SI or HI   Risk Factors: mood episode, substance abuse, perceived burden to family, chronic pain, access to means,   Protective factors: IDs reasons to live, future oriented, positive therapeutic relationships, supportive family and friends, hopeful about future

## 2018-08-10 NOTE — CONSULT NOTE ADULT - PROBLEM SELECTOR RECOMMENDATION 9
decrease lantus 20 units 2x/day  cont humalog 5 units 3x/day  cont humalog scale coverage  cont cons cho diet  cont fs bg monitoring  discussed addition of anti-diabetes agents to further reduce dosages of insulin
resume home diabetes medication  Diabetes Survival skills  goal range 100mg/dl to 180mg/dl   Monitor glucose trends  Refer to Diabetes Support Group at [place]  Contact information

## 2018-08-10 NOTE — CONSULT NOTE ADULT - SUBJECTIVE AND OBJECTIVE BOX
POCT Blood Glucose.: 99 mg/dL (08 Aug 2018 07:51)  POCT Blood Glucose.: 115 mg/dL (07 Aug 2018 20:03)  POCT Blood Glucose.: 192 mg/dL (07 Aug 2018 17:03)  POCT Blood Glucose.: 82 mg/dL (07 Aug 2018 12:25)  POCT Blood Glucose.: 122 mg/dL (07 Aug 2018 07:39)  POCT Blood Glucose.: 105 mg/dL (06 Aug 2018 20:01)  POCT Blood Glucose.: 220 mg/dL (06 Aug 2018 17:00)  POCT Blood Glucose.: 220 mg/dL (06 Aug 2018 14:32)  POCT Blood Glucose.: 79 mg/dL (06 Aug 2018 12:21)     ROS:  Cardiovascular: No chest Pain, palpitations  Respiratory No SOB, No cough  GI: No nausea,Vomiting,abdominal pain  Endocrine: no polyuria,polydipsia       Vital Signs Last 24 Hrs  T(C): --  T(F): --  HR: --83  BP: --120/80  BP(mean): --  RR: --16  SpO2: --      Physical Exam:  General:NAD well groomed well developed  Cardiovascular Regular rate and rhythm  Respiratory: easy and unlabored  Psych: Alert and oriented x3 normal affect normal mood    Overnight Events glycemic control    PAST MEDICAL & SURGICAL HISTORY:  High cholesterol  Post traumatic stress disorder  Depression  Diabetes Type 2  Cervical fusion syndrome      Social History  Smoker nicotine patch      apple (Unknown)  iodine (Unknown)  Peaches (Unknown)  raw fruits and vetables (Unknown)      DM MEDICATIONS  (STANDING):  dextrose 5%. 1000 milliLiter(s) (50 mL/Hr) IV Continuous <Continuous>  dextrose 50% Injectable 12.5 Gram(s) IV Push once  dextrose 50% Injectable 25 Gram(s) IV Push once  dextrose 50% Injectable 25 Gram(s) IV Push once  insulin glargine Injectable (LANTUS) 30 Unit(s) SubCutaneous two times a day  insulin lispro (HumaLOG) corrective regimen sliding scale   SubCutaneous three times a day before meals  insulin lispro Injectable (HumaLOG) 10 Unit(s) SubCutaneous three times a day before meals  nicotine -  14 mG/24Hr(s) Patch 1 patch Transdermal daily      Diabetes Home Medications:  Tresiba 50 units sq hs  insulin lispro (concentrated) 200 units/mL subcutaneous solution:  subcutaneous  (21 Jul 2018 13:32)      Diet: Consistent CArbohydrate    A1c: 11% now 10.3%     eGRF:  68
Patient is a 48y old  Female who presents with a chief complaint of     Reason For Consult: hypoglycemia     HPI:hx of dm - insulin requiring on lantus 30 units 2x/day plus humalog 5 units tid before meals, recently decreased from 10 units tid;       PAST MEDICAL & SURGICAL HISTORY:  High cholesterol  Post traumatic stress disorder  Depression  Diabetes  Cervical fusion syndrome      FAMILY HISTORY:        Social History:    MEDICATIONS  (STANDING):  ALBUTerol/ipratropium for Nebulization. 3 milliLiter(s) Nebulizer once  ARIPiprazole 10 milliGRAM(s) Oral daily  aspirin  chewable 81 milliGRAM(s) Oral daily  atorvastatin 20 milliGRAM(s) Oral at bedtime  buDESOnide  80 MICROgram(s)/formoterol 4.5 MICROgram(s) Inhaler 2 Puff(s) Inhalation two times a day  citalopram 40 milliGRAM(s) Oral at bedtime  clonazePAM Tablet 1 milliGRAM(s) Oral three times a day  cyclobenzaprine 10 milliGRAM(s) Oral three times a day  dextrose 5%. 1000 milliLiter(s) (50 mL/Hr) IV Continuous <Continuous>  dextrose 50% Injectable 12.5 Gram(s) IV Push once  dextrose 50% Injectable 25 Gram(s) IV Push once  dextrose 50% Injectable 25 Gram(s) IV Push once  famotidine    Tablet 20 milliGRAM(s) Oral two times a day  ferrous    sulfate 325 milliGRAM(s) Oral two times a day  furosemide    Tablet 20 milliGRAM(s) Oral two times a day  gabapentin 800 milliGRAM(s) Oral two times a day  insulin glargine Injectable (LANTUS) 30 Unit(s) SubCutaneous two times a day  insulin lispro (HumaLOG) corrective regimen sliding scale   SubCutaneous three times a day before meals  insulin lispro Injectable (HumaLOG) 5 Unit(s) SubCutaneous three times a day before meals  lactobacillus acidophilus 1 Tablet(s) Oral two times a day with meals  levothyroxine 50 MICROGram(s) Oral daily  lisinopril 2.5 milliGRAM(s) Oral daily  mirtazapine 30 milliGRAM(s) Oral at bedtime  montelukast 10 milliGRAM(s) Oral daily  multivitamin/minerals 1 Tablet(s) Oral daily  nicotine -  14 mG/24Hr(s) Patch 1 patch Transdermal daily  nystatin Powder 1 Application(s) Topical two times a day  oxyCODONE  ER Tablet 15 milliGRAM(s) Oral every 8 hours  traZODone 100 milliGRAM(s) Oral at bedtime    MEDICATIONS  (PRN):  acetaminophen   Tablet. 650 milliGRAM(s) Oral every 8 hours PRN headaches  bismuth subsalicylate Liquid 30 milliLiter(s) Oral every 2 hours PRN Diarrhea  dextrose 40% Gel 15 Gram(s) Oral once PRN Blood Glucose LESS THAN 70 milliGRAM(s)/deciliter  glucagon  Injectable 1 milliGRAM(s) IntraMuscular once PRN Glucose LESS THAN 70 milligrams/deciliter  hydrOXYzine hydrochloride 50 milliGRAM(s) Oral every 6 hours PRN Anxiety  loperamide 2 milliGRAM(s) Oral three times a day PRN Diarrhea  LORazepam   Injectable 2 milliGRAM(s) IntraMuscular every 6 hours PRN Agitation        T(C): --  HR: --  BP: --  RR: --  SpO2: --  Wt(kg): --    PHYSICAL EXAM:  GENERAL: NAD, well-groomed, well-developed  HEAD:  Atraumatic, Normocephalic  NECK: Supple, No JVD, Normal thyroid  CHEST/LUNG: Clear to percussion bilaterally; No rales, rhonchi, wheezing, or rubs  HEART: Regular rate and rhythm; No murmurs, rubs, or gallops  ABDOMEN: Soft, Nontender, Nondistended; Bowel sounds present  EXTREMITIES:  2+ Peripheral Pulses, No clubbing, cyanosis, or edema  SKIN: No rashes or lesions    CAPILLARY BLOOD GLUCOSE      POCT Blood Glucose.: 212 mg/dL (09 Aug 2018 16:56)  POCT Blood Glucose.: 132 mg/dL (09 Aug 2018 15:43)  POCT Blood Glucose.: 66 mg/dL (09 Aug 2018 15:09)  POCT Blood Glucose.: 78 mg/dL (09 Aug 2018 12:09)  POCT Blood Glucose.: 106 mg/dL (09 Aug 2018 07:51)          CMP:      Thyroid Function Tests:      Diabetes Tests:       Radiology:

## 2018-08-10 NOTE — PROGRESS NOTE BEHAVIORAL HEALTH - OTHER
reactive, full, not labile does not look sad or depressed, less irritable reports improved mood uses walker at times focused on her chronic pain  but not on an delusional level; focus also on pain medications - may have misused them before but denies it short haircut; multiple tattoos

## 2018-08-11 LAB
GLUCOSE BLDC GLUCOMTR-MCNC: 103 MG/DL — HIGH (ref 70–99)
GLUCOSE BLDC GLUCOMTR-MCNC: 165 MG/DL — HIGH (ref 70–99)
GLUCOSE BLDC GLUCOMTR-MCNC: 182 MG/DL — HIGH (ref 70–99)
GLUCOSE BLDC GLUCOMTR-MCNC: 83 MG/DL — SIGNIFICANT CHANGE UP (ref 70–99)

## 2018-08-11 RX ADMIN — Medication 325 MILLIGRAM(S): at 08:50

## 2018-08-11 RX ADMIN — Medication 650 MILLIGRAM(S): at 14:45

## 2018-08-11 RX ADMIN — Medication 1 MILLIGRAM(S): at 08:50

## 2018-08-11 RX ADMIN — CYCLOBENZAPRINE HYDROCHLORIDE 10 MILLIGRAM(S): 10 TABLET, FILM COATED ORAL at 12:12

## 2018-08-11 RX ADMIN — Medication 100 MILLIGRAM(S): at 21:36

## 2018-08-11 RX ADMIN — Medication 50 MICROGRAM(S): at 06:38

## 2018-08-11 RX ADMIN — INSULIN GLARGINE 20 UNIT(S): 100 INJECTION, SOLUTION SUBCUTANEOUS at 21:36

## 2018-08-11 RX ADMIN — Medication 5 UNIT(S): at 12:09

## 2018-08-11 RX ADMIN — Medication 50 MILLIGRAM(S): at 10:40

## 2018-08-11 RX ADMIN — FAMOTIDINE 20 MILLIGRAM(S): 10 INJECTION INTRAVENOUS at 21:37

## 2018-08-11 RX ADMIN — Medication 1 TABLET(S): at 07:59

## 2018-08-11 RX ADMIN — Medication 650 MILLIGRAM(S): at 04:31

## 2018-08-11 RX ADMIN — Medication 5 UNIT(S): at 17:18

## 2018-08-11 RX ADMIN — LISINOPRIL 2.5 MILLIGRAM(S): 2.5 TABLET ORAL at 08:54

## 2018-08-11 RX ADMIN — GABAPENTIN 800 MILLIGRAM(S): 400 CAPSULE ORAL at 08:50

## 2018-08-11 RX ADMIN — CYCLOBENZAPRINE HYDROCHLORIDE 10 MILLIGRAM(S): 10 TABLET, FILM COATED ORAL at 21:37

## 2018-08-11 RX ADMIN — ARIPIPRAZOLE 10 MILLIGRAM(S): 15 TABLET ORAL at 08:50

## 2018-08-11 RX ADMIN — CITALOPRAM 40 MILLIGRAM(S): 10 TABLET, FILM COATED ORAL at 21:37

## 2018-08-11 RX ADMIN — Medication 1 TABLET(S): at 08:50

## 2018-08-11 RX ADMIN — Medication 1 MILLIGRAM(S): at 12:11

## 2018-08-11 RX ADMIN — Medication 2: at 17:17

## 2018-08-11 RX ADMIN — NYSTATIN CREAM 1 APPLICATION(S): 100000 CREAM TOPICAL at 08:49

## 2018-08-11 RX ADMIN — GABAPENTIN 800 MILLIGRAM(S): 400 CAPSULE ORAL at 21:37

## 2018-08-11 RX ADMIN — OXYCODONE HYDROCHLORIDE 15 MILLIGRAM(S): 5 TABLET ORAL at 06:38

## 2018-08-11 RX ADMIN — Medication 650 MILLIGRAM(S): at 17:13

## 2018-08-11 RX ADMIN — Medication 5 UNIT(S): at 07:58

## 2018-08-11 RX ADMIN — OXYCODONE HYDROCHLORIDE 15 MILLIGRAM(S): 5 TABLET ORAL at 07:30

## 2018-08-11 RX ADMIN — ATORVASTATIN CALCIUM 20 MILLIGRAM(S): 80 TABLET, FILM COATED ORAL at 21:37

## 2018-08-11 RX ADMIN — Medication 81 MILLIGRAM(S): at 08:50

## 2018-08-11 RX ADMIN — BUDESONIDE AND FORMOTEROL FUMARATE DIHYDRATE 2 PUFF(S): 160; 4.5 AEROSOL RESPIRATORY (INHALATION) at 21:35

## 2018-08-11 RX ADMIN — Medication 1 PATCH: at 08:50

## 2018-08-11 RX ADMIN — Medication 1 TABLET(S): at 17:17

## 2018-08-11 RX ADMIN — CYCLOBENZAPRINE HYDROCHLORIDE 10 MILLIGRAM(S): 10 TABLET, FILM COATED ORAL at 08:50

## 2018-08-11 RX ADMIN — FAMOTIDINE 20 MILLIGRAM(S): 10 INJECTION INTRAVENOUS at 08:50

## 2018-08-11 RX ADMIN — Medication 1 MILLIGRAM(S): at 21:37

## 2018-08-11 RX ADMIN — OXYCODONE HYDROCHLORIDE 15 MILLIGRAM(S): 5 TABLET ORAL at 17:13

## 2018-08-11 RX ADMIN — Medication 20 MILLIGRAM(S): at 17:17

## 2018-08-11 RX ADMIN — OXYCODONE HYDROCHLORIDE 15 MILLIGRAM(S): 5 TABLET ORAL at 21:36

## 2018-08-11 RX ADMIN — INSULIN GLARGINE 20 UNIT(S): 100 INJECTION, SOLUTION SUBCUTANEOUS at 07:58

## 2018-08-11 RX ADMIN — OXYCODONE HYDROCHLORIDE 15 MILLIGRAM(S): 5 TABLET ORAL at 14:45

## 2018-08-11 RX ADMIN — MONTELUKAST 10 MILLIGRAM(S): 4 TABLET, CHEWABLE ORAL at 21:37

## 2018-08-11 RX ADMIN — Medication 1 PATCH: at 08:54

## 2018-08-11 RX ADMIN — Medication 650 MILLIGRAM(S): at 05:30

## 2018-08-11 RX ADMIN — MIRTAZAPINE 30 MILLIGRAM(S): 45 TABLET, ORALLY DISINTEGRATING ORAL at 21:37

## 2018-08-11 RX ADMIN — Medication 20 MILLIGRAM(S): at 06:38

## 2018-08-11 NOTE — PROGRESS NOTE ADULT - SUBJECTIVE AND OBJECTIVE BOX
CAPILLARY BLOOD GLUCOSE      POCT Blood Glucose.: 182 mg/dL (11 Aug 2018 17:09)  POCT Blood Glucose.: 83 mg/dL (11 Aug 2018 12:07)  POCT Blood Glucose.: 103 mg/dL (11 Aug 2018 07:52)      Vital Signs Last 24 Hrs  T(C): --  T(F): --  HR: --  BP: --  BP(mean): --  RR: --  SpO2: --    General: WN/WD NAD  Respiratory: CTA B/L  CV: RRR, S1S2, no murmurs, rubs or gallops  Abdominal: Soft, NT, ND +BS, Last BM  Extremities: No edema, + peripheral pulses             atorvastatin 20 milliGRAM(s) Oral at bedtime  dextrose 40% Gel 15 Gram(s) Oral once PRN  dextrose 50% Injectable 12.5 Gram(s) IV Push once  dextrose 50% Injectable 25 Gram(s) IV Push once  dextrose 50% Injectable 25 Gram(s) IV Push once  glucagon  Injectable 1 milliGRAM(s) IntraMuscular once PRN  insulin glargine Injectable (LANTUS) 20 Unit(s) SubCutaneous two times a day  insulin lispro (HumaLOG) corrective regimen sliding scale   SubCutaneous three times a day before meals  insulin lispro Injectable (HumaLOG) 5 Unit(s) SubCutaneous three times a day before meals  levothyroxine 50 MICROGram(s) Oral daily

## 2018-08-11 NOTE — PROGRESS NOTE ADULT - PROBLEM SELECTOR PLAN 1
Decrease lispro to 5 units sq ac  monitor glucose trends  goal range 100 to 180mg/dl   Endocrine consult
cont lantus 20 units 2x/day  cont humalog 5 units 3x/day   cont humalog mod dose scale coverage  cont cons cho diet

## 2018-08-12 LAB
GLUCOSE BLDC GLUCOMTR-MCNC: 123 MG/DL — HIGH (ref 70–99)
GLUCOSE BLDC GLUCOMTR-MCNC: 143 MG/DL — HIGH (ref 70–99)
GLUCOSE BLDC GLUCOMTR-MCNC: 173 MG/DL — HIGH (ref 70–99)
GLUCOSE BLDC GLUCOMTR-MCNC: 216 MG/DL — HIGH (ref 70–99)

## 2018-08-12 RX ADMIN — CYCLOBENZAPRINE HYDROCHLORIDE 10 MILLIGRAM(S): 10 TABLET, FILM COATED ORAL at 21:01

## 2018-08-12 RX ADMIN — OXYCODONE HYDROCHLORIDE 15 MILLIGRAM(S): 5 TABLET ORAL at 14:47

## 2018-08-12 RX ADMIN — INSULIN GLARGINE 20 UNIT(S): 100 INJECTION, SOLUTION SUBCUTANEOUS at 21:01

## 2018-08-12 RX ADMIN — Medication 50 MICROGRAM(S): at 06:06

## 2018-08-12 RX ADMIN — OXYCODONE HYDROCHLORIDE 15 MILLIGRAM(S): 5 TABLET ORAL at 23:08

## 2018-08-12 RX ADMIN — GABAPENTIN 800 MILLIGRAM(S): 400 CAPSULE ORAL at 08:58

## 2018-08-12 RX ADMIN — Medication 650 MILLIGRAM(S): at 08:06

## 2018-08-12 RX ADMIN — Medication 50 MILLIGRAM(S): at 18:25

## 2018-08-12 RX ADMIN — Medication 20 MILLIGRAM(S): at 06:06

## 2018-08-12 RX ADMIN — Medication 1 TABLET(S): at 07:57

## 2018-08-12 RX ADMIN — OXYCODONE HYDROCHLORIDE 15 MILLIGRAM(S): 5 TABLET ORAL at 14:53

## 2018-08-12 RX ADMIN — Medication 650 MILLIGRAM(S): at 23:10

## 2018-08-12 RX ADMIN — Medication 1 TABLET(S): at 08:58

## 2018-08-12 RX ADMIN — Medication 1 TABLET(S): at 16:50

## 2018-08-12 RX ADMIN — Medication 20 MILLIGRAM(S): at 16:50

## 2018-08-12 RX ADMIN — OXYCODONE HYDROCHLORIDE 15 MILLIGRAM(S): 5 TABLET ORAL at 06:06

## 2018-08-12 RX ADMIN — OXYCODONE HYDROCHLORIDE 15 MILLIGRAM(S): 5 TABLET ORAL at 21:00

## 2018-08-12 RX ADMIN — CITALOPRAM 40 MILLIGRAM(S): 10 TABLET, FILM COATED ORAL at 21:02

## 2018-08-12 RX ADMIN — Medication 1 PATCH: at 08:58

## 2018-08-12 RX ADMIN — FAMOTIDINE 20 MILLIGRAM(S): 10 INJECTION INTRAVENOUS at 08:58

## 2018-08-12 RX ADMIN — Medication 50 MILLIGRAM(S): at 12:21

## 2018-08-12 RX ADMIN — Medication 4: at 16:52

## 2018-08-12 RX ADMIN — Medication 650 MILLIGRAM(S): at 14:24

## 2018-08-12 RX ADMIN — MONTELUKAST 10 MILLIGRAM(S): 4 TABLET, CHEWABLE ORAL at 21:00

## 2018-08-12 RX ADMIN — FAMOTIDINE 20 MILLIGRAM(S): 10 INJECTION INTRAVENOUS at 21:02

## 2018-08-12 RX ADMIN — NYSTATIN CREAM 1 APPLICATION(S): 100000 CREAM TOPICAL at 21:00

## 2018-08-12 RX ADMIN — ATORVASTATIN CALCIUM 20 MILLIGRAM(S): 80 TABLET, FILM COATED ORAL at 21:00

## 2018-08-12 RX ADMIN — Medication 5 UNIT(S): at 12:16

## 2018-08-12 RX ADMIN — MIRTAZAPINE 30 MILLIGRAM(S): 45 TABLET, ORALLY DISINTEGRATING ORAL at 21:00

## 2018-08-12 RX ADMIN — Medication 1 PATCH: at 12:10

## 2018-08-12 RX ADMIN — Medication 1 MILLIGRAM(S): at 12:16

## 2018-08-12 RX ADMIN — CYCLOBENZAPRINE HYDROCHLORIDE 10 MILLIGRAM(S): 10 TABLET, FILM COATED ORAL at 12:16

## 2018-08-12 RX ADMIN — GABAPENTIN 800 MILLIGRAM(S): 400 CAPSULE ORAL at 21:01

## 2018-08-12 RX ADMIN — Medication 5 UNIT(S): at 07:57

## 2018-08-12 RX ADMIN — Medication 1 MILLIGRAM(S): at 08:58

## 2018-08-12 RX ADMIN — Medication 100 MILLIGRAM(S): at 21:01

## 2018-08-12 RX ADMIN — BUDESONIDE AND FORMOTEROL FUMARATE DIHYDRATE 2 PUFF(S): 160; 4.5 AEROSOL RESPIRATORY (INHALATION) at 08:58

## 2018-08-12 RX ADMIN — BUDESONIDE AND FORMOTEROL FUMARATE DIHYDRATE 2 PUFF(S): 160; 4.5 AEROSOL RESPIRATORY (INHALATION) at 21:00

## 2018-08-12 RX ADMIN — LISINOPRIL 2.5 MILLIGRAM(S): 2.5 TABLET ORAL at 08:58

## 2018-08-12 RX ADMIN — ARIPIPRAZOLE 10 MILLIGRAM(S): 15 TABLET ORAL at 08:58

## 2018-08-12 RX ADMIN — Medication 2: at 12:16

## 2018-08-12 RX ADMIN — CYCLOBENZAPRINE HYDROCHLORIDE 10 MILLIGRAM(S): 10 TABLET, FILM COATED ORAL at 08:58

## 2018-08-12 RX ADMIN — Medication 81 MILLIGRAM(S): at 08:58

## 2018-08-12 RX ADMIN — Medication 5 UNIT(S): at 16:52

## 2018-08-12 RX ADMIN — NYSTATIN CREAM 1 APPLICATION(S): 100000 CREAM TOPICAL at 08:59

## 2018-08-12 RX ADMIN — Medication 325 MILLIGRAM(S): at 08:58

## 2018-08-12 RX ADMIN — INSULIN GLARGINE 20 UNIT(S): 100 INJECTION, SOLUTION SUBCUTANEOUS at 07:56

## 2018-08-12 RX ADMIN — Medication 1 MILLIGRAM(S): at 21:00

## 2018-08-13 LAB
ANION GAP SERPL CALC-SCNC: 6 MMOL/L — SIGNIFICANT CHANGE UP (ref 5–17)
BUN SERPL-MCNC: 29 MG/DL — HIGH (ref 7–23)
CALCIUM SERPL-MCNC: 8.7 MG/DL — SIGNIFICANT CHANGE UP (ref 8.4–10.5)
CHLORIDE SERPL-SCNC: 103 MMOL/L — SIGNIFICANT CHANGE UP (ref 96–108)
CO2 SERPL-SCNC: 29 MMOL/L — SIGNIFICANT CHANGE UP (ref 22–31)
CREAT SERPL-MCNC: 1.11 MG/DL — SIGNIFICANT CHANGE UP (ref 0.5–1.3)
GLUCOSE BLDC GLUCOMTR-MCNC: 117 MG/DL — HIGH (ref 70–99)
GLUCOSE BLDC GLUCOMTR-MCNC: 123 MG/DL — HIGH (ref 70–99)
GLUCOSE BLDC GLUCOMTR-MCNC: 187 MG/DL — HIGH (ref 70–99)
GLUCOSE BLDC GLUCOMTR-MCNC: 337 MG/DL — HIGH (ref 70–99)
GLUCOSE SERPL-MCNC: 186 MG/DL — HIGH (ref 70–99)
HCT VFR BLD CALC: 31 % — LOW (ref 34.5–45)
HGB BLD-MCNC: 9.5 G/DL — LOW (ref 11.5–15.5)
MCHC RBC-ENTMCNC: 19.8 PG — LOW (ref 27–34)
MCHC RBC-ENTMCNC: 30.6 GM/DL — LOW (ref 32–36)
MCV RBC AUTO: 64.4 FL — LOW (ref 80–100)
NRBC # BLD: 0 /100 WBCS — SIGNIFICANT CHANGE UP (ref 0–0)
PLATELET # BLD AUTO: 250 K/UL — SIGNIFICANT CHANGE UP (ref 150–400)
POTASSIUM SERPL-MCNC: 4.3 MMOL/L — SIGNIFICANT CHANGE UP (ref 3.5–5.3)
POTASSIUM SERPL-SCNC: 4.3 MMOL/L — SIGNIFICANT CHANGE UP (ref 3.5–5.3)
RBC # BLD: 4.81 M/UL — SIGNIFICANT CHANGE UP (ref 3.8–5.2)
RBC # FLD: 16.1 % — HIGH (ref 10.3–14.5)
SODIUM SERPL-SCNC: 138 MMOL/L — SIGNIFICANT CHANGE UP (ref 135–145)
WBC # BLD: 9.68 K/UL — SIGNIFICANT CHANGE UP (ref 3.8–10.5)
WBC # FLD AUTO: 9.68 K/UL — SIGNIFICANT CHANGE UP (ref 3.8–10.5)

## 2018-08-13 PROCEDURE — 99232 SBSQ HOSP IP/OBS MODERATE 35: CPT

## 2018-08-13 RX ORDER — ARIPIPRAZOLE 15 MG/1
15 TABLET ORAL DAILY
Refills: 0 | Status: DISCONTINUED | OUTPATIENT
Start: 2018-08-13 | End: 2018-08-14

## 2018-08-13 RX ORDER — BACITRACIN AND POLYMYXIN B SULFATE 500; 10000 [USP'U]/G; [USP'U]/G
1 OINTMENT TOPICAL DAILY
Refills: 0 | Status: DISCONTINUED | OUTPATIENT
Start: 2018-08-13 | End: 2018-08-14

## 2018-08-13 RX ORDER — TRAZODONE HCL 50 MG
100 TABLET ORAL AT BEDTIME
Refills: 0 | Status: DISCONTINUED | OUTPATIENT
Start: 2018-08-13 | End: 2018-08-13

## 2018-08-13 RX ADMIN — LISINOPRIL 2.5 MILLIGRAM(S): 2.5 TABLET ORAL at 09:17

## 2018-08-13 RX ADMIN — Medication 5 UNIT(S): at 17:04

## 2018-08-13 RX ADMIN — BUDESONIDE AND FORMOTEROL FUMARATE DIHYDRATE 2 PUFF(S): 160; 4.5 AEROSOL RESPIRATORY (INHALATION) at 21:25

## 2018-08-13 RX ADMIN — GABAPENTIN 800 MILLIGRAM(S): 400 CAPSULE ORAL at 09:18

## 2018-08-13 RX ADMIN — OXYCODONE HYDROCHLORIDE 15 MILLIGRAM(S): 5 TABLET ORAL at 14:39

## 2018-08-13 RX ADMIN — CYCLOBENZAPRINE HYDROCHLORIDE 10 MILLIGRAM(S): 10 TABLET, FILM COATED ORAL at 21:22

## 2018-08-13 RX ADMIN — CYCLOBENZAPRINE HYDROCHLORIDE 10 MILLIGRAM(S): 10 TABLET, FILM COATED ORAL at 12:26

## 2018-08-13 RX ADMIN — Medication 8: at 12:25

## 2018-08-13 RX ADMIN — Medication 2: at 08:12

## 2018-08-13 RX ADMIN — ATORVASTATIN CALCIUM 20 MILLIGRAM(S): 80 TABLET, FILM COATED ORAL at 21:22

## 2018-08-13 RX ADMIN — Medication 1 MILLIGRAM(S): at 21:22

## 2018-08-13 RX ADMIN — Medication 650 MILLIGRAM(S): at 16:29

## 2018-08-13 RX ADMIN — OXYCODONE HYDROCHLORIDE 15 MILLIGRAM(S): 5 TABLET ORAL at 15:27

## 2018-08-13 RX ADMIN — NYSTATIN CREAM 1 APPLICATION(S): 100000 CREAM TOPICAL at 09:19

## 2018-08-13 RX ADMIN — INSULIN GLARGINE 20 UNIT(S): 100 INJECTION, SOLUTION SUBCUTANEOUS at 08:12

## 2018-08-13 RX ADMIN — Medication 1 MILLIGRAM(S): at 09:17

## 2018-08-13 RX ADMIN — Medication 50 MILLIGRAM(S): at 12:28

## 2018-08-13 RX ADMIN — Medication 1 PATCH: at 09:18

## 2018-08-13 RX ADMIN — MONTELUKAST 10 MILLIGRAM(S): 4 TABLET, CHEWABLE ORAL at 21:22

## 2018-08-13 RX ADMIN — Medication 5 UNIT(S): at 12:26

## 2018-08-13 RX ADMIN — Medication 81 MILLIGRAM(S): at 09:17

## 2018-08-13 RX ADMIN — Medication 100 MILLIGRAM(S): at 21:24

## 2018-08-13 RX ADMIN — FAMOTIDINE 20 MILLIGRAM(S): 10 INJECTION INTRAVENOUS at 09:17

## 2018-08-13 RX ADMIN — NYSTATIN CREAM 1 APPLICATION(S): 100000 CREAM TOPICAL at 21:20

## 2018-08-13 RX ADMIN — Medication 50 MICROGRAM(S): at 05:06

## 2018-08-13 RX ADMIN — OXYCODONE HYDROCHLORIDE 15 MILLIGRAM(S): 5 TABLET ORAL at 21:21

## 2018-08-13 RX ADMIN — CITALOPRAM 40 MILLIGRAM(S): 10 TABLET, FILM COATED ORAL at 21:22

## 2018-08-13 RX ADMIN — Medication 5 UNIT(S): at 08:13

## 2018-08-13 RX ADMIN — FAMOTIDINE 20 MILLIGRAM(S): 10 INJECTION INTRAVENOUS at 21:24

## 2018-08-13 RX ADMIN — GABAPENTIN 800 MILLIGRAM(S): 400 CAPSULE ORAL at 21:22

## 2018-08-13 RX ADMIN — OXYCODONE HYDROCHLORIDE 15 MILLIGRAM(S): 5 TABLET ORAL at 05:39

## 2018-08-13 RX ADMIN — BACITRACIN AND POLYMYXIN B SULFATE 1 APPLICATION(S): 500; 10000 OINTMENT TOPICAL at 21:32

## 2018-08-13 RX ADMIN — INSULIN GLARGINE 20 UNIT(S): 100 INJECTION, SOLUTION SUBCUTANEOUS at 21:19

## 2018-08-13 RX ADMIN — Medication 50 MILLIGRAM(S): at 16:32

## 2018-08-13 RX ADMIN — Medication 650 MILLIGRAM(S): at 17:40

## 2018-08-13 RX ADMIN — Medication 325 MILLIGRAM(S): at 09:17

## 2018-08-13 RX ADMIN — Medication 20 MILLIGRAM(S): at 16:29

## 2018-08-13 RX ADMIN — Medication 1 MILLIGRAM(S): at 12:26

## 2018-08-13 RX ADMIN — CYCLOBENZAPRINE HYDROCHLORIDE 10 MILLIGRAM(S): 10 TABLET, FILM COATED ORAL at 09:17

## 2018-08-13 RX ADMIN — Medication 1 TABLET(S): at 17:11

## 2018-08-13 RX ADMIN — Medication 650 MILLIGRAM(S): at 00:10

## 2018-08-13 RX ADMIN — Medication 1 TABLET(S): at 08:08

## 2018-08-13 RX ADMIN — ARIPIPRAZOLE 10 MILLIGRAM(S): 15 TABLET ORAL at 09:17

## 2018-08-13 RX ADMIN — Medication 1 TABLET(S): at 09:17

## 2018-08-13 RX ADMIN — MIRTAZAPINE 30 MILLIGRAM(S): 45 TABLET, ORALLY DISINTEGRATING ORAL at 21:22

## 2018-08-13 RX ADMIN — Medication 20 MILLIGRAM(S): at 05:39

## 2018-08-13 NOTE — PROGRESS NOTE BEHAVIORAL HEALTH - NSBHFUPINTERVALHXFT_PSY_A_CORE
Patient seen, chart reviewed, case discussed in tx team meeting.  No significant interval events reported.  Patient reports some verbal conflicts with peers, but is generally in good control. She discusses looking forward to starting the partial program, as she feels groups are helpful to her.  Otherwise, same clinical presentation - no evidence of a significant primary mood/anxiety/psychotic illness or episode; presentation consistent with chronic pain/physical debility in a young female who has difficulty creating/maintaining successful relationships due to her Axis II personality pathology resulting in limited social supports and feeling lonely and wanting companionship and care taking. Reports no SI today. Does report that she has chronic SI at times, but will call a friend or the suicide hotline or return to the hospital if she feels she would act on the SI . CAMS assessment done again today, with much improved score this time.  Patient reports psychological pain and stress re upcoming discharge and the household chores she needs to do.  Patient is able to ID reasons for living, such as her family, her cat, and herself. Patient is future oriented. ,:Reports feeling better. Feels the increase in Abilify has helped her mood. No Rx SE or sx TD/EPS are noted or reported. Patient's fingerstick was 337, and patient reports she does not always comply with her diabetic diet.  Support and teaching provided for adherence to consistent CHO diet for sx management with teach back verbalized

## 2018-08-13 NOTE — PROGRESS NOTE BEHAVIORAL HEALTH - NSBHADMITDANGERSELF_PSY_A_CORE
no  SI on 8/8/suicidal ideation with plan and means
suicidal ideation with plan and means
suicidal ideation with plan and means
denies SI on 7/27/suicidal ideation with plan and means
suicidal ideation with plan and means
no  SI on 8/10/suicidal ideation with plan and means
no  SI on 8/9/suicidal ideation with plan and means
suicidal ideation with plan and means
suicidal ideation with plan and means
denies SI on 7/27/suicidal ideation with plan and means
suicidal ideation with plan and means
no SI on 8/6
suicidal ideation with plan and means
no  SI on 8/13/suicidal ideation with plan and means

## 2018-08-13 NOTE — PROGRESS NOTE BEHAVIORAL HEALTH - OTHER
reports improved mood focused on her chronic pain  but not on an delusional level; focus also on pain medications - may have misused them before but denies it uses walker at times reactive, full, not labile does not look sad or depressed, less irritable short haircut; multiple tattoos

## 2018-08-13 NOTE — PROGRESS NOTE BEHAVIORAL HEALTH - SUMMARY
48 yo female with chronic pain and medical issues with limited primary and secondary supports presenting with low mood, ambivalence about her future who stated that she is unsafe at home due to recent suicide attempts and continues to request inpatient psychiatric admission she also admitted to recent crack/cocaine abuse. She signed voluntarily. (admission info)  Patient adjusting to unit.  Denies current SI.  Reported depressed, Celexa increased to 40mg and Abilify 5mg qam begun.  On 8/7, patient reports SI with plan, and Abilify increased to 10mg. No SI since 8/7. Improved sx

## 2018-08-13 NOTE — PROGRESS NOTE BEHAVIORAL HEALTH - PRN MEDS
Atarax for anxiety  Tylenol for pain
Atarax for anxiety  Tylenol for pain
Pepto Bismol  Immodium
Pepto Bismol
Atarax for anxiety  Tylenol for pain
Atarax for anxiety

## 2018-08-14 LAB
GLUCOSE BLDC GLUCOMTR-MCNC: 139 MG/DL — HIGH (ref 70–99)
GLUCOSE BLDC GLUCOMTR-MCNC: 172 MG/DL — HIGH (ref 70–99)

## 2018-08-14 PROCEDURE — 99239 HOSP IP/OBS DSCHRG MGMT >30: CPT

## 2018-08-14 RX ORDER — BACITRACIN ZINC 500 UNIT/G
1 OINTMENT IN PACKET (EA) TOPICAL DAILY
Refills: 0 | Status: DISCONTINUED | OUTPATIENT
Start: 2018-08-14 | End: 2018-08-14

## 2018-08-14 RX ORDER — LACTOBACILLUS ACIDOPHILUS 100MM CELL
1 CAPSULE ORAL
Qty: 30 | Refills: 0
Start: 2018-08-14 | End: 2018-08-28

## 2018-08-14 RX ORDER — OXYCODONE HYDROCHLORIDE 5 MG/1
1 TABLET ORAL
Qty: 21 | Refills: 0
Start: 2018-08-14 | End: 2018-08-20

## 2018-08-14 RX ORDER — MIRTAZAPINE 45 MG/1
1 TABLET, ORALLY DISINTEGRATING ORAL
Qty: 15 | Refills: 0
Start: 2018-08-14 | End: 2018-08-28

## 2018-08-14 RX ORDER — INSULIN GLARGINE 100 [IU]/ML
20 INJECTION, SOLUTION SUBCUTANEOUS
Qty: 1 | Refills: 0
Start: 2018-08-14 | End: 2018-08-28

## 2018-08-14 RX ORDER — FERROUS SULFATE 325(65) MG
1 TABLET ORAL
Qty: 30 | Refills: 0
Start: 2018-08-14 | End: 2018-08-28

## 2018-08-14 RX ORDER — CLONAZEPAM 1 MG
1 TABLET ORAL
Qty: 45 | Refills: 0
Start: 2018-08-14 | End: 2018-08-28

## 2018-08-14 RX ORDER — LISINOPRIL 2.5 MG/1
1 TABLET ORAL
Qty: 15 | Refills: 0
Start: 2018-08-14 | End: 2018-08-28

## 2018-08-14 RX ORDER — MONTELUKAST 4 MG/1
1 TABLET, CHEWABLE ORAL
Qty: 15 | Refills: 0
Start: 2018-08-14 | End: 2018-08-28

## 2018-08-14 RX ORDER — FAMOTIDINE 10 MG/ML
1 INJECTION INTRAVENOUS
Qty: 30 | Refills: 0
Start: 2018-08-14 | End: 2018-08-28

## 2018-08-14 RX ORDER — BUDESONIDE AND FORMOTEROL FUMARATE DIHYDRATE 160; 4.5 UG/1; UG/1
2 AEROSOL RESPIRATORY (INHALATION)
Qty: 1 | Refills: 0
Start: 2018-08-14 | End: 2018-08-28

## 2018-08-14 RX ORDER — INSULIN LISPRO 100/ML
5 VIAL (ML) SUBCUTANEOUS
Qty: 1 | Refills: 0
Start: 2018-08-14 | End: 2018-08-28

## 2018-08-14 RX ORDER — GABAPENTIN 400 MG/1
2 CAPSULE ORAL
Qty: 15 | Refills: 0
Start: 2018-08-14

## 2018-08-14 RX ORDER — OXYCODONE HYDROCHLORIDE 5 MG/1
15 TABLET ORAL EVERY 8 HOURS
Refills: 0 | Status: DISCONTINUED | OUTPATIENT
Start: 2018-08-14 | End: 2018-08-14

## 2018-08-14 RX ORDER — NYSTATIN CREAM 100000 [USP'U]/G
1 CREAM TOPICAL
Qty: 1 | Refills: 0
Start: 2018-08-14 | End: 2018-08-28

## 2018-08-14 RX ORDER — ARIPIPRAZOLE 15 MG/1
1 TABLET ORAL
Qty: 15 | Refills: 0
Start: 2018-08-14 | End: 2018-08-28

## 2018-08-14 RX ORDER — CITALOPRAM 10 MG/1
1 TABLET, FILM COATED ORAL
Qty: 15 | Refills: 0
Start: 2018-08-14 | End: 2018-08-28

## 2018-08-14 RX ORDER — LEVOTHYROXINE SODIUM 125 MCG
1 TABLET ORAL
Qty: 15 | Refills: 0
Start: 2018-08-14 | End: 2018-08-28

## 2018-08-14 RX ORDER — FUROSEMIDE 40 MG
1 TABLET ORAL
Qty: 30 | Refills: 0
Start: 2018-08-14 | End: 2018-08-28

## 2018-08-14 RX ORDER — ASPIRIN/CALCIUM CARB/MAGNESIUM 324 MG
1 TABLET ORAL
Qty: 15 | Refills: 0
Start: 2018-08-14 | End: 2018-08-28

## 2018-08-14 RX ORDER — HYDROXYZINE HCL 10 MG
1 TABLET ORAL
Qty: 12 | Refills: 0
Start: 2018-08-14 | End: 2018-08-16

## 2018-08-14 RX ORDER — CLONAZEPAM 1 MG
1 TABLET ORAL THREE TIMES A DAY
Refills: 0 | Status: DISCONTINUED | OUTPATIENT
Start: 2018-08-14 | End: 2018-08-14

## 2018-08-14 RX ORDER — CLONAZEPAM 1 MG
1 TABLET ORAL ONCE
Refills: 0 | Status: DISCONTINUED | OUTPATIENT
Start: 2018-08-14 | End: 2018-08-14

## 2018-08-14 RX ORDER — ATORVASTATIN CALCIUM 80 MG/1
1 TABLET, FILM COATED ORAL
Qty: 15 | Refills: 0
Start: 2018-08-14 | End: 2018-08-28

## 2018-08-14 RX ORDER — TRAZODONE HCL 50 MG
1 TABLET ORAL
Qty: 15 | Refills: 0
Start: 2018-08-14 | End: 2018-08-28

## 2018-08-14 RX ADMIN — Medication 5 UNIT(S): at 08:22

## 2018-08-14 RX ADMIN — OXYCODONE HYDROCHLORIDE 15 MILLIGRAM(S): 5 TABLET ORAL at 06:37

## 2018-08-14 RX ADMIN — CYCLOBENZAPRINE HYDROCHLORIDE 10 MILLIGRAM(S): 10 TABLET, FILM COATED ORAL at 10:01

## 2018-08-14 RX ADMIN — INSULIN GLARGINE 20 UNIT(S): 100 INJECTION, SOLUTION SUBCUTANEOUS at 08:17

## 2018-08-14 RX ADMIN — Medication 50 MICROGRAM(S): at 06:37

## 2018-08-14 RX ADMIN — OXYCODONE HYDROCHLORIDE 15 MILLIGRAM(S): 5 TABLET ORAL at 13:13

## 2018-08-14 RX ADMIN — Medication 1 MILLIGRAM(S): at 10:23

## 2018-08-14 RX ADMIN — Medication 5 UNIT(S): at 12:31

## 2018-08-14 RX ADMIN — Medication 325 MILLIGRAM(S): at 10:01

## 2018-08-14 RX ADMIN — CYCLOBENZAPRINE HYDROCHLORIDE 10 MILLIGRAM(S): 10 TABLET, FILM COATED ORAL at 12:03

## 2018-08-14 RX ADMIN — Medication 81 MILLIGRAM(S): at 10:02

## 2018-08-14 RX ADMIN — Medication 2: at 08:21

## 2018-08-14 RX ADMIN — Medication 1 TABLET(S): at 08:43

## 2018-08-14 RX ADMIN — Medication 1 PATCH: at 10:05

## 2018-08-14 RX ADMIN — ARIPIPRAZOLE 15 MILLIGRAM(S): 15 TABLET ORAL at 10:01

## 2018-08-14 RX ADMIN — GABAPENTIN 800 MILLIGRAM(S): 400 CAPSULE ORAL at 10:02

## 2018-08-14 RX ADMIN — FAMOTIDINE 20 MILLIGRAM(S): 10 INJECTION INTRAVENOUS at 10:02

## 2018-08-14 RX ADMIN — Medication 1 MILLIGRAM(S): at 12:03

## 2018-08-14 RX ADMIN — Medication 1 TABLET(S): at 10:09

## 2018-08-14 RX ADMIN — Medication 1 PATCH: at 10:00

## 2018-08-14 RX ADMIN — Medication 20 MILLIGRAM(S): at 06:37

## 2018-08-14 RX ADMIN — OXYCODONE HYDROCHLORIDE 15 MILLIGRAM(S): 5 TABLET ORAL at 06:42

## 2018-08-14 RX ADMIN — LISINOPRIL 2.5 MILLIGRAM(S): 2.5 TABLET ORAL at 10:01

## 2018-08-14 RX ADMIN — BUDESONIDE AND FORMOTEROL FUMARATE DIHYDRATE 2 PUFF(S): 160; 4.5 AEROSOL RESPIRATORY (INHALATION) at 10:00

## 2018-08-14 NOTE — PROGRESS NOTE BEHAVIORAL HEALTH - NSBHADMITIPOBSFT_PSY_A_CORE
calm, cooperative  reports will tell staff if she has SI while on the unit, reports will not act on SI on unit or at home

## 2018-08-14 NOTE — PROGRESS NOTE BEHAVIORAL HEALTH - ESTIMATED INTELLIGENCE
Average

## 2018-08-14 NOTE — PROGRESS NOTE BEHAVIORAL HEALTH - NSBHADMITMEDEDUDETAILS_A_CORE FT
Psychoeducation done re: need for Rx and aftercare adherence for sx management and relapse prevention with teach back verbalized.  Patient teaching also done re: consistent CHO diet, and healthy eating with diabetes placemat given, and foot care with literature given and teach back verbalized
Psychoeducation done re: potential benefits/SE of Atarax  with teach back verbalized
continue current medications - Celexa, trazodone, remeron; Neurology consult pending
Psychoeducation done again re: need for Rx and aftercare  adherence for sx management and relapse prevention  with teach back verbalized
continue current regimen
continue current regimen
Psychoeducation done re: potential benefits/SE of Abilify and increase in Celexa dose with teachback verbalized
will adjust her current regimen to reflect her home medications to ensure there is no significant drop in any dse/agent to induce withdrawals/discomfort
Patient teaching done re: need for adherence to consistent CHO diet for management of DM Sx with patient not answering.  Psychoeducation done re:  need for Rx adherence with teachback verballized.
Psychoeducation done re: benefits of Rx adherence for sx management with teachback verbalized
Patient teaching done re: need for adherence to consistent CHO diet for management of DM Sx with patient not answering.  Psychoeducation done re:  need for Rx adherence with teach back verballized.
continue current medications - Celexa, trazodone, remeron
Psychoeducation done re: need for Rx adherence for sx management and relapse prevention, and increase in Abilify dose with teach back verbalized
Psychoeducation done re: need for Rx adherence for sx management and relapse prevention  with teach back verbalized
Psychoeducation done again re: need for Rx and aftercare  adherence for sx management and relapse prevention  with teach back verbalized
Psychoeducation done again re: need for Rx and aftercare  adherence for sx management and relapse prevention  with teach back verbalized
Psychoeducation done re: need for Rx and aftercare  adherence for sx management and relapse prevention  with teach back verbalized

## 2018-08-14 NOTE — PROGRESS NOTE BEHAVIORAL HEALTH - SUMMARY
48 yo female with chronic pain and medical issues with limited primary and secondary supports presenting with low mood, ambivalence about her future who stated that she is unsafe at home due to recent suicide attempts and continues to request inpatient psychiatric admission she also admitted to recent crack/cocaine abuse. She signed voluntarily. (admission info)  Patient adjusting to unit.  Denies current SI.  Reported depressed, Celexa increased to 40mg and Abilify 5mg qam begun.  On 8/7, patient reports SI with plan, and Abilify increased to 10mg. No SI since 8/7. Improved sx.  Abilify increased to 15mg on 8/13.  Patient is stable for discharge

## 2018-08-14 NOTE — PROGRESS NOTE BEHAVIORAL HEALTH - THOUGHT CONTENT
Suicidality
Unremarkable/Other
Unremarkable
Unremarkable/Suicidality/Other
Unremarkable/Suicidality/Other
Unremarkable

## 2018-08-14 NOTE — PROGRESS NOTE BEHAVIORAL HEALTH - REMOTE MEMORY
Normal

## 2018-08-14 NOTE — PROGRESS NOTE BEHAVIORAL HEALTH - NSBHCONSORIP_PSY_A_CORE
Inpatient Admission...

## 2018-08-14 NOTE — PROGRESS NOTE BEHAVIORAL HEALTH - PERCEPTIONS
No abnormalities

## 2018-08-14 NOTE — PROGRESS NOTE BEHAVIORAL HEALTH - NSBHCHARTREVIEWLAB_PSY_A_CORE FT
blood glucose=197    WBC on 7/31=11  down from 13.5 on 7/27
blood glucose=189    WBC on 7/31=11  down from 13.5 on 7/27
blood glucose=170  reports episodes of diarrhea   BMP on 7/31=11  down from 13.5 on 7/27
blood glucose=81    WBC on 7/31=11  down from 13.5 on 7/27
blood glucose=197    WBC on 7/31=11  down from 13.5 on 7/27
blood glucose=189    WBC on 7/31=11  down from 13.5 on 7/27
blood glucose=79    WBC on 7/31=11  down from 13.5 on 7/27
blood glucose=93    WBC on 7/31=11  down from 13.5 on 7/27
blood glucose=82    WBC on 7/31=11  down from 13.5 on 7/27
blood glucose=78    WBC on 7/31=11  down from 13.5 on 7/27
glucose=332    WBC=13.05
JZ=[id number]
glucose=332    WBC=13.05
UY=808
blood glucose=151  reports episodes of diarrhea   for BMP in AM

## 2018-08-14 NOTE — PROGRESS NOTE BEHAVIORAL HEALTH - NS ED BHA AXIS I SECONDARY1 CODE FT
F60.9

## 2018-08-14 NOTE — PROGRESS NOTE BEHAVIORAL HEALTH - LANGUAGE
No abnormalities noted

## 2018-08-14 NOTE — PROGRESS NOTE BEHAVIORAL HEALTH - AFFECT CONGRUENCE
Congruent
Not congruent

## 2018-08-14 NOTE — PROGRESS NOTE BEHAVIORAL HEALTH - THOUGHT PROCESS
Linear
Linear/Other
Linear
Linear/Other
Linear/Other
Linear
Linear/Other
Linear/Normal reasoning/Other
Linear
Linear/Other
Linear/Normal reasoning/Other
Linear
Linear/Other

## 2018-08-14 NOTE — PROGRESS NOTE BEHAVIORAL HEALTH - MUSCLE TONE / STRENGTH
Normal muscle tone/strength

## 2018-08-14 NOTE — PROGRESS NOTE BEHAVIORAL HEALTH - AFFECT RANGE
Other
Constricted
Full/Other
Full/Other
Constricted
Other
Full/Other
Constricted
Full/Other
Other
Constricted

## 2018-08-14 NOTE — PROGRESS NOTE BEHAVIORAL HEALTH - NSBHADMITCOORDWITH_PSY_A_CORE
discussed with Dr. Mcgovern/medical staff/social work
discussed with Dr. Harvey/medical staff/social work
discussed with Dr. Mcgovern/medical staff/social work
discussed with Dr. Harvey/medical staff/social work
discussed with Dr. Mcgovern/medical staff/social work
discussed with Dr. Harvey/medical staff/social work

## 2018-08-14 NOTE — PROGRESS NOTE BEHAVIORAL HEALTH - NS ED BHA MED ROS PSYCHIATRIC
See HPI

## 2018-08-14 NOTE — PROGRESS NOTE BEHAVIORAL HEALTH - NSBHADMITIPREASON_PSY_A_CORE
Danger to self; mental illness expected to respond to inpatient care
other reason
Danger to self; mental illness expected to respond to inpatient care
Danger to self; mental illness expected to respond to inpatient care

## 2018-08-14 NOTE — PROGRESS NOTE BEHAVIORAL HEALTH - PRIMARY DX
Mood disorder due to a general medical condition

## 2018-08-14 NOTE — PROGRESS NOTE BEHAVIORAL HEALTH - NS ED BHA AXIS I PRIMARY CODE FT
F06.30

## 2018-08-14 NOTE — PROGRESS NOTE BEHAVIORAL HEALTH - NSBHFUPVIOL_PSY_A_CORE
none known

## 2018-08-14 NOTE — PROGRESS NOTE BEHAVIORAL HEALTH - NSBHFUPINTERVALCCFT_PSY_A_CORE
"I am not suicidal, I am ready to go home.  If I feel suicidal at home, I will call the Suicide Hotline or come back to the hospital"
"I have some family events coming up soon.  I want to be discharged soon, because I want to go to see my family"
"I am ready to start partial tomorrow, and I want to go home"
"I am feeling happier today, and I think I will be ready for discharge soon"
"I have chronic pain"
"I was upset last night; I'm ok now; you can call me Pam"
"I am ready to go home soon.  I am looking forward to time with my family"
"I want to go to a partial program.  I think they can help me"
"I am going to lead a healthier lifestyle and eat more healthy foods.  I want to feel better and to be happy"
"I have been reading about healthy foods"
" I am having trouble dealing with my anxiety.  I was thinking about how I was fired when my mother was dying"
"I would be suicidal if I was at home.  I could overdose on potassium or insulin, but I am not sure if I would do it"
"I didn't go to groups this morning--I was too tired"
"I have a lot of issues"
" I had a good day yesterday.  My sister visited and it was my birthday"
" I am feeling depressed.  I want to get well so I can make it to a shower for my niece"

## 2018-08-14 NOTE — PROGRESS NOTE BEHAVIORAL HEALTH - GAIT / STATION
Normal gait / station
Other
Normal gait / station
Other
Normal gait / station
Normal gait / station
Other
Normal gait / station

## 2018-08-14 NOTE — PROGRESS NOTE BEHAVIORAL HEALTH - BODY HABITUS
Well nourished/Overweight
Overweight
Well nourished/Overweight
Overweight
Overweight
Well nourished/Overweight
Well nourished/Overweight
Overweight

## 2018-08-14 NOTE — PROGRESS NOTE BEHAVIORAL HEALTH - NS ED BHA AXIS I SECONDARY2 CODE FT
F43.20

## 2018-08-14 NOTE — PROGRESS NOTE BEHAVIORAL HEALTH - NSBHADMITCOUNSEL_PSY_A_CORE
diagnostic results/impressions and/or recommended studies/importance of adherence to chosen treatment/risk factor reduction
risks and benefits of treatment options/importance of adherence to chosen treatment
diagnostic results/impressions and/or recommended studies/importance of adherence to chosen treatment/risk factor reduction
risks and benefits of treatment options/importance of adherence to chosen treatment/risk factor reduction
diagnostic results/impressions and/or recommended studies/importance of adherence to chosen treatment/risk factor reduction
diagnostic results/impressions and/or recommended studies/importance of adherence to chosen treatment/risk factor reduction
risks and benefits of treatment options/importance of adherence to chosen treatment
diagnostic results/impressions and/or recommended studies/importance of adherence to chosen treatment/risk factor reduction
diagnostic results/impressions and/or recommended studies/risks and benefits of treatment options/instructions for management, treatment and follow up/importance of adherence to chosen treatment/risk factor reduction
diagnostic results/impressions and/or recommended studies/importance of adherence to chosen treatment/other...
risks and benefits of treatment options/instructions for management, treatment and follow up/importance of adherence to chosen treatment
diagnostic results/impressions and/or recommended studies/importance of adherence to chosen treatment/risk factor reduction

## 2018-08-14 NOTE — PROGRESS NOTE BEHAVIORAL HEALTH - SECONDARY DX1
Personality disorder

## 2018-08-14 NOTE — PROGRESS NOTE BEHAVIORAL HEALTH - BEHAVIOR
Cooperative/Other
Cooperative
Cooperative/Other
Cooperative
Cooperative/Other
Cooperative/Other

## 2018-08-14 NOTE — PROGRESS NOTE BEHAVIORAL HEALTH - AXIS III
L3-L5 laminectomy and L4-S1 posterior spinal fusion w/ rods and bone graft; Chronic Pain Syndrome; IDDM; HLD; HTN; hx of Proteinuria, Diabetic Nephropathy; anemia; carpal tunnel syndrome; hysterectomy; hx of MRSA infection after abdominal gyn surgery requiring home IV antibiotics for 6-8 weeks

## 2018-08-14 NOTE — PROGRESS NOTE BEHAVIORAL HEALTH - NSBHPTASSESSDT_PSY_A_CORE
07-Aug-2018 14:22
06-Aug-2018 12:41
28-Jul-2018
05-Aug-2018
03-Aug-2018 14:09
26-Jul-2018
10-Aug-2018 15:39
14-Aug-2018
09-Aug-2018 13:40
27-Jul-2018 15:00
13-Aug-2018 14:44
31-Jul-2018 12:13
29-Jul-2018
02-Aug-2018
30-Jul-2018 11:28
08-Aug-2018 14:04
01-Aug-2018 12:50

## 2018-08-14 NOTE — PROGRESS NOTE BEHAVIORAL HEALTH - NSBHFUPMEDSE_PSY_A_CORE
None known
PT had ears pierced 2 months ago. Earring in L ear is still in place but screw-on back no longer visible. Posterior lobe appears to be healed over. No s/s of infection. Pt denies pain.
None known

## 2018-08-14 NOTE — PROGRESS NOTE BEHAVIORAL HEALTH - NSBHFUPINTERVALHXFT_PSY_A_CORE
Patient seen, chart reviewed, case discussed in tx team meeting.  All team members feel patient is safe and appropriate for discharge today.  Also spoke with patient's sister on the phone with patient present, and she is ok with picking patient up today.  No significant interval events reported.  Patient reports some verbal conflicts with peers, but is generally in good control. She discusses looking forward to starting the partial program, as she feels groups are helpful to her.  Otherwise, same clinical presentation - no evidence of a significant primary mood/anxiety/psychotic illness or episode; presentation consistent with chronic pain/physical debility in a young female who has difficulty creating/maintaining successful relationships due to her Axis II personality pathology resulting in limited social supports and feeling lonely and wanting companionship and care taking. Reports no SI today. Does report that she has chronic SI at times, but will call a friend or the suicide hotline or return to the hospital if she feels she would act on the SI . CAMS assessment done again on 8/13,, with much improved score this time.  Patient reports psychological pain and stress re upcoming discharge and the household chores she needs to do.  Patient is able to ID reasons for living, such as her family, her cat, and herself. Patient is future oriented. ,:Reports feeling better. Feels the increase in Abilify has helped her mood. No Rx SE or sx TD/EPS are noted or reported. Patient reports she feels ready for discharge, and will call [facility], or Suicide Hotline if she has SI, also reports she would return to the hospital for help  "I don't want to die, I want to live" Patient has phone numbers for [facility], Suicide Hotline, and [facility] Drug/ETOH hotline in case she feels she might use street drugs. "I want to stay away from street drugs"   Patient reports will follow up with her medical doctor this week, and will maintain her diabetic diet.  Discharge patient to home today, to start [facility] Partial  Program tomorrow.

## 2018-08-14 NOTE — PROGRESS NOTE BEHAVIORAL HEALTH - DETAILS
chronic pain

## 2018-08-14 NOTE — PROGRESS NOTE BEHAVIORAL HEALTH - NSBHADMITMEDEDU_PSY_A_CORE
yes...

## 2018-08-14 NOTE — PROGRESS NOTE BEHAVIORAL HEALTH - OTHER
does not look sad or depressed, less irritable uses walker at times reports improved mood reactive, full, not labile short haircut; multiple tattoos focus is on going home, and starting partial program

## 2018-08-14 NOTE — PROGRESS NOTE BEHAVIORAL HEALTH - NSBHLEGALSTATUS_PSY_A_CORE
9.13 (Voluntary)

## 2018-08-14 NOTE — PROGRESS NOTE BEHAVIORAL HEALTH - NSBHCHARTREVIEWIMAGING_PSY_A_CORE FT
CT abdomen: L3-L5 laminectomy and L4-S1 posterior spinal fusion. Bilateral L4, right L5 and bilateral S1 transpedicular screws with interconnecting rods and bone graft

## 2018-08-14 NOTE — PROGRESS NOTE BEHAVIORAL HEALTH - SECONDARY DX2
Adjustment disorder

## 2018-08-14 NOTE — PROGRESS NOTE BEHAVIORAL HEALTH - NS ED BHA MED ROS RESPIRATORY
No complaints

## 2018-10-04 NOTE — ED ADULT NURSE NOTE - QUALITY
After Visit Summary   10/4/2018    Nicholas M Dubina    MRN: [id number]           Patient Information     Date Of Birth          1999        Visit Information        Provider Department      10/4/2018 1:50 PM Farzana Littlejohn MD Peds Diabetes        Today's Diagnoses     Type 1 diabetes mellitus with hyperglycemia (H)    -  1      Care Instructions         Thank you for choosing [facility].    It was a pleasure to see you today!     Alexia Robles MD, Jessie Timmons NP,  Karen Chance MD, Maurice Littlejohn MD, Mike Liu MD,  Hina Gooden MD [facility],  Farheen Ingram, RN CNP    [place]:  Setph Roth MD,  Saturnino Interiano MD, Reinier Wiseman MD    Visit Goals:  1. Your HbA1c today is 10.3 from 10.7. Way to go! (confetti emoji) You are going in the right direction, but there is still room to improve, so keep on working.   ---Goal HbA1c for all children up to 19 years of age (based on ADA goals):   < 7.5%.  ---Goal HbA1c for adults (age 19+):  HbA1c <7%    2. Changes to diabetes plan: We are not going to make any changes to your insulin regimen today. Keep working on bolusing more frequently. This will be easier once the Dexcom arrives. Please call the clinic at any time if you need help with insurance or other issues. Please keep in mind that when you start bolusing more you may need to cut back on your basal dosing as we get more of a sense what your insulin needs are. Always feel free to call the clinic if you are having issues with your insulin dosing once you start bolusing more.     YOUR INSULIN DOSE IS:  Insulin pump:  Medtronic MiniMed 530  12 AM: 1.4  6 A 1.5  11A 1.7  10 PM 1.4  Carb ratio:    12A: 6  6A: 6    11A: 6  Correction    12A 1:30  Targets  12A 100-150  7A   9P 100-150  Insulin Action: 3 hours  Insulin administration site(s): flanks, Sure-T      3. We recommend checking blood sugars 4-6 times per day, every day  1. Goal blood sugars:   fasting,   pre-meal, <180 2 hours after a meal.    2. Higher fasting and bedtime numbers may be targeted for children under 5 years of age.  4. Yearly labs next due: 8/2019  5. Eye Doctor visit next due: now, please schedule this  6. We recommend every patient with diabetes receive the flu shot every year.  7. Follow up in 1 month.    Sick Day Plan:  Pump Failure:  IF YOUR PUMP FAILS AND YOU NEED TO TAKE BASAL INSULIN (GLARGINE, BASAGLAR, TRESIBA, LEVEMIR) THE DOSE IS: 37 units  Remember when you restart your pump that the basal insulin lasts 24 hours so wait until 24 hours is up before starting your pump basal rate.Call on-call endocrinologist or diabetes nurse if this happens. You should also plan to call the pump company right away to troubleshoot the pump failure.    Hyperglycemia (high blood glucose):  Ketones:  Check urine/blood ketones if Oziel is sick, vomiting, or if blood glucose is above 240 twice in a row. Call on-call endocrinologist or diabetes nurse if ketones are present.    Hypoglycemia (low blood glucose):  If blood glucose is 60 to 80:  1.  Eat or drink 1 carb unit (15 grams carbohydrate).   One carb unit equals:   - 1/2 cup (4 ounces) juice or regular soda pop, or   - 1 cup (8 ounces) milk, or   - 3 to 4 glucose tablets  2.  Re-check your blood glucose in 15 minutes.  3.  Repeat these steps every 15 minutes until your blood glucose is above 100.    If blood glucose is under 60:  1.  Eat or drink 2 carb units (30 grams carbohydrate).  Two carb units equal:   - 1 cup (8 ounces) juice or regular soda pop, or   - 2 cups (16 ounces) milk, or   - 6 to 8 glucose tablets.  2.  Re-check your blood glucose in 15 minutes.  3.  Repeat these steps every 15 minutes until your blood glucose is above 100.      If you had any blood work, imaging or other tests:  Normal test results will be mailed to your home address in a letter.  Abnormal results will be communicated to you via phone call / letter.  Please allow 2 weeks  for processing/interpretation of most lab work.  For urgent issues that cannot wait until the next business day, call [phone] and ask for the Pediatric Endocrinologist on call.    You may contact your diabetes nurse with any questions:  Randi Dasilva RN, [phone]  Elvia Navarro RN  [phone]     Please leave a message on one line only. Calls will be returned as soon as possible.  Requests for results will be returned after your physician has been able to review the results.  Main Office: [phone]  Fax: [phone]  Medication renewal requests must be faxed to the main office by your pharmacy.  Allow 3-4 days for completion.     Scheduling:    Pediatric Call Center for [facility], [phone]  [facility], 9th floor [phone]  Infusion Center: [phone] (for stimulation tests)  Radiology/ [phone]     Services:   [phone]     We encourage you to sign up for [employer] for easy communication with us.  Sign up at the clinic  or go to [email or web address].     Please try the Passport to [facility]) phone application for Virtual Tours, Procedure Preparation, Resources, Preparation for Hospital Stay and the Coloring Board.             Follow-ups after your visit        Follow-up notes from your care team     Return in about 4 weeks (around 2018).      Who to contact     Please call your clinic at [phone] to:    Ask questions about your health    Make or cancel appointments    Discuss your medicines    Learn about your test results    Speak to your doctor            Additional Information About Your Visit        InfoBasishart Information     [employer] gives you secure access to your electronic health record. If you see a primary care provider, you can also send messages to your care team and make appointments. If you have questions, please call your primary care clinic.  If you do not have a  "primary care provider, please call [phone] and they will assist you.      [employer] is an electronic gateway that provides easy, online access to your medical records. With [employer], you can request a clinic appointment, read your test results, renew a prescription or communicate with your care team.     To access your existing account, please contact your [facility] or call [phone] for assistance.        Care EveryWhere ID     This is your Care EveryWhere ID. This could be used by other organizations to access your [place] medical records  [id number]        Your Vitals Were     Pulse Height BMI (Body Mass Index)             89 5' 10.24\" (178.4 cm) 21.52 kg/m2          Blood Pressure from Last 3 Encounters:   10/04/18 122/67   08/30/18 122/70   10/05/17 94/60    Weight from Last 3 Encounters:   10/04/18 151 lb 0.2 oz (68.5 kg) (51 %)*   08/30/18 153 lb 3.5 oz (69.5 kg) (55 %)*   10/05/17 145 lb 8.1 oz (66 kg) (50 %)*     * Growth percentiles are based on [facility] 2-20 Years data.              We Performed the Following     Hemoglobin A1c -POINT OF CARE     Hemoglobin A1c POCT          Today's Medication Changes          These changes are accurate as of 10/4/18  2:37 PM.  If you have any questions, ask your nurse or doctor.               These medicines have changed or have updated prescriptions.        Dose/Directions    insulin glargine 100 UNIT/ML injection   Commonly known as:  LANTUS SOLOSTAR   This may have changed:    - how to take this  - additional instructions   Used for:  Type I (juvenile type) diabetes mellitus without mention of complication, not stated as uncontrolled        Patient to use 25 Units once daily when off the pump   Quantity:  1 Month   Refills:  12         Stop taking these medicines if you haven't already. Please contact your care team if you have questions.     acetone (urine) test strip   Commonly known as:  KETOSTIX   Stopped by:  Elvia Navarro "                    Primary Care Provider Office Phone # Fax #    [facility] [phone] [address] [place] [address]        Equal Access to Services     AGUSTÍN LANCASTER : Hadkyle aad ku hadcolt Rodriguez, wacarolineda luqman, qajacksonta kaana cristinada amaya, joyce upton lageraldonguyen macdonald. So [facility] [phone].    ATENCIÓN: Si habla español, tiene a garay disposición servicios gratuitos de asistencia lingüística. Llame al [phone].    We comply with applicable federal civil rights laws and [place] laws. We do not discriminate on the basis of race, color, national origin, age, disability, sex, sexual orientation, or gender identity.            Thank you!     Thank you for choosing [facility]  for your care. Our goal is always to provide you with excellent care. Hearing back from our patients is one way we can continue to improve our services. Please take a few minutes to complete the written survey that you may receive in the mail after your visit with us. Thank you!             Your Updated Medication List - Protect others around you: Learn how to safely use, store and throw away your medicines at www.disposemymeds.org.          This list is accurate as of 10/4/18  2:37 PM.  Always use your most recent med list.                   Brand Name Dispense Instructions for use Diagnosis    acetaminophen 500 MG tablet    TYLENOL    20 tablet    Take 1 tablet (500 mg) by mouth every 6 hours as needed    Emesis, DKA (diabetic ketoacidoses) (H)       betamethasone valerate 0.1 % ointment    VALISONE    45 g    Apply topically 2 times daily        blood glucose monitoring test strip    no brand specified    300 strip    Please dispense Contour Next Test Strips Use to test blood sugars 8 times daily or as directed    Type 1 diabetes mellitus with hyperglycemia (H)       chlorhexidine 4 % liquid    HIBICLENS    120 mL    Use to clean off skin prior to pump site insertions.     Diabetes mellitus, type 1       FLINTSTONES SOUR GUMMIES PO      Take 1 chew tab by mouth daily        ibuprofen 200 MG tablet    ADVIL/MOTRIN     Take 400 mg by mouth every 4 hours as needed        insulin glargine 100 UNIT/ML injection    LANTUS SOLOSTAR    1 Month    Patient to use 25 Units once daily when off the pump    Type I (juvenile type) diabetes mellitus without mention of complication, not stated as uncontrolled       * insulin lispro 100 UNIT/ML injection    HumaLOG KWIKpen    30 mL    Uses up to 75 units daily via insulin pump.    Type 1 diabetes mellitus with hyperglycemia (H)       * insulin lispro 100 UNIT/ML injection    humaLOG    90 mL    Patient uses up to 100 units per day via insulin pump.    Type 1 diabetes mellitus with hyperglycemia (H)       insulin pen needle 31G X 5 MM    B-D U/F    200 each    Use 6 time(s) a day.    Type I (juvenile type) diabetes mellitus without mention of complication, not stated as uncontrolled       Insulin Pump Accessories Misc      None Entered        ketone blood test Strp    PRECISION XTRA KETONE    20 strip    Check ketones PRN during sick days and insulin pump set malfunctions    Type I (juvenile type) diabetes mellitus without mention of complication, not stated as uncontrolled       melatonin 3 MG tablet      Take 3 mg by mouth nightly as needed.        ondansetron 4 MG tablet    ZOFRAN    6 tablet    Take 1 tablet (4 mg) by mouth every 8 hours as needed for nausea        * Notice:  This list has 2 medication(s) that are the same as other medications prescribed for you. Read the directions carefully, and ask your doctor or other care provider to review them with you.       cramping

## 2018-10-24 PROCEDURE — 83690 ASSAY OF LIPASE: CPT

## 2018-10-24 PROCEDURE — 85027 COMPLETE CBC AUTOMATED: CPT

## 2018-10-24 PROCEDURE — 82962 GLUCOSE BLOOD TEST: CPT

## 2018-10-24 PROCEDURE — 87086 URINE CULTURE/COLONY COUNT: CPT

## 2018-10-24 PROCEDURE — 84156 ASSAY OF PROTEIN URINE: CPT

## 2018-10-24 PROCEDURE — 83520 IMMUNOASSAY QUANT NOS NONAB: CPT

## 2018-10-24 PROCEDURE — 86225 DNA ANTIBODY NATIVE: CPT

## 2018-10-24 PROCEDURE — 96374 THER/PROPH/DIAG INJ IV PUSH: CPT

## 2018-10-24 PROCEDURE — 74176 CT ABD & PELVIS W/O CONTRAST: CPT

## 2018-10-24 PROCEDURE — 99285 EMERGENCY DEPT VISIT HI MDM: CPT | Mod: 25

## 2018-10-24 PROCEDURE — 81001 URINALYSIS AUTO W/SCOPE: CPT

## 2018-10-24 PROCEDURE — 80053 COMPREHEN METABOLIC PANEL: CPT

## 2018-10-24 PROCEDURE — 97162 PT EVAL MOD COMPLEX 30 MIN: CPT

## 2018-10-24 PROCEDURE — 85652 RBC SED RATE AUTOMATED: CPT

## 2018-10-24 PROCEDURE — 83605 ASSAY OF LACTIC ACID: CPT

## 2018-10-24 PROCEDURE — 96375 TX/PRO/DX INJ NEW DRUG ADDON: CPT

## 2018-10-24 PROCEDURE — 74177 CT ABD & PELVIS W/CONTRAST: CPT

## 2018-10-24 PROCEDURE — 85730 THROMBOPLASTIN TIME PARTIAL: CPT

## 2018-10-24 PROCEDURE — 87040 BLOOD CULTURE FOR BACTERIA: CPT

## 2018-10-24 PROCEDURE — 83036 HEMOGLOBIN GLYCOSYLATED A1C: CPT

## 2018-10-24 PROCEDURE — 86200 CCP ANTIBODY: CPT

## 2018-10-24 PROCEDURE — 86140 C-REACTIVE PROTEIN: CPT

## 2018-10-24 PROCEDURE — 85610 PROTHROMBIN TIME: CPT

## 2018-10-24 PROCEDURE — 36415 COLL VENOUS BLD VENIPUNCTURE: CPT

## 2018-10-24 PROCEDURE — 84702 CHORIONIC GONADOTROPIN TEST: CPT

## 2018-10-24 PROCEDURE — 94640 AIRWAY INHALATION TREATMENT: CPT

## 2018-10-24 PROCEDURE — 86431 RHEUMATOID FACTOR QUANT: CPT

## 2018-10-24 PROCEDURE — 86038 ANTINUCLEAR ANTIBODIES: CPT

## 2019-08-28 NOTE — DIETITIAN INITIAL EVALUATION ADULT. - NUTRITION DIAGNOSTIC TERMINOLOGY #1
Knowledge and Beliefs Plan: Patient to continue Soolantra once daily.\\n\\nPatient to start Elidel applying to the affected areas 1-2 times daily. Detail Level: Zone

## 2022-09-02 NOTE — PROVIDER CONTACT NOTE (CRITICAL VALUE NOTIFICATION) - NAME OF MD/NP/PA/DO NOTIFIED:
Attempted to contact Ramakrishna with recommendations, no answer at preferred number, unable to leave VM.    Message routed to home health RN for update. Last nurse visit 08/30/22, next scheduled visit 09/06/22.   Dr. Gutierrez

## 2024-03-15 RX ORDER — CLONAZEPAM 1 MG
0 TABLET ORAL
Qty: 0 | Refills: 0 | DISCHARGE

## 2024-03-15 RX ORDER — LEVOTHYROXINE SODIUM 125 MCG
1 TABLET ORAL
Qty: 0 | Refills: 0 | DISCHARGE

## 2024-03-15 RX ORDER — MONTELUKAST 4 MG/1
0 TABLET, CHEWABLE ORAL
Qty: 0 | Refills: 0 | DISCHARGE

## 2024-03-15 RX ORDER — LISINOPRIL 2.5 MG/1
1 TABLET ORAL
Qty: 0 | Refills: 0 | DISCHARGE

## 2024-03-15 RX ORDER — GABAPENTIN 400 MG/1
1 CAPSULE ORAL
Qty: 0 | Refills: 0 | DISCHARGE

## 2024-03-15 RX ORDER — FUROSEMIDE 40 MG
1 TABLET ORAL
Qty: 0 | Refills: 0 | DISCHARGE

## 2024-03-15 RX ORDER — CITALOPRAM 10 MG/1
0 TABLET, FILM COATED ORAL
Qty: 0 | Refills: 0 | DISCHARGE

## 2024-03-15 RX ORDER — MIRTAZAPINE 45 MG/1
0 TABLET, ORALLY DISINTEGRATING ORAL
Qty: 0 | Refills: 0 | DISCHARGE

## 2024-03-15 RX ORDER — OXYCODONE HYDROCHLORIDE 5 MG/1
1 TABLET ORAL
Qty: 0 | Refills: 0 | DISCHARGE

## 2024-03-15 RX ORDER — ALBUTEROL 90 UG/1
0 AEROSOL, METERED ORAL
Qty: 0 | Refills: 0 | DISCHARGE

## 2024-03-15 RX ORDER — TRAZODONE HCL 50 MG
200 TABLET ORAL
Qty: 0 | Refills: 0 | DISCHARGE

## 2024-03-15 RX ORDER — ATORVASTATIN CALCIUM 80 MG/1
0 TABLET, FILM COATED ORAL
Qty: 0 | Refills: 0 | DISCHARGE

## 2024-03-21 NOTE — CHART NOTE - NSCHARTNOTEFT_GEN_A_CORE
Assessment:   patient seen sleeping . per RN PO good. noted patient requesting inpatient psych admit. diet as ordered appropriate noted seen by CDE.  Factors impacting intake: [x ] none [ ] nausea  [ ] vomiting [ ] diarrhea [ ] constipation  [ ]chewing problems [ ] swallowing issues  [ ] other:     Diet Presciption: Diet, Consistent Carbohydrate w/Evening Snack:   DASH/TLC {Sodium & Cholesterol Restricted} (07-21-18 @ 17:55)    Intake: good PO per RN     Current Weight: Weight 7/25 wt 240# admit 220# noted room change since admit will follow weights.      Pertinent Medications: MEDICATIONS  (STANDING):  ALBUTerol/ipratropium for Nebulization 3 milliLiter(s) Nebulizer three times a day  aspirin enteric coated 81 milliGRAM(s) Oral daily  atorvastatin 20 milliGRAM(s) Oral at bedtime  buDESOnide   0.5 milliGRAM(s) Respule 0.5 milliGRAM(s) Inhalation two times a day  cefTRIAXone   IVPB 1 Gram(s) IV Intermittent every 24 hours  citalopram 20 milliGRAM(s) Oral daily  clonazePAM Tablet 0.5 milliGRAM(s) Oral three times a day  dextrose 5%. 1000 milliLiter(s) (50 mL/Hr) IV Continuous <Continuous>  dextrose 5%. 1000 milliLiter(s) (50 mL/Hr) IV Continuous <Continuous>  dextrose 50% Injectable 12.5 Gram(s) IV Push once  dextrose 50% Injectable 25 Gram(s) IV Push once  dextrose 50% Injectable 25 Gram(s) IV Push once  dextrose 50% Injectable 12.5 Gram(s) IV Push once  dextrose 50% Injectable 25 Gram(s) IV Push once  dextrose 50% Injectable 25 Gram(s) IV Push once  enoxaparin Injectable 40 milliGRAM(s) SubCutaneous daily  famotidine    Tablet 20 milliGRAM(s) Oral two times a day  furosemide    Tablet 20 milliGRAM(s) Oral two times a day  gabapentin 600 milliGRAM(s) Oral two times a day  insulin glargine Injectable (LANTUS) 30 Unit(s) SubCutaneous at bedtime  insulin glargine Injectable (LANTUS) 15 Unit(s) SubCutaneous every morning  insulin lispro (HumaLOG) corrective regimen sliding scale   SubCutaneous three times a day before meals  insulin lispro Injectable (HumaLOG) 10 Unit(s) SubCutaneous before breakfast  insulin lispro Injectable (HumaLOG) 10 Unit(s) SubCutaneous before lunch  insulin lispro Injectable (HumaLOG) 10 Unit(s) SubCutaneous before dinner  lactobacillus acidophilus 1 Tablet(s) Oral two times a day with meals  levothyroxine 50 MICROGram(s) Oral daily  lisinopril 2.5 milliGRAM(s) Oral daily  mirtazapine Soltab 30 milliGRAM(s) Oral daily  nicotine -  14 mG/24Hr(s) Patch 1 patch Transdermal daily  traZODone 100 milliGRAM(s) Oral at bedtime    MEDICATIONS  (PRN):  acetaminophen   Tablet. 650 milliGRAM(s) Oral every 6 hours PRN Mild Pain (1 - 3)  dextrose 40% Gel 15 Gram(s) Oral once PRN Blood Glucose LESS THAN 70 milliGRAM(s)/deciliter  dextrose 40% Gel 15 Gram(s) Oral once PRN Blood Glucose LESS THAN 70 milliGRAM(s)/deciliter  glucagon  Injectable 1 milliGRAM(s) IntraMuscular once PRN Glucose LESS THAN 70 milligrams/deciliter  glucagon  Injectable 1 milliGRAM(s) IntraMuscular once PRN Glucose LESS THAN 70 milligrams/deciliter  ondansetron Injectable 4 milliGRAM(s) IV Push every 6 hours PRN Nausea and/or Vomiting  oxyCODONE    IR 10 milliGRAM(s) Oral four times a day PRN Moderate Pain (4 - 6)    Pertinent Labs: 07-24 Na139 mmol/L Glu 240 mg/dL<H> K+ 4.3 mmol/L Cr  0.86 mg/dL BUN 25 mg/dL<H> 07-24 Alb 3.1 g/dL<L> 07-22 [id number] 11.0 %<H>     CAPILLARY BLOOD GLUCOSE      POCT Blood Glucose.: 353 mg/dL (25 Jul 2018 07:33)  POCT Blood Glucose.: 234 mg/dL (24 Jul 2018 21:24)  POCT Blood Glucose.: 197 mg/dL (24 Jul 2018 17:09)  POCT Blood Glucose.: 314 mg/dL (24 Jul 2018 12:46)    Skin: marily 21 no pressure injury    Estimated Needs:   [x ] no change since previous assessment  [ ] recalculated:     Previous Nutrition Diagnosis:   [ ] Inadequate Energy Intake [ ]Inadequate Oral Intake [ ] Excessive Energy Intake   [ ] Underweight [ ] Increased Nutrient Needs [ ] Overweight/Obesity   [ ] Altered GI Function [ ] Unintended Weight Loss [ ] Food & Nutrition Related Knowledge Deficit [ ] Malnutrition   (X) not ready for diet lifestyle change   Nutrition Diagnosis is [ x] ongoing with history noted    New Nutrition Diagnosis: [x ] not applicable       Interventions:   Recommend  [ ] Change Diet To:  [ ] Nutrition Supplement  [ ] Nutrition Support  [x] Other: continue diet as ordered    Monitoring and Evaluation:   [x ] PO intake [ x ] Tolerance to diet prescription [ x ] weights [ x ] labs[ x ] follow up per protocol  [ ] other:
Yes

## 2024-06-03 NOTE — PROGRESS NOTE BEHAVIORAL HEALTH - NSBHADMITIPOBSFT_PSY_A_CORE
[facility] [place], [address] [place] [address]  Dept: [phone]  Dept Fax: [phone]       Sofi Rueda :1989 MRN:[id number]    6/3/2024 Time Session Began: 10:05am  Time Session Ended: 10:48am    This visit was performed via live interactive two-way Video visit with patient's verbal consent.   Clinician Location:Home.  Patient Location: Home.  Verified patient identity:  [x] Yes    Session Type:Therapy 38-52 minutes (22865)    Others Present: none    Intervention: Cognitive Behavioral    Suicide/Homicide/Violence Ideation: No    If Yes, explain:     Current Outpatient Medications   Medication Sig    hydrOXYzine (ATARAX) 10 MG tablet TAKE ONE TABLET BY MOUTH EVERY 8 HOURS AS NEEDED FOR ANXIETY    DULoxetine (CYMBALTA) 60 MG capsule Take 1 capsule by mouth daily.    guanFACINE (INTUNIV) 1 MG TABLET SR 24 HR Take 1 tablet by mouth daily.    lamoTRIgine (LaMICtal) 25 MG tablet Take 2 tablets by mouth daily.    pregabalin (LYRICA) 25 MG capsule Take 1 capsule by mouth in the morning and 1 capsule in the evening.    cyclobenzaprine (FLEXERIL) 5 MG tablet TAKE 1 TABLET BY MOUTH NIGHTLY AS NEEDED FOR MUSCLE SPASMS.    Aspirin-Acetaminophen-Caffeine (EXCEDRIN PO) Uses daily    diphenhydrAMINE-APAP, sleep, (TYLENOL PM EXTRA STRENGTH PO) Takes nightly    tamsulosin (FLOMAX) 0.4 MG Cap Take 1 capsule by mouth daily.    fluticasone (FLONASE) 50 MCG/ACT nasal spray     etonogestrel (NEXPLANON) 68 MG implant Instructions Placed by provider - long term med x 3 years from start date..    loratadine (CLARITIN) 10 MG tablet Take 10 mg by mouth every 24 hours as needed.    naproxen sodium (ALEVE) 220 MG tablet Take 220 mg by mouth.    Probiotic Product (Florajen3) capsule Per package instructions    SUMAtriptan (IMITREX) 50 MG tablet TAKE 1 TABLET BY MOUTH WITH 1-2 ALEVE WITH THE ONSET OF MIGRAINE HEADACHE. MAY REPEAT IN 2 HRS *MAX 4 TABLETS IN 24 HRS*     No current  facility-administered medications for this visit.       Change in Medication(s) Reported: No  If Yes, explain:     Patient/Family Education Provided: Yes  Patient/Family Displays Understanding: Yes    If No, explain:     Chief complaint in patient's own words: \"I feel a lot of shame often.\"    Progress Note containing chief complaint and symptoms/problems related to the complaint:    (Data/Action/Response/Plan)    D: Sofi Rueda, a 35 year old female presents with moderate major depression, generalized anxiety, ADHD inattentive type, autism spectrum and anger issues.   A:  Writer offered reflective and active listening skills.  Writer completed session by video.  Writer discussed coping with self care and relational issues.  Writer offered a CBT approach to treatment.  R:  Client responded well to reflective and active listening skills.  Client discussed coping with self care skills, trying to improve the way she views herself, such as eating well, and avoiding shame based thinking which does not produce any positive benefits.  Client discussed being afraid to look at her grades from last semester because of fear that she did not pass a class.  Client discussed going back to her PCP for a possible referral to a nutritionist due to binging behaviors.  Client responded well to a CBT approach to treatment.  P:  Plan for client to come in biweekly for psychotherapy using a CBT approach.    Need for Community Resources Assessed: Yes    Resources Needed: Unsure    If Yes, what resources:     Primary Diagnosis: Major depressive disorder, recurrent episode, moderate  (CMD)  (primary encounter diagnosis)  DAVEY (generalized anxiety disorder)  Attention deficit hyperactivity disorder, inattentive type  Autism spectrum disorder (CMD)    Treatment Plan: Unchanged    Discharge Plan: N/A    Next Appointment: 6/18  KATHIE Hoff   calm, cooperative  reports will tell staff if she has SI while on the unit, reports will not act on SI on unit

## 2024-06-19 NOTE — PROGRESS NOTE BEHAVIORAL HEALTH - NS ED BHA MSE SPEECH RATE
Monitor site   
Normal

## 2025-04-28 NOTE — BEHAVIORAL HEALTH ASSESSMENT NOTE - NSBHCONSORIP_PSY_A_CORE
Consult... Expected Date Of Service: 04/28/2025 Bill For Surgical Tray: no Performing Laboratory: -[id number] Billing Type: Third-Party Bill Performing Laboratory: [id number]